# Patient Record
Sex: MALE | Race: WHITE | NOT HISPANIC OR LATINO | Employment: OTHER | URBAN - METROPOLITAN AREA
[De-identification: names, ages, dates, MRNs, and addresses within clinical notes are randomized per-mention and may not be internally consistent; named-entity substitution may affect disease eponyms.]

---

## 2018-12-10 ENCOUNTER — TRANSFERRED RECORDS (OUTPATIENT)
Dept: HEALTH INFORMATION MANAGEMENT | Facility: CLINIC | Age: 73
End: 2018-12-10

## 2018-12-13 ENCOUNTER — TRANSFERRED RECORDS (OUTPATIENT)
Dept: HEALTH INFORMATION MANAGEMENT | Facility: CLINIC | Age: 73
End: 2018-12-13

## 2019-02-05 ENCOUNTER — TRANSFERRED RECORDS (OUTPATIENT)
Dept: HEALTH INFORMATION MANAGEMENT | Facility: CLINIC | Age: 74
End: 2019-02-05

## 2019-02-11 ENCOUNTER — TRANSFERRED RECORDS (OUTPATIENT)
Dept: HEALTH INFORMATION MANAGEMENT | Facility: CLINIC | Age: 74
End: 2019-02-11

## 2019-02-19 ENCOUNTER — MEDICAL CORRESPONDENCE (OUTPATIENT)
Dept: HEALTH INFORMATION MANAGEMENT | Facility: CLINIC | Age: 74
End: 2019-02-19

## 2019-02-21 ENCOUNTER — TELEPHONE (OUTPATIENT)
Dept: NEPHROLOGY | Facility: CLINIC | Age: 74
End: 2019-02-21

## 2019-02-21 DIAGNOSIS — N28.1 KIDNEY CYSTS: Primary | ICD-10-CM

## 2019-02-21 NOTE — TELEPHONE ENCOUNTER
[facility] Call Center    Phone Message    May a detailed message be left on voicemail: yes    Reason for Call: Other: Pt is calling because he will be a new Pt if Dr. Maria , Pt has a CD of a MRI and a ultra sound and would like to know what would be the best way of getting this information to him. The pt. also would like to know do he need a Urinalysis before his appt as well. Please call and discuss with the pt.      Action Taken: Message routed to:  [facility] ([facility] michelle plascencia

## 2019-02-22 NOTE — TELEPHONE ENCOUNTER
[facility] Call Center    Phone Message    May a detailed message be left on voicemail: yes    Reason for Call: Other: Pt called in today stating that he still has not heard back regarding his concerns. Pt would really like to get all of his documents, labs, previous blood work, and everything to the doctor before his procedure. Pt stated that he is willing to drop them off. Pt also stated that if it is at all possible or if there are any cancellations he would love to take an earlier appointment. Please advise when available     Action Taken: Message routed to:  [facility] ([facility]): Neph

## 2019-02-22 NOTE — TELEPHONE ENCOUNTER
Spoke to patient regarding appointment, labs and records. Patient will send records and disks via mail, and may sign ERIBERTO for [facility] as well. Patient wanting to have urine labs done prior at [facility]. Will fax orders to [phone]. Cancelled lab appointment for March 28. Message sent to schedulers regarding sooner appointment if possible for Dr. Maria.  Chitra Hall, AGATA  Nephrology  [phone]

## 2019-02-25 ENCOUNTER — DOCUMENTATION ONLY (OUTPATIENT)
Dept: CARE COORDINATION | Facility: CLINIC | Age: 74
End: 2019-02-25

## 2019-02-25 ENCOUNTER — TRANSFERRED RECORDS (OUTPATIENT)
Dept: HEALTH INFORMATION MANAGEMENT | Facility: CLINIC | Age: 74
End: 2019-02-25

## 2019-02-26 ENCOUNTER — TELEPHONE (OUTPATIENT)
Dept: NEPHROLOGY | Facility: CLINIC | Age: 74
End: 2019-02-26

## 2019-02-26 NOTE — TELEPHONE ENCOUNTER
[facility] Call Center    Phone Message    May a detailed message be left on voicemail: yes    Reason for Call: Requesting Results   Name/type of test: Lab tests  Date of test: 2/25/2019, he states that [facility] faxed the results to the clinic today  Was test done at a location other than [facility] (Please fill in the location if not [facility])?: Yes: [facility]      Action Taken: Message routed to:  [facility] ([facility]):  Nephrology

## 2019-02-28 ENCOUNTER — TELEPHONE (OUTPATIENT)
Dept: NEPHROLOGY | Facility: CLINIC | Age: 74
End: 2019-02-28

## 2019-02-28 NOTE — TELEPHONE ENCOUNTER
Pt called to verify receipt of labs sent and the cd of the ultrasound that he mailed 2 days ago.  Please follow up with pt, via his cell phone number.  Thank you!

## 2019-03-01 NOTE — TELEPHONE ENCOUNTER
Call to inform patient that lab results and CD have been received. CD sent to be uploaded.  Chitra Hall LPN  Nephrology  [phone]

## 2019-03-20 ENCOUNTER — MYC MEDICAL ADVICE (OUTPATIENT)
Dept: NEPHROLOGY | Facility: CLINIC | Age: 74
End: 2019-03-20

## 2019-03-27 ENCOUNTER — DOCUMENTATION ONLY (OUTPATIENT)
Dept: NEPHROLOGY | Facility: CLINIC | Age: 74
End: 2019-03-27

## 2019-03-27 DIAGNOSIS — N28.1 KIDNEY CYSTS: Primary | ICD-10-CM

## 2019-03-27 NOTE — PROGRESS NOTES
Radiology will be able to Push US images from Feb 11 through to [facility] tomorrow once consent is signed. Patient has been informed and will also bring his CD of US images and personal computer tomorrow. Will update Dr. Maria.  Chitra Hall, AGATA  Nephrology  [phone]

## 2019-03-28 ENCOUNTER — OFFICE VISIT (OUTPATIENT)
Dept: NEPHROLOGY | Facility: CLINIC | Age: 74
End: 2019-03-28
Attending: INTERNAL MEDICINE
Payer: MEDICARE

## 2019-03-28 VITALS
WEIGHT: 160.4 LBS | HEART RATE: 60 BPM | HEIGHT: 68 IN | TEMPERATURE: 97 F | OXYGEN SATURATION: 97 % | BODY MASS INDEX: 24.31 KG/M2 | SYSTOLIC BLOOD PRESSURE: 160 MMHG | DIASTOLIC BLOOD PRESSURE: 81 MMHG

## 2019-03-28 DIAGNOSIS — I12.9 RENAL HYPERTENSION: ICD-10-CM

## 2019-03-28 DIAGNOSIS — I67.1 CEREBRAL ANEURYSM: ICD-10-CM

## 2019-03-28 DIAGNOSIS — Q61.2 ADPKD (AUTOSOMAL DOMINANT POLYCYSTIC KIDNEY DISEASE): Primary | ICD-10-CM

## 2019-03-28 DIAGNOSIS — I67.1 CEREBRAL ANEURYSM, NONRUPTURED: ICD-10-CM

## 2019-03-28 DIAGNOSIS — N28.1 KIDNEY CYSTS: ICD-10-CM

## 2019-03-28 DIAGNOSIS — Z86.79 HISTORY OF INTRACRANIAL HEMORRHAGE: ICD-10-CM

## 2019-03-28 LAB
ALBUMIN SERPL-MCNC: 3.9 G/DL (ref 3.4–5)
ALBUMIN UR-MCNC: NEGATIVE MG/DL
ALP SERPL-CCNC: 65 U/L (ref 40–150)
ALT SERPL W P-5'-P-CCNC: 24 U/L (ref 0–70)
ANION GAP SERPL CALCULATED.3IONS-SCNC: 5 MMOL/L (ref 3–14)
APPEARANCE UR: ABNORMAL
AST SERPL W P-5'-P-CCNC: 18 U/L (ref 0–45)
BILIRUB SERPL-MCNC: 0.3 MG/DL (ref 0.2–1.3)
BILIRUB UR QL STRIP: NEGATIVE
BUN SERPL-MCNC: 31 MG/DL (ref 7–30)
CALCIUM SERPL-MCNC: 8.7 MG/DL (ref 8.5–10.1)
CHLORIDE SERPL-SCNC: 109 MMOL/L (ref 94–109)
CO2 SERPL-SCNC: 27 MMOL/L (ref 20–32)
COLOR UR AUTO: YELLOW
CREAT SERPL-MCNC: 1.17 MG/DL (ref 0.66–1.25)
CREAT UR-MCNC: 196 MG/DL
ERYTHROCYTE [DISTWIDTH] IN BLOOD BY AUTOMATED COUNT: 12.5 % (ref 10–15)
GFR SERPL CREATININE-BSD FRML MDRD: 62 ML/MIN/{1.73_M2}
GLUCOSE SERPL-MCNC: 106 MG/DL (ref 70–99)
GLUCOSE UR STRIP-MCNC: NEGATIVE MG/DL
HCT VFR BLD AUTO: 41.5 % (ref 40–53)
HGB BLD-MCNC: 13.7 G/DL (ref 13.3–17.7)
HGB UR QL STRIP: NEGATIVE
KETONES UR STRIP-MCNC: 5 MG/DL
LEUKOCYTE ESTERASE UR QL STRIP: NEGATIVE
MCH RBC QN AUTO: 31.5 PG (ref 26.5–33)
MCHC RBC AUTO-ENTMCNC: 33 G/DL (ref 31.5–36.5)
MCV RBC AUTO: 95 FL (ref 78–100)
MUCOUS THREADS #/AREA URNS LPF: PRESENT /LPF
NITRATE UR QL: NEGATIVE
PH UR STRIP: 5 PH (ref 5–7)
PLATELET # BLD AUTO: 218 10E9/L (ref 150–450)
POTASSIUM SERPL-SCNC: 4 MMOL/L (ref 3.4–5.3)
PROT SERPL-MCNC: 6.7 G/DL (ref 6.8–8.8)
PROT UR-MCNC: 0.2 G/L
PROT/CREAT 24H UR: 0.1 G/G CR (ref 0–0.2)
RBC # BLD AUTO: 4.35 10E12/L (ref 4.4–5.9)
RBC #/AREA URNS AUTO: 3 /HPF (ref 0–2)
SODIUM SERPL-SCNC: 142 MMOL/L (ref 133–144)
SOURCE: ABNORMAL
SP GR UR STRIP: 1.02 (ref 1–1.03)
UROBILINOGEN UR STRIP-MCNC: 2 MG/DL (ref 0–2)
WBC # BLD AUTO: 6.4 10E9/L (ref 4–11)
WBC #/AREA URNS AUTO: 1 /HPF (ref 0–5)

## 2019-03-28 PROCEDURE — 84156 ASSAY OF PROTEIN URINE: CPT | Performed by: INTERNAL MEDICINE

## 2019-03-28 PROCEDURE — 81001 URINALYSIS AUTO W/SCOPE: CPT | Performed by: INTERNAL MEDICINE

## 2019-03-28 PROCEDURE — 36415 COLL VENOUS BLD VENIPUNCTURE: CPT | Performed by: INTERNAL MEDICINE

## 2019-03-28 PROCEDURE — 85027 COMPLETE CBC AUTOMATED: CPT | Performed by: INTERNAL MEDICINE

## 2019-03-28 PROCEDURE — 80053 COMPREHEN METABOLIC PANEL: CPT | Performed by: INTERNAL MEDICINE

## 2019-03-28 PROCEDURE — G0463 HOSPITAL OUTPT CLINIC VISIT: HCPCS | Mod: ZF

## 2019-03-28 RX ORDER — AMLODIPINE BESYLATE 10 MG/1
10 TABLET ORAL DAILY
Qty: 90 TABLET | Refills: 3 | Status: SHIPPED | OUTPATIENT
Start: 2019-03-28 | End: 2019-06-21

## 2019-03-28 RX ORDER — AMLODIPINE BESYLATE 10 MG/1
10 TABLET ORAL DAILY
COMMUNITY
End: 2019-06-13

## 2019-03-28 RX ORDER — LISINOPRIL 10 MG/1
10 TABLET ORAL DAILY
Qty: 90 TABLET | Refills: 3 | Status: SHIPPED | OUTPATIENT
Start: 2019-03-28 | End: 2019-06-21

## 2019-03-28 SDOH — HEALTH STABILITY: MENTAL HEALTH: HOW OFTEN DO YOU HAVE A DRINK CONTAINING ALCOHOL?: NEVER

## 2019-03-28 ASSESSMENT — MIFFLIN-ST. JEOR: SCORE: 1457.07

## 2019-03-28 ASSESSMENT — PAIN SCALES - GENERAL: PAINLEVEL: NO PAIN (0)

## 2019-03-28 NOTE — LETTER
3/28/2019       RE: Tariq Yeager  [address]  [place] [address]     Dear Colleague,    Thank you for referring your patient, Tariq Yeager, to the [facility] NEPHROLOGY at [facility]. Please see a copy of my visit note below.    Nephrology Clinic    Tariq Yeager MRN:[id number] YOB: 1945  Date of Service: 03/30/2019  Primary care provider: No Ref-Primary, Physician  Requesting physician: Kristina Alvarado      REASON FOR CONSULT: Polycystic kidney disease    HISTORY OF PRESENT ILLNESS:   Tariq Yeager is a 73 year old male who presents for evaluation of multiple kidney cysts.    Mr Yeager has recently been informed about the presence of bilateral renal cysts which were incidentally identified by MRI of the spine he received in early February 2018 for back pain.  The presence of bilateral renal cysts was confirmed by ultrasound also obtained recently. (2/11/19 [facility])(see below).  He denies any history of gross hematuria, flank pain, urolithiasis, or UTIs.  He does however have a history of acute sudden incerebral hemorrhage in 2002 for which he had a prolonged ICU hospitalization at [facility] .  Note is made that he also underwent placement of a permanent IVC filter, although Mr Yeager does not recall having had a DVT.  Prior to that event, it does not appear that he was in good health.  He had an inguinal herniorrhaphy on the R and reports a recurrence of the hernia + new Inguinal hernia on the L.  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     Hypertension 2002     Inguinal hernia 2016     Presence of IVC filter 2002     PAST SURGICAL HISTORY:  Past Surgical History:   Procedure Laterality Date     cerebral  "aneurysm coil  2002     CRANIOTOMY, REPAIR ANEURYSM, COMBINED  2002     HERNIORRHAPHY INGUINAL Right 2016     MEDICATIONS:  Prescription Medications as of 3/30/2019       Rx Number Disp Refills Start End Last Dispensed Date Next Fill Date Owning Pharmacy    amLODIPine (NORVASC) 10 MG tablet            Sig: Take 10 mg by mouth daily    Class: Historical    Route: Oral    amLODIPine (NORVASC) 10 MG tablet  90 tablet 3 3/28/2019    [facility] [address] [place] [address], [place] - [address] AT [facility]    Sig: Take 1 tablet (10 mg) by mouth daily    Class: E-Prescribe    Route: Oral    lisinopril (PRINIVIL/ZESTRIL) 10 MG tablet  90 tablet 3 3/28/2019    [facility] [address] - [place], [place] - [address] AT [facility]    Sig: Take 1 tablet (10 mg) by mouth daily    Class: E-Prescribe    Route: Oral         ALLERGIES:    Allergies   Allergen Reactions     Dilantin [Phenytoin] Rash     REVIEW OF SYSTEMS:  A comprehensive review of systems was performed and found to be negative except as described here or above. + history of recurrent epistaxis (last episode 2 months ago)      SOCIAL HISTORY:   Social History     Socioeconomic History     Marital status:      Spouse name: Not on file     Number of children: Not on file     Years of education: Not on file     Highest education level: Not on file   Occupational History     Not on file   Social Needs     Financial resource strain: Not on file     Food insecurity:     Worry: Not on file     Inability: Not on file     Transportation needs:     Medical: Not on file     Non-medical: Not on file   Tobacco Use     Smoking status: Never Smoker     Smokeless tobacco: Never Used   Substance and Sexual Activity     Alcohol use: No     Frequency: Never.  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  Remote history of use > 40 yrs ago     Sexual activity: Not on file   Lifestyle     Physical activity:     Days " "per week: Swims 3x/week     Minutes per session: 25 min     Stress: Not on file   Relationships   Other Topics Concern     Not on file   Social History Narrative     Not on file     FAMILY MEDICAL HISTORY:   No family history on file.  PHYSICAL EXAM:   /81   Pulse 60   Temp 97  F (36.1  C) (Oral)   Ht 1.727 m (5' 8\")   Wt 72.8 kg (160 lb 6.4 oz)   SpO2 97%   BMI 24.39 kg/m     GENERAL APPEARANCE: alert and no distress  EYES: nonicteric  HENT: mouth without ulcers or lesions  NECK: supple, no adenopathy  RESP: lungs clear to auscultation   CV: regular rhythm, normal rate, no rub  ABDOMEN: soft, nontender, normal bowel sounds, no HSM   Extremities: no edema  MS: no evidence of inflammation in joints, no muscle tenderness  SKIN: no rash  NEURO: mentation intact and speech normal  PSYCH: affect normal/bright   LABS:   Recent Results (from the past 1008 hour(s))   Comprehensive metabolic panel    Collection Time: 03/28/19  2:56 PM   Result Value Ref Range    Sodium 142 133 - 144 mmol/L    Potassium 4.0 3.4 - 5.3 mmol/L    Chloride 109 94 - 109 mmol/L    Carbon Dioxide 27 20 - 32 mmol/L    Anion Gap 5 3 - 14 mmol/L    Glucose 106 (H) 70 - 99 mg/dL    Urea Nitrogen 31 (H) 7 - 30 mg/dL    Creatinine 1.17 0.66 - 1.25 mg/dL    GFR Estimate 62 >60 mL/min/[1.73_m2]    GFR Estimate If Black 72 >60 mL/min/[1.73_m2]    Calcium 8.7 8.5 - 10.1 mg/dL    Bilirubin Total 0.3 0.2 - 1.3 mg/dL    Albumin 3.9 3.4 - 5.0 g/dL    Protein Total 6.7 (L) 6.8 - 8.8 g/dL    Alkaline Phosphatase 65 40 - 150 U/L    ALT 24 0 - 70 U/L    AST 18 0 - 45 U/L   CBC with platelets    Collection Time: 03/28/19  2:56 PM   Result Value Ref Range    WBC 6.4 4.0 - 11.0 10e9/L    RBC Count 4.35 (L) 4.4 - 5.9 10e12/L    Hemoglobin 13.7 13.3 - 17.7 g/dL    Hematocrit 41.5 40.0 - 53.0 %    MCV 95 78 - 100 fl    MCH 31.5 26.5 - 33.0 pg    MCHC 33.0 31.5 - 36.5 g/dL    RDW 12.5 10.0 - 15.0 %    Platelet Count 218 150 - 450 10e9/L   Routine UA with micro " reflex to culture    Collection Time: 03/28/19  3:01 PM   Result Value Ref Range    Color Urine Yellow     Appearance Urine Slightly Cloudy     Glucose Urine Negative NEG^Negative mg/dL    Bilirubin Urine Negative NEG^Negative    Ketones Urine 5 (A) NEG^Negative mg/dL    Specific Gravity Urine 1.019 1.003 - 1.035    Blood Urine Negative NEG^Negative    pH Urine 5.0 5.0 - 7.0 pH    Protein Albumin Urine Negative NEG^Negative mg/dL    Urobilinogen mg/dL 2.0 0.0 - 2.0 mg/dL    Nitrite Urine Negative NEG^Negative    Leukocyte Esterase Urine Negative NEG^Negative    Source Midstream Urine     WBC Urine 1 0 - 5 /HPF    RBC Urine 3 (H) 0 - 2 /HPF    Mucous Urine Present (A) NEG^Negative /LPF   Protein  random urine with Creat Ratio    Collection Time: 03/28/19  3:01 PM   Result Value Ref Range    Protein Random Urine 0.20 g/L    Protein Total Urine g/gr Creatinine 0.10 0 - 0.2 g/g Cr   Creatinine urine calculation only    Collection Time: 03/28/19  3:01 PM   Result Value Ref Range    Creatinine Urine 196 mg/dL     CMP  Recent Labs   Lab Test 03/28/19  1456      POTASSIUM 4.0   CHLORIDE 109   CO2 27   ANIONGAP 5   *   BUN 31*   CR 1.17   GFRESTIMATED 62   GFRESTBLACK 72   JOHN 8.7   PROTTOTAL 6.7*   ALBUMIN 3.9   BILITOTAL 0.3   ALKPHOS 65   AST 18   ALT 24     CBC  Recent Labs   Lab Test 03/28/19  1456   HGB 13.7   WBC 6.4   RBC 4.35*   HCT 41.5   MCV 95   MCH 31.5   MCHC 33.0   RDW 12.5        INRNo lab results found.  ABGNo lab results found.   URINE STUDIES  Recent Labs   Lab Test 03/28/19  1501   COLOR Yellow   APPEARANCE Slightly Cloudy   URINEGLC Negative   URINEBILI Negative   URINEKETONE 5*   SG 1.019   UBLD Negative   URINEPH 5.0   PROTEIN Negative   NITRITE Negative   LEUKEST Negative   RBCU 3*   WBCU 1     Recent Labs   Lab Test 03/28/19  1501   UTPG 0.10               ASSESSMENT AND PLAN:   Mr Yeager presents with concerns about recently finding that he has multiple cysts on both kidneys  "found incidentally when he had an MRI of the spine.  This was confirmed by US.    He has a suggestive family history with one of his sisters who has \"cysts\".  His other sister and brother have not been tested.    In addition, his personal history is certainly consistent with and suggestive with ADPKD with history of ruptured cerebral aneurysm and bilateral hernias.  At this point; he has well preserved renal function at the age of 73.  I do not have accurate measures of the kidney sizes (see report).  However, even if I estimated the R kidney to be 16 cm in length, we would still be classified as 1B by the [place] ADPKD calculator, and his 10 year eGFR course would still place him at ~ 30 ml/min/1.73 m2 at age 83.   I will obtain an MRI without contrast of the kidneys to better document his renal sizes and better evaluate for complex cysts.  More importantly, I am concerned about his history of ruptured cysts.  He has not had a follow up MRI in 16 years.  I will therefore obtain an MRA with contrast (given his preserved renal function).  I am also concerned about his uncontrolled HTN.  He used to be on lisinopril but then switched to amlodipine alone.  I have refilled the amlodipine and added lisinopril 10 mg daily.  I plan to obtain follow up labs in 10 days, and follow up visit in 3 months.    Feroz Maria MD  Division of Renal Disease and Hypertension  March 28, 2019  4:39 PM      Again, thank you for allowing me to participate in the care of your patient.      Sincerely,    Feroz Maria MD      "

## 2019-03-28 NOTE — PROGRESS NOTES
Nephrology Clinic    Tariq Yeager MRN:[id number] YOB: 1945  Date of Service: 03/30/2019  Primary care provider: Jo Ref-Primary, Physician  Requesting physician: Kristina Alvarado      REASON FOR CONSULT: Polycystic kidney disease    HISTORY OF PRESENT ILLNESS:   Tariq Yeager is a 73 year old male who presents for evaluation of multiple kidney cysts.    Mr Yeager has recently been informed about the presence of bilateral renal cysts which were incidentally identified by MRI of the spine he received in early February 2018 for back pain.  The presence of bilateral renal cysts was confirmed by ultrasound also obtained recently. (2/11/19 [facility]iology)(see below).  He denies any history of gross hematuria, flank pain, urolithiasis, or UTIs.  He does however have a history of acute sudden incerebral hemorrhage in 2002 for which he had a prolonged ICU hospitalization at [facility] .  Note is made that he also underwent placement of a permanent IVC filter, although Mr Yeager does not recall having had a DVT.  Prior to that event, it does not appear that he was in good health.  He had an inguinal herniorrhaphy on the R and reports a recurrence of the hernia + new Inguinal hernia on the L.  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     Hypertension 2002     Inguinal hernia 2016     Presence of IVC filter 2002     PAST SURGICAL HISTORY:  Past Surgical History:   Procedure Laterality Date     cerebral aneurysm coil  2002     CRANIOTOMY, REPAIR ANEURYSM, COMBINED  2002     HERNIORRHAPHY INGUINAL Right 2016     MEDICATIONS:  Prescription Medications as of 3/30/2019       Rx Number Disp Refills Start End Last Dispensed Date Next Fill Date Owning Pharmacy    amLODIPine (NORVASC)  "10 MG tablet            Sig: Take 10 mg by mouth daily    Class: Historical    Route: Oral    amLODIPine (NORVASC) 10 MG tablet  90 tablet 3 3/28/2019    [facility] [address], [place] - [address] AT [facility]    Sig: Take 1 tablet (10 mg) by mouth daily    Class: E-Prescribe    Route: Oral    lisinopril (PRINIVIL/ZESTRIL) 10 MG tablet  90 tablet 3 3/28/2019    [facility] [address], [place] - [address] AT [facility]    Sig: Take 1 tablet (10 mg) by mouth daily    Class: E-Prescribe    Route: Oral         ALLERGIES:    Allergies   Allergen Reactions     Dilantin [Phenytoin] Rash     REVIEW OF SYSTEMS:  A comprehensive review of systems was performed and found to be negative except as described here or above. + history of recurrent epistaxis (last episode 2 months ago)      SOCIAL HISTORY:   Social History     Socioeconomic History     Marital status:      Spouse name: Not on file     Number of children: Not on file     Years of education: Not on file     Highest education level: Not on file   Occupational History     Not on file   Social Needs     Financial resource strain: Not on file     Food insecurity:     Worry: Not on file     Inability: Not on file     Transportation needs:     Medical: Not on file     Non-medical: Not on file   Tobacco Use     Smoking status: Never Smoker     Smokeless tobacco: Never Used   Substance and Sexual Activity     Alcohol use: No     Frequency: Never.  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  Remote history of use > 40 yrs ago     Sexual activity: Not on file   Lifestyle     Physical activity:     Days per week: Swims 3x/week     Minutes per session: 25 min     Stress: Not on file   Relationships   Other Topics Concern     Not on file   Social History Narrative     Not on file     FAMILY MEDICAL HISTORY:   No family history on file.  PHYSICAL EXAM:   /81   Pulse 60   " "Temp 97  F (36.1  C) (Oral)   Ht 1.727 m (5' 8\")   Wt 72.8 kg (160 lb 6.4 oz)   SpO2 97%   BMI 24.39 kg/m    GENERAL APPEARANCE: alert and no distress  EYES: nonicteric  HENT: mouth without ulcers or lesions  NECK: supple, no adenopathy  RESP: lungs clear to auscultation   CV: regular rhythm, normal rate, no rub  ABDOMEN: soft, nontender, normal bowel sounds, no HSM   Extremities: no edema  MS: no evidence of inflammation in joints, no muscle tenderness  SKIN: no rash  NEURO: mentation intact and speech normal  PSYCH: affect normal/bright   LABS:   Recent Results (from the past 1008 hour(s))   Comprehensive metabolic panel    Collection Time: 03/28/19  2:56 PM   Result Value Ref Range    Sodium 142 133 - 144 mmol/L    Potassium 4.0 3.4 - 5.3 mmol/L    Chloride 109 94 - 109 mmol/L    Carbon Dioxide 27 20 - 32 mmol/L    Anion Gap 5 3 - 14 mmol/L    Glucose 106 (H) 70 - 99 mg/dL    Urea Nitrogen 31 (H) 7 - 30 mg/dL    Creatinine 1.17 0.66 - 1.25 mg/dL    GFR Estimate 62 >60 mL/min/[1.73_m2]    GFR Estimate If Black 72 >60 mL/min/[1.73_m2]    Calcium 8.7 8.5 - 10.1 mg/dL    Bilirubin Total 0.3 0.2 - 1.3 mg/dL    Albumin 3.9 3.4 - 5.0 g/dL    Protein Total 6.7 (L) 6.8 - 8.8 g/dL    Alkaline Phosphatase 65 40 - 150 U/L    ALT 24 0 - 70 U/L    AST 18 0 - 45 U/L   CBC with platelets    Collection Time: 03/28/19  2:56 PM   Result Value Ref Range    WBC 6.4 4.0 - 11.0 10e9/L    RBC Count 4.35 (L) 4.4 - 5.9 10e12/L    Hemoglobin 13.7 13.3 - 17.7 g/dL    Hematocrit 41.5 40.0 - 53.0 %    MCV 95 78 - 100 fl    MCH 31.5 26.5 - 33.0 pg    MCHC 33.0 31.5 - 36.5 g/dL    RDW 12.5 10.0 - 15.0 %    Platelet Count 218 150 - 450 10e9/L   Routine UA with micro reflex to culture    Collection Time: 03/28/19  3:01 PM   Result Value Ref Range    Color Urine Yellow     Appearance Urine Slightly Cloudy     Glucose Urine Negative NEG^Negative mg/dL    Bilirubin Urine Negative NEG^Negative    Ketones Urine 5 (A) NEG^Negative mg/dL    Specific " "[place] Urine 1.019 1.003 - 1.035    Blood Urine Negative NEG^Negative    pH Urine 5.0 5.0 - 7.0 pH    Protein Albumin Urine Negative NEG^Negative mg/dL    Urobilinogen mg/dL 2.0 0.0 - 2.0 mg/dL    Nitrite Urine Negative NEG^Negative    Leukocyte Esterase Urine Negative NEG^Negative    Source Midstream Urine     WBC Urine 1 0 - 5 /HPF    RBC Urine 3 (H) 0 - 2 /HPF    Mucous Urine Present (A) NEG^Negative /LPF   Protein  random urine with Creat Ratio    Collection Time: 03/28/19  3:01 PM   Result Value Ref Range    Protein Random Urine 0.20 g/L    Protein Total Urine g/gr Creatinine 0.10 0 - 0.2 g/g Cr   Creatinine urine calculation only    Collection Time: 03/28/19  3:01 PM   Result Value Ref Range    Creatinine Urine 196 mg/dL     CMP  Recent Labs   Lab Test 03/28/19  1456      POTASSIUM 4.0   CHLORIDE 109   CO2 27   ANIONGAP 5   *   BUN 31*   CR 1.17   GFRESTIMATED 62   GFRESTBLACK 72   JOHN 8.7   PROTTOTAL 6.7*   ALBUMIN 3.9   BILITOTAL 0.3   ALKPHOS 65   AST 18   ALT 24     CBC  Recent Labs   Lab Test 03/28/19  1456   HGB 13.7   WBC 6.4   RBC 4.35*   HCT 41.5   MCV 95   MCH 31.5   MCHC 33.0   RDW 12.5        INRNo lab results found.  ABGNo lab results found.   URINE STUDIES  Recent Labs   Lab Test 03/28/19  1501   COLOR Yellow   APPEARANCE Slightly Cloudy   URINEGLC Negative   URINEBILI Negative   URINEKETONE 5*   SG 1.019   UBLD Negative   URINEPH 5.0   PROTEIN Negative   NITRITE Negative   LEUKEST Negative   RBCU 3*   WBCU 1     Recent Labs   Lab Test 03/28/19  1501   UTPG 0.10               ASSESSMENT AND PLAN:   Mr Yeager presents with concerns about recently finding that he has multiple cysts on both kidneys found incidentally when he had an MRI of the spine.  This was confirmed by US.    He has a suggestive family history with one of his sisters who has \"cysts\".  His other sister and brother have not been tested.    In addition, his personal history is certainly consistent with and " suggestive with ADPKD with history of ruptured cerebral aneurysm and bilateral hernias.  At this point; he has well preserved renal function at the age of 73.  I do not have accurate measures of the kidney sizes (see report).  However, even if I estimated the R kidney to be 16 cm in length, we would still be classified as 1B by the [place] ADPKD calculator, and his 10 year eGFR course would still place him at ~ 30 ml/min/1.73 m2 at age 83.   I will obtain an MRI without contrast of the kidneys to better document his renal sizes and better evaluate for complex cysts.  More importantly, I am concerned about his history of ruptured cysts.  He has not had a follow up MRI in 16 years.  I will therefore obtain an MRA with contrast (given his preserved renal function).  I am also concerned about his uncontrolled HTN.  He used to be on lisinopril but then switched to amlodipine alone.  I have refilled the amlodipine and added lisinopril 10 mg daily.  I plan to obtain follow up labs in 10 days, and follow up visit in 3 months.    Feroz Maria MD  Division of Renal Disease and Hypertension  March 28, 2019  4:39 PM

## 2019-03-28 NOTE — LETTER
3/28/2019      RE: Tariq Yeager  [address]  [facility] [facility] [address]       Nephrology Clinic    Tariq Yeager MRN:[id number] YOB: 1945  Date of Service: 03/30/2019  Primary care provider: Jo Ref-Primary, Physician  Requesting physician: Kristina Alvarado      REASON FOR CONSULT: Polycystic kidney disease    HISTORY OF PRESENT ILLNESS:   Tariq Yeager is a 73 year old male who presents for evaluation of multiple kidney cysts.    Mr Yeager has recently been informed about the presence of bilateral renal cysts which were incidentally identified by MRI of the spine he received in early February 2018 for back pain.  The presence of bilateral renal cysts was confirmed by ultrasound also obtained recently. (2/11/19 [facility] Radiology)(see below).  He denies any history of gross hematuria, flank pain, urolithiasis, or UTIs.  He does however have a history of acute sudden incerebral hemorrhage in 2002 for which he had a prolonged ICU hospitalization at [facility] .  Note is made that he also underwent placement of a permanent IVC filter, although Mr Yeager does not recall having had a DVT.  Prior to that event, it does not appear that he was in good health.  He had an inguinal herniorrhaphy on the R and reports a recurrence of the hernia + new Inguinal hernia on the L.  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     Hypertension 2002     Inguinal hernia 2016     Presence of IVC filter 2002     PAST SURGICAL HISTORY:  Past Surgical History:   Procedure Laterality Date     cerebral aneurysm coil  2002     CRANIOTOMY, REPAIR ANEURYSM, COMBINED  2002     HERNIORRHAPHY INGUINAL Right 2016     MEDICATIONS:  Prescription Medications as of 3/30/2019       Rx Number Disp Refills Start  "End Last Dispensed Date Next Fill Date Owning Pharmacy    amLODIPine (NORVASC) 10 MG tablet            Sig: Take 10 mg by mouth daily    Class: Historical    Route: Oral    amLODIPine (NORVASC) 10 MG tablet  90 tablet 3 3/28/2019    [id number] - [place], [place] - [address] AT [facility] & [facility]    Sig: Take 1 tablet (10 mg) by mouth daily    Class: E-Prescribe    Route: Oral    lisinopril (PRINIVIL/ZESTRIL) 10 MG tablet  90 tablet 3 3/28/2019    [id number] - [place], [place] - [address] AT [facility] [address] & [facility]    Sig: Take 1 tablet (10 mg) by mouth daily    Class: E-Prescribe    Route: Oral         ALLERGIES:    Allergies   Allergen Reactions     Dilantin [Phenytoin] Rash     REVIEW OF SYSTEMS:  A comprehensive review of systems was performed and found to be negative except as described here or above. + history of recurrent epistaxis (last episode 2 months ago)      SOCIAL HISTORY:   Social History     Socioeconomic History     Marital status:      Spouse name: Not on file     Number of children: Not on file     Years of education: Not on file     Highest education level: Not on file   Occupational History     Not on file   Social Needs     Financial resource strain: Not on file     Food insecurity:     Worry: Not on file     Inability: Not on file     Transportation needs:     Medical: Not on file     Non-medical: Not on file   Tobacco Use     Smoking status: Never Smoker     Smokeless tobacco: Never Used   Substance and Sexual Activity     Alcohol use: No     Frequency: Never.  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  Remote history of use > 40 yrs ago     Sexual activity: Not on file   Lifestyle     Physical activity:     Days per week: Swims 3x/week     Minutes per session: 25 min     Stress: Not on file   Relationships   Other Topics Concern     Not on file   Social History Narrative     Not on file     FAMILY MEDICAL " "HISTORY:   No family history on file.  PHYSICAL EXAM:   /81   Pulse 60   Temp 97  F (36.1  C) (Oral)   Ht 1.727 m (5' 8\")   Wt 72.8 kg (160 lb 6.4 oz)   SpO2 97%   BMI 24.39 kg/m     GENERAL APPEARANCE: alert and no distress  EYES: nonicteric  HENT: mouth without ulcers or lesions  NECK: supple, no adenopathy  RESP: lungs clear to auscultation   CV: regular rhythm, normal rate, no rub  ABDOMEN: soft, nontender, normal bowel sounds, no HSM   Extremities: no edema  MS: no evidence of inflammation in joints, no muscle tenderness  SKIN: no rash  NEURO: mentation intact and speech normal  PSYCH: affect normal/bright   LABS:   Recent Results (from the past 1008 hour(s))   Comprehensive metabolic panel    Collection Time: 03/28/19  2:56 PM   Result Value Ref Range    Sodium 142 133 - 144 mmol/L    Potassium 4.0 3.4 - 5.3 mmol/L    Chloride 109 94 - 109 mmol/L    Carbon Dioxide 27 20 - 32 mmol/L    Anion Gap 5 3 - 14 mmol/L    Glucose 106 (H) 70 - 99 mg/dL    Urea Nitrogen 31 (H) 7 - 30 mg/dL    Creatinine 1.17 0.66 - 1.25 mg/dL    GFR Estimate 62 >60 mL/min/[1.73_m2]    GFR Estimate If Black 72 >60 mL/min/[1.73_m2]    Calcium 8.7 8.5 - 10.1 mg/dL    Bilirubin Total 0.3 0.2 - 1.3 mg/dL    Albumin 3.9 3.4 - 5.0 g/dL    Protein Total 6.7 (L) 6.8 - 8.8 g/dL    Alkaline Phosphatase 65 40 - 150 U/L    ALT 24 0 - 70 U/L    AST 18 0 - 45 U/L   CBC with platelets    Collection Time: 03/28/19  2:56 PM   Result Value Ref Range    WBC 6.4 4.0 - 11.0 10e9/L    RBC Count 4.35 (L) 4.4 - 5.9 10e12/L    Hemoglobin 13.7 13.3 - 17.7 g/dL    Hematocrit 41.5 40.0 - 53.0 %    MCV 95 78 - 100 fl    MCH 31.5 26.5 - 33.0 pg    MCHC 33.0 31.5 - 36.5 g/dL    RDW 12.5 10.0 - 15.0 %    Platelet Count 218 150 - 450 10e9/L   Routine UA with micro reflex to culture    Collection Time: 03/28/19  3:01 PM   Result Value Ref Range    Color Urine Yellow     Appearance Urine Slightly Cloudy     Glucose Urine Negative NEG^Negative mg/dL    Bilirubin " "Urine Negative NEG^Negative    Ketones Urine 5 (A) NEG^Negative mg/dL    Specific Gravity Urine 1.019 1.003 - 1.035    Blood Urine Negative NEG^Negative    pH Urine 5.0 5.0 - 7.0 pH    Protein Albumin Urine Negative NEG^Negative mg/dL    Urobilinogen mg/dL 2.0 0.0 - 2.0 mg/dL    Nitrite Urine Negative NEG^Negative    Leukocyte Esterase Urine Negative NEG^Negative    Source Midstream Urine     WBC Urine 1 0 - 5 /HPF    RBC Urine 3 (H) 0 - 2 /HPF    Mucous Urine Present (A) NEG^Negative /LPF   Protein  random urine with Creat Ratio    Collection Time: 03/28/19  3:01 PM   Result Value Ref Range    Protein Random Urine 0.20 g/L    Protein Total Urine g/gr Creatinine 0.10 0 - 0.2 g/g Cr   Creatinine urine calculation only    Collection Time: 03/28/19  3:01 PM   Result Value Ref Range    Creatinine Urine 196 mg/dL     CMP  Recent Labs   Lab Test 03/28/19  1456      POTASSIUM 4.0   CHLORIDE 109   CO2 27   ANIONGAP 5   *   BUN 31*   CR 1.17   GFRESTIMATED 62   GFRESTBLACK 72   JOHN 8.7   PROTTOTAL 6.7*   ALBUMIN 3.9   BILITOTAL 0.3   ALKPHOS 65   AST 18   ALT 24     CBC  Recent Labs   Lab Test 03/28/19  1456   HGB 13.7   WBC 6.4   RBC 4.35*   HCT 41.5   MCV 95   MCH 31.5   MCHC 33.0   RDW 12.5        INRNo lab results found.  ABGNo lab results found.   URINE STUDIES  Recent Labs   Lab Test 03/28/19  1501   COLOR Yellow   APPEARANCE Slightly Cloudy   URINEGLC Negative   URINEBILI Negative   URINEKETONE 5*   SG 1.019   UBLD Negative   URINEPH 5.0   PROTEIN Negative   NITRITE Negative   LEUKEST Negative   RBCU 3*   WBCU 1     Recent Labs   Lab Test 03/28/19  1501   UTPG 0.10               ASSESSMENT AND PLAN:   Mr Yeager presents with concerns about recently finding that he has multiple cysts on both kidneys found incidentally when he had an MRI of the spine.  This was confirmed by US.    He has a suggestive family history with one of his sisters who has \"cysts\".  His other sister and brother have not been " tested.    In addition, his personal history is certainly consistent with and suggestive with ADPKD with history of ruptured cerebral aneurysm and bilateral hernias.  At this point; he has well preserved renal function at the age of 73.  I do not have accurate measures of the kidney sizes (see report).  However, even if I estimated the R kidney to be 16 cm in length, we would still be classified as 1B by the Violet ADPKD calculator, and his 10 year eGFR course would still place him at ~ 30 ml/min/1.73 m2 at age 83.   I will obtain an MRI without contrast of the kidneys to better document his renal sizes and better evaluate for complex cysts.  More importantly, I am concerned about his history of ruptured cysts.  He has not had a follow up MRI in 16 years.  I will therefore obtain an MRA with contrast (given his preserved renal function).  I am also concerned about his uncontrolled HTN.  He used to be on lisinopril but then switched to amlodipine alone.  I have refilled the amlodipine and added lisinopril 10 mg daily.  I plan to obtain follow up labs in 10 days, and follow up visit in 3 months.    Feroz Maria MD  Division of Renal Disease and Hypertension  March 28, 2019  4:39 PM

## 2019-03-30 PROBLEM — I12.9 RENAL HYPERTENSION: Status: ACTIVE | Noted: 2019-03-30

## 2019-03-30 PROBLEM — Q61.2 ADPKD (AUTOSOMAL DOMINANT POLYCYSTIC KIDNEY DISEASE): Status: ACTIVE | Noted: 2019-02-11

## 2019-04-02 NOTE — TELEPHONE ENCOUNTER
[facility] Call Center    Phone Message    May a detailed message be left on voicemail: yes    Reason for Call: Other: PT:  Calling regarding his scans from [facility], pt is wondering if it's a formatting issue with our imaging system. Per pt requesting call back from Ellen PARMAR        Action Taken: Message routed to:  [facility] ([facility] NEPH

## 2019-04-04 ENCOUNTER — TELEPHONE (OUTPATIENT)
Dept: NEPHROLOGY | Facility: CLINIC | Age: 74
End: 2019-04-04

## 2019-04-04 NOTE — TELEPHONE ENCOUNTER
Call back to patient. Left VM. Provided number for call back. Per Dr. Maria, recommendation to repeat labs in 10 days after starting lisinopril. Also mentioned to patient that writer called [facility]iology to push images to Pacs,  from Feb 2019.  Chitra Hall LPN  Nephrology  [phone]

## 2019-04-04 NOTE — TELEPHONE ENCOUNTER
Patient called back and prefers renal panel to be faxed to [facility] Fax: [phone].  Patient reports that sister was just diagnosed with PKD and brother with CKD 3.  Chitra Hall LPN  Nephrology  [phone]

## 2019-04-05 ENCOUNTER — TELEPHONE (OUTPATIENT)
Dept: NEPHROLOGY | Facility: CLINIC | Age: 74
End: 2019-04-05

## 2019-04-05 LAB
ALBUMIN SERPL-MCNC: 4.4 G/DL
ANION GAP SERPL CALCULATED.3IONS-SCNC: ABNORMAL MMOL/L
BUN SERPL-MCNC: 27 MG/DL
CALCIUM SERPL-MCNC: 8.8 MG/DL
CHLORIDE SERPLBLD-SCNC: 106 MMOL/L
CO2 SERPL-SCNC: 28 MMOL/L
CREAT SERPL-MCNC: 1.29 MG/DL
GFR SERPL CREATININE-BSD FRML MDRD: 55 ML/MIN/1.73M2
GLUCOSE SERPL-MCNC: 105 MG/DL (ref 70–99)
POTASSIUM SERPL-SCNC: 4 MMOL/L
SODIUM SERPL-SCNC: 143 MMOL/L

## 2019-04-05 NOTE — TELEPHONE ENCOUNTER
[facility] Call Center    Phone Message    May a detailed message be left on voicemail: yes    Reason for Call: Other: Requesting labs orders be faxed asap, as patient is coming at noon today.      Action Taken: Message routed to:  [facility] Neph

## 2019-04-05 NOTE — TELEPHONE ENCOUNTER
Created a ticket with IT as unable to access images through PACs ticket number#[id number].  Chitra Hall LPN  Nephrology  [phone]

## 2019-04-10 ENCOUNTER — ANCILLARY PROCEDURE (OUTPATIENT)
Dept: MRI IMAGING | Facility: CLINIC | Age: 74
End: 2019-04-10
Attending: INTERNAL MEDICINE
Payer: COMMERCIAL

## 2019-04-10 DIAGNOSIS — Q61.2 ADPKD (AUTOSOMAL DOMINANT POLYCYSTIC KIDNEY DISEASE): ICD-10-CM

## 2019-04-10 DIAGNOSIS — Z86.79 HISTORY OF INTRACRANIAL HEMORRHAGE: ICD-10-CM

## 2019-04-12 ENCOUNTER — TELEPHONE (OUTPATIENT)
Dept: NEUROSURGERY | Facility: CLINIC | Age: 74
End: 2019-04-12

## 2019-04-12 ENCOUNTER — TELEPHONE (OUTPATIENT)
Dept: NEPHROLOGY | Facility: CLINIC | Age: 74
End: 2019-04-12

## 2019-04-12 DIAGNOSIS — I67.1 CEREBRAL ANEURYSM, NONRUPTURED: Primary | ICD-10-CM

## 2019-04-12 NOTE — TELEPHONE ENCOUNTER
I have called Mr Yeager and informed him of the results of his Brain MRI and Abd MRI.  I informed him of the finding of small aneurysm seen on brain MRA; and that I referred him to Dr Lokesh Robles for formal evaluation and management.  A referral order was placed.    From the renal point of view, the estimated total kidney volumes are as follow        His estimated risk category is IB        His estimated eGFR in 10 years is 24        Feroz Maria MD  Division of Renal Disease and Hypertension  Pager: [phone]  April 12, 2019  3:16 PM

## 2019-04-16 ASSESSMENT — ENCOUNTER SYMPTOMS
ORTHOPNEA: 0
MUSCLE WEAKNESS: 0
FLANK PAIN: 0
FEVER: 0
HEMATURIA: 0
WEIGHT LOSS: 0
SKIN CHANGES: 0
PALPITATIONS: 0
SYNCOPE: 0
ALTERED TEMPERATURE REGULATION: 0
BACK PAIN: 0
POOR WOUND HEALING: 0
DIFFICULTY URINATING: 0
HYPOTENSION: 0
HYPERTENSION: 1
MUSCLE CRAMPS: 1
NECK PAIN: 0
LIGHT-HEADEDNESS: 0
INCREASED ENERGY: 0
JOINT SWELLING: 1
NIGHT SWEATS: 0
CHILLS: 0
DECREASED APPETITE: 0
WEIGHT GAIN: 0
MYALGIAS: 0
DYSURIA: 0
EXERCISE INTOLERANCE: 0
POLYPHAGIA: 0
POLYDIPSIA: 1
ARTHRALGIAS: 1
STIFFNESS: 1
NAIL CHANGES: 0
LEG PAIN: 1
FATIGUE: 0
SLEEP DISTURBANCES DUE TO BREATHING: 0
HALLUCINATIONS: 0

## 2019-04-18 DIAGNOSIS — K40.90 INGUINAL HERNIA: Primary | ICD-10-CM

## 2019-04-18 NOTE — PROGRESS NOTES
Date: 4/18/2019    Time of Call: 12:46 PM     Diagnosis:  Newly Diagnosed Inguinal hernia     [ VORB ] Ordering provider: Dr. Feroz Maria  Order:  Referral to Gen Surgery )Dr. Ware     Order received by: Chitra Hall LPN     Follow-up/additional notes:

## 2019-04-23 ENCOUNTER — OFFICE VISIT (OUTPATIENT)
Dept: NEUROSURGERY | Facility: CLINIC | Age: 74
End: 2019-04-23
Payer: COMMERCIAL

## 2019-04-23 ENCOUNTER — TELEPHONE (OUTPATIENT)
Dept: SURGERY | Facility: CLINIC | Age: 74
End: 2019-04-23

## 2019-04-23 VITALS
WEIGHT: 159 LBS | BODY MASS INDEX: 24.1 KG/M2 | HEART RATE: 56 BPM | OXYGEN SATURATION: 97 % | TEMPERATURE: 98 F | RESPIRATION RATE: 15 BRPM | HEIGHT: 68 IN | DIASTOLIC BLOOD PRESSURE: 78 MMHG | SYSTOLIC BLOOD PRESSURE: 132 MMHG

## 2019-04-23 DIAGNOSIS — I67.1 CEREBRAL ANEURYSM: Primary | ICD-10-CM

## 2019-04-23 ASSESSMENT — PAIN SCALES - GENERAL: PAINLEVEL: NO PAIN (0)

## 2019-04-23 ASSESSMENT — MIFFLIN-ST. JEOR: SCORE: 1450.72

## 2019-04-23 NOTE — LETTER
4/23/2019      RE: Tariq Yeager  [address] [address]       See dictation    Lokesh Robles MD

## 2019-04-23 NOTE — LETTER
4/23/2019      RE: Tariq Yeager  [address] [address]       See dictation    Lokesh Robles MD

## 2019-04-23 NOTE — LETTER
4/23/2019       RE: Tariq Yeager  [address]  [place] [address]     Dear Colleague,    Thank you for referring your patient, Tariq Yeager, to the [facility] NEUROSURGERY at [facility]. Please see a copy of my visit note below.    See dictation    Again, thank you for allowing me to participate in the care of your patient.      Sincerely,    Lokesh Robles MD

## 2019-04-23 NOTE — LETTER
4/23/2019       RE: Tariq Yeager  [address]  [place] [facility] [place] [address]     Dear Colleague,    Thank you for referring your patient, Tariq Yeager, to the [facility] NEUROSURGERY at [facility]. Please see a copy of my visit note below.    See dictation    Again, thank you for allowing me to participate in the care of your patient.      Sincerely,    Lokesh Robles MD

## 2019-04-23 NOTE — LETTER
4/23/2019       RE: Tariq Yeager  [address]  [place] [facility] [place] [address]     Dear Colleague,    Thank you for referring your patient, Tariq Yeager, to the [facility] NEUROSURGERY at [facility]. Please see a copy of my visit note below.    See dictation    Again, thank you for allowing me to participate in the care of your patient.      Sincerely,    Lokesh Robles MD

## 2019-04-23 NOTE — TELEPHONE ENCOUNTER
Pre Visit Call and Assessment    Date of call:  04/23/2019    Phone numbers:  Home number on file [phone] (home)    Reached patient/confirmed appointment:  Yes  Patient care team/Primary provider:  No Ref-Primary, Physician    Referred to:  Dr. Ra Ware    Reason for visit:  Inguinal hernia consult

## 2019-04-23 NOTE — LETTER
4/23/2019      RE: Tariq Yeager  [address] [address]       See dictation    Lokesh Robles MD

## 2019-04-23 NOTE — PATIENT INSTRUCTIONS
1.  Follow up with Dr. Robles in 2 years with repeat MRA Head without contrast.    2.  Call Sarika RN Care Coordinator  for any questions/concerns.    Thank you for using [employer] for your healthcare needs.

## 2019-04-23 NOTE — LETTER
4/23/2019      RE: Tariq Yeager  [address] [address]       See dictation    Lokesh Robles MD

## 2019-04-23 NOTE — LETTER
4/23/2019       RE: Tariq Yeager  [address]  [place] [address]     Dear Colleague,    Thank you for referring your patient, Tariq Yeager, to the [facility] NEUROSURGERY at [facility]. Please see a copy of my visit note below.    See dictation    Again, thank you for allowing me to participate in the care of your patient.      Sincerely,    Lokesh Robles MD

## 2019-04-23 NOTE — LETTER
4/23/2019       RE: Tariq Yeager  [address]  [facility] [address]     Dear Colleague,    Thank you for referring your patient, Tariq Yeager, to the [facility] NEUROSURGERY at [facility]. Please see a copy of my visit note below.    See dictation    Again, thank you for allowing me to participate in the care of your patient.      Sincerely,    Lokesh Robles MD

## 2019-04-23 NOTE — LETTER
4/23/2019       RE: Tariq Yeager  [address]  [place] [address]     Dear Colleague,    Thank you for referring your patient, Tariq Yeager, to the [facility] NEUROSURGERY at [facility]. Please see a copy of my visit note below.    See dictation    Again, thank you for allowing me to participate in the care of your patient.      Sincerely,    Lokesh Robles MD

## 2019-04-23 NOTE — LETTER
4/23/2019       RE: Tariq Yeager  [address]  [place] [address]     Dear Colleague,    Thank you for referring your patient, Tariq Yeager, to the [facility] NEUROSURGERY at [facility]. Please see a copy of my visit note below.    See dictation    Again, thank you for allowing me to participate in the care of your patient.      Sincerely,    Lokesh Robles MD

## 2019-04-23 NOTE — LETTER
4/23/2019      RE: Tariq Yeager  [address] [address]       See dictation    Lokesh Robles MD

## 2019-04-23 NOTE — NURSING NOTE
Chief Complaint   Patient presents with     Intracranial Aneurysm     [facility] NEW, CEREBRAL ANEURYSM, NONRUPTURED        Yosi Crandall, EMT

## 2019-04-23 NOTE — LETTER
4/23/2019       RE: Tariq Yeager  [address]  [place] [address]     Dear Colleague,    Thank you for referring your patient, Tariq Yeager, to the [facility] NEUROSURGERY at [facility]. Please see a copy of my visit note below.    See dictation    Again, thank you for allowing me to participate in the care of your patient.      Sincerely,    Lokesh Robles MD

## 2019-04-25 ENCOUNTER — ANCILLARY PROCEDURE (OUTPATIENT)
Dept: ULTRASOUND IMAGING | Facility: CLINIC | Age: 74
End: 2019-04-25
Attending: SURGERY
Payer: COMMERCIAL

## 2019-04-25 ENCOUNTER — OFFICE VISIT (OUTPATIENT)
Dept: SURGERY | Facility: CLINIC | Age: 74
End: 2019-04-25
Attending: INTERNAL MEDICINE
Payer: COMMERCIAL

## 2019-04-25 VITALS
HEIGHT: 68 IN | SYSTOLIC BLOOD PRESSURE: 139 MMHG | HEART RATE: 64 BPM | BODY MASS INDEX: 24.71 KG/M2 | DIASTOLIC BLOOD PRESSURE: 80 MMHG | OXYGEN SATURATION: 99 % | WEIGHT: 163 LBS

## 2019-04-25 DIAGNOSIS — K40.90 LEFT INGUINAL HERNIA: ICD-10-CM

## 2019-04-25 DIAGNOSIS — K40.90 LEFT INGUINAL HERNIA: Primary | ICD-10-CM

## 2019-04-25 ASSESSMENT — MIFFLIN-ST. JEOR: SCORE: 1468.86

## 2019-04-25 ASSESSMENT — PAIN SCALES - GENERAL: PAINLEVEL: NO PAIN (0)

## 2019-04-25 NOTE — PATIENT INSTRUCTIONS
You met with Dr. Ra Ware.      Today's visit instructions:    Dr. Ware would like you to undergo a groin ultrasound.  He will call you with the results and recommendations.      If you have questions please contact Danyel RN or Jesusita RN during regular clinic hours, Monday through Friday 7:30 AM - 4:00 PM, or you can contact us via [employer]) at anytime.       If you have urgent needs after-hours, weekends, or holidays please call the hospital at [phone] and ask to speak with our on-call General Surgery Team.    Appointment [phone], option #1   Nurse Advice (Danyel or Jesusita): [phone]   Surgery Scheduler (Catherine): [phone]  Fax: [phone]

## 2019-04-25 NOTE — PROGRESS NOTES
Tariq Yeager is a 73 year old male with a 1 month history of a right inguinal  pain.    Finding was work related.  Onset did occur with lifting.  Obstructive symptoms:  no  Urinary difficulties:  no  Chronic cough: no  Constipation:  no  Current level of activity:  Medium, works as a potter, active.    Past medical and surgical history, medications, allergies, family history, and social history were reviewed with the patient.  Recently diagnosed wit PKD.  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      ROS: 10 point review of systems negative except noted in HPI  PHYSICAL EXAM  General appearance- healthy, alert, and in no distress.  Skin- Skin color and turgor normal.  No obvious rashes.  Neck- Neck is supple without obvious adenopathy.  Lungs- Respiratory effort unlabored.  Gait- Normal.  Abdomen - soft non distended, non tender without umbilical hernia  Left inguinal hernia, right inguinal canal without obvious weakness or hernia.  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  Given patient's newly diagnosed PKD would prefer confirmation of lesion before recommending surgery. The left inguinal hernia is asymptomatic and watchful waiting is ok for that condition.  Plan: limited ultrasound  Right inguinal to check if recurrence.  Will call patient with results and further recommendations.  The total time spent with this patient was 30 minutes.  Of this time, greater than 50% was spent counseling and coordinating care.      Addendum:  Results of ultrasound show no recurrence of hernia. Suspect musculoskeletal injury at mesh site and as such am recommending PT and local heat.  Findings and recommendations discussed with patient over phone.

## 2019-04-25 NOTE — LETTER
4/25/2019       RE: Tariq Yeager  [address]  [place] [address]     Dear Colleague,    Thank you for referring your patient, Tariq Yeager, to the [facility] GENERAL SURGERY at [facility]. Please see a copy of my visit note below.    Tariq Yeager is a 73 year old male with a 1 month history of a right inguinal  pain.    Finding was work related.  Onset did occur with lifting.  Obstructive symptoms:  no  Urinary difficulties:  no  Chronic cough: no  Constipation:  no  Current level of activity:  Medium, works as a potter, active.    Past medical and surgical history, medications, allergies, family history, and social history were reviewed with the patient.  Recently diagnosed wit PKD.  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      ROS: 10 point review of systems negative except noted in HPI  PHYSICAL EXAM  General appearance- healthy, alert, and in no distress.  Skin- Skin color and turgor normal.  No obvious rashes.  Neck- Neck is supple without obvious adenopathy.  Lungs- Respiratory effort unlabored.  Gait- Normal.  Abdomen - soft non distended, non tender without umbilical hernia  Left inguinal hernia, right inguinal canal without obvious weakness or hernia.  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  Given patient's newly diagnosed PKD would prefer confirmation of lesion before recommending surgery. The left inguinal hernia is asymptomatic and watchful waiting is ok for that condition.  Plan: limited ultrasound Right inguinal to check if recurrence.  Will call patient with results and further recommendations.  The total time spent with this patient was 30 minutes.  Of this time, greater than 50% was spent counseling and coordinating care.      Tariq Yeager is a 73 year old male with a 1 month history of a right inguinal  pain.    Finding was work related.  Onset did occur with lifting.  Obstructive symptoms:  no  Urinary difficulties:  no  Chronic cough: no  Constipation:  no  Current level of activity:  Medium, works as a potter, active.    Past medical and surgical history, medications, allergies, family history, and social history were reviewed with the patient.  Recently diagnosed wit PKD.  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      ROS: 10 point review of systems negative except noted in HPI  PHYSICAL EXAM  General appearance- healthy, alert, and in no distress.  Skin- Skin color and turgor normal.  No obvious rashes.  Neck- Neck is supple without obvious adenopathy.  Lungs- Respiratory effort  unlabored.  Gait- Normal.  Abdomen - soft non distended, non tender without umbilical hernia  Left inguinal hernia, right inguinal canal without obvious weakness or hernia.  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  Given patient's newly diagnosed PKD would prefer confirmation of lesion before recommending surgery. The left inguinal hernia is asymptomatic and watchful waiting is ok for that condition.  Plan: limited ultrasound Right inguinal to check if recurrence.  Will call patient with results and further recommendations.  The total time spent with this patient was 30 minutes.  Of this time, greater than 50% was spent counseling and coordinating care.    Addendum:  Results of ultrasound show no recurrence of hernia. Suspect musculoskeletal injury at mesh site and as such am recommending PT and local heat.  Findings and recommendations discussed with patient over phone.    Again, thank you for allowing me to participate in the care of your patient.      Sincerely,    Ra Ware MD

## 2019-04-25 NOTE — NURSING NOTE
"Chief Complaint   Patient presents with     New Patient     New hernia consult       Vitals:    04/25/19 0711 04/25/19 0716   BP: 147/81 139/80   Pulse: 64    SpO2: 99%    Weight: 73.9 kg (163 lb)    Height: 1.727 m (5' 8\")        Body mass index is 24.78 kg/m .    Amina Mancini CMA    "

## 2019-06-05 DIAGNOSIS — Q61.2 ADPKD (AUTOSOMAL DOMINANT POLYCYSTIC KIDNEY DISEASE): Primary | ICD-10-CM

## 2019-06-13 ENCOUNTER — OFFICE VISIT (OUTPATIENT)
Dept: NEPHROLOGY | Facility: CLINIC | Age: 74
End: 2019-06-13
Attending: INTERNAL MEDICINE
Payer: MEDICARE

## 2019-06-13 VITALS
TEMPERATURE: 98.3 F | HEART RATE: 55 BPM | BODY MASS INDEX: 24.31 KG/M2 | HEIGHT: 68 IN | OXYGEN SATURATION: 97 % | WEIGHT: 160.4 LBS | DIASTOLIC BLOOD PRESSURE: 76 MMHG | SYSTOLIC BLOOD PRESSURE: 130 MMHG | RESPIRATION RATE: 14 BRPM

## 2019-06-13 DIAGNOSIS — N18.30 CKD (CHRONIC KIDNEY DISEASE) STAGE 3, GFR 30-59 ML/MIN (H): Primary | ICD-10-CM

## 2019-06-13 DIAGNOSIS — Q61.2 ADPKD (AUTOSOMAL DOMINANT POLYCYSTIC KIDNEY DISEASE): ICD-10-CM

## 2019-06-13 LAB
ALBUMIN SERPL-MCNC: 3.9 G/DL (ref 3.4–5)
ALBUMIN UR-MCNC: NEGATIVE MG/DL
ALP SERPL-CCNC: 63 U/L (ref 40–150)
ALT SERPL W P-5'-P-CCNC: 19 U/L (ref 0–70)
ANION GAP SERPL CALCULATED.3IONS-SCNC: 5 MMOL/L (ref 3–14)
APPEARANCE UR: CLEAR
AST SERPL W P-5'-P-CCNC: 16 U/L (ref 0–45)
BILIRUB SERPL-MCNC: 0.3 MG/DL (ref 0.2–1.3)
BILIRUB UR QL STRIP: NEGATIVE
BUN SERPL-MCNC: 29 MG/DL (ref 7–30)
CALCIUM SERPL-MCNC: 8.5 MG/DL (ref 8.5–10.1)
CHLORIDE SERPL-SCNC: 109 MMOL/L (ref 94–109)
CO2 SERPL-SCNC: 26 MMOL/L (ref 20–32)
COLOR UR AUTO: YELLOW
CREAT SERPL-MCNC: 1.28 MG/DL (ref 0.66–1.25)
CREAT UR-MCNC: 148 MG/DL
ERYTHROCYTE [DISTWIDTH] IN BLOOD BY AUTOMATED COUNT: 12.7 % (ref 10–15)
GFR SERPL CREATININE-BSD FRML MDRD: 56 ML/MIN/{1.73_M2}
GLUCOSE SERPL-MCNC: 91 MG/DL (ref 70–99)
GLUCOSE UR STRIP-MCNC: NEGATIVE MG/DL
HCT VFR BLD AUTO: 38.9 % (ref 40–53)
HGB BLD-MCNC: 13.3 G/DL (ref 13.3–17.7)
HGB UR QL STRIP: NEGATIVE
KETONES UR STRIP-MCNC: NEGATIVE MG/DL
LEUKOCYTE ESTERASE UR QL STRIP: NEGATIVE
MCH RBC QN AUTO: 32.8 PG (ref 26.5–33)
MCHC RBC AUTO-ENTMCNC: 34.2 G/DL (ref 31.5–36.5)
MCV RBC AUTO: 96 FL (ref 78–100)
NITRATE UR QL: NEGATIVE
PH UR STRIP: 5 PH (ref 5–7)
PLATELET # BLD AUTO: 207 10E9/L (ref 150–450)
POTASSIUM SERPL-SCNC: 4.1 MMOL/L (ref 3.4–5.3)
PROT SERPL-MCNC: 6.4 G/DL (ref 6.8–8.8)
PROT UR-MCNC: 0.16 G/L
PROT/CREAT 24H UR: 0.11 G/G CR (ref 0–0.2)
RBC # BLD AUTO: 4.05 10E12/L (ref 4.4–5.9)
RBC #/AREA URNS AUTO: 2 /HPF (ref 0–2)
SODIUM SERPL-SCNC: 140 MMOL/L (ref 133–144)
SOURCE: NORMAL
SP GR UR STRIP: 1.02 (ref 1–1.03)
SQUAMOUS #/AREA URNS AUTO: <1 /HPF (ref 0–1)
UROBILINOGEN UR STRIP-MCNC: 0 MG/DL (ref 0–2)
WBC # BLD AUTO: 6.3 10E9/L (ref 4–11)
WBC #/AREA URNS AUTO: <1 /HPF (ref 0–5)

## 2019-06-13 PROCEDURE — 85027 COMPLETE CBC AUTOMATED: CPT | Performed by: INTERNAL MEDICINE

## 2019-06-13 PROCEDURE — 36415 COLL VENOUS BLD VENIPUNCTURE: CPT | Performed by: INTERNAL MEDICINE

## 2019-06-13 PROCEDURE — 81001 URINALYSIS AUTO W/SCOPE: CPT | Performed by: INTERNAL MEDICINE

## 2019-06-13 PROCEDURE — 84156 ASSAY OF PROTEIN URINE: CPT | Performed by: INTERNAL MEDICINE

## 2019-06-13 PROCEDURE — G0463 HOSPITAL OUTPT CLINIC VISIT: HCPCS | Mod: ZF

## 2019-06-13 PROCEDURE — 80053 COMPREHEN METABOLIC PANEL: CPT | Performed by: INTERNAL MEDICINE

## 2019-06-13 ASSESSMENT — PAIN SCALES - GENERAL: PAINLEVEL: NO PAIN (0)

## 2019-06-13 ASSESSMENT — MIFFLIN-ST. JEOR: SCORE: 1457.07

## 2019-06-13 NOTE — LETTER
6/13/2019      RE: Tariq Yeager  [address]  [place] [address]       Nephrology Clinic    Tariq Yeager MRN:[id number] YOB: 1945  Date of Service: 06/13/2019  Primary care provider: Jo Ref-Primary, Physician  Requesting physician: Kristina Alvarado      REASON FOR CONSULT: Polycystic kidney disease    HISTORY OF PRESENT ILLNESS:   Tariq Yeager is a 73 year old male who presents for evaluation of multiple kidney cysts.    Mr Yeager has recently been informed about the presence of bilateral renal cysts which were incidentally identified by MRI of the spine he received in early February 2018 for back pain.  The presence of bilateral renal cysts was confirmed by ultrasound also obtained recently. (2/11/19 [facility])(see below).  He denies any history of gross hematuria, flank pain, urolithiasis, or UTIs.  He does however have a history of acute sudden incerebral hemorrhage in 2002 for which he had a prolonged ICU hospitalization at [facility] .  Note is made that he also underwent placement of a permanent IVC filter, although Mr Yeager does not recall having had a DVT.  Prior to that event, it does not appear that he was in good health.  He had an inguinal herniorrhaphy on the R and reports a recurrence of the hernia + new Inguinal hernia on the L.  No history of diverticulosis     June 13, 2019  Since the last visit, he had an MRI of Brain and ABd.  MRI of the brain revealed a small aneurysm for which I put in a referral to Dr STAN Robles.  Aneurysm was deemed very small and recommended repeat MRI in 2 year.  MRI of Abd revealed an estimated total kidney volume of 1627 ml, with class IB risk  category of progression of ADPKD, with an estimated projected eGFR ~ 24 ml/min in 10 years.    He is moderating his caffeine intake, salt intake.    Still swims 3 x a week.  Feels generally well.  No HA.     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     Hypertension 2002     Inguinal hernia 2016     Presence of IVC filter 2002     PAST SURGICAL HISTORY:  Past Surgical History:   Procedure Laterality Date     cerebral aneurysm coil  2002     CRANIOTOMY, REPAIR ANEURYSM, COMBINED  2002     HERNIORRHAPHY INGUINAL Right 2016     MEDICATIONS:  Prescription Medications as of 6/13/2019       Rx Number Disp Refills Start End Last Dispensed Date Next Fill Date Owning Pharmacy    amLODIPine (NORVASC) 10 MG tablet  90 tablet 3 3/28/2019    [facility] [address], [place] - [address] [facility] [address] [facility]    Sig: Take 1 tablet (10 mg) by mouth daily    Class: E-Prescribe    Route: Oral    lisinopril (PRINIVIL/ZESTRIL) 10 MG tablet  90 tablet 3 3/28/2019    [facility] [place] [id number] - [place], [place] - [address] [facility] [address] [facility]    Sig: Take 1 tablet (10 mg) by mouth daily    Class: E-Prescribe    Route: Oral         ALLERGIES:    Allergies   Allergen Reactions     Dilantin [Phenytoin] Rash     REVIEW OF SYSTEMS:  A comprehensive review of systems was performed and found to be negative except as described here or above. + history of recurrent epistaxis (last episode 2 months ago)      SOCIAL HISTORY:   Social History     Socioeconomic History     Marital status:      Spouse name: Not on file     Number of children: Not on file     Years of education: Not on file     Highest education level: Not on file   Occupational History     Not on file   Social Needs     Financial resource strain: Not on file     Food insecurity:     Worry: Not on file     Inability: Not on file     Transportation needs:     Medical: Not on file     Non-medical: Not on file   Tobacco Use     Smoking status: Never Smoker     Smokeless tobacco: Never Used   Substance and Sexual Activity     Alcohol use:  "No     Frequency: Never.  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  Remote history of use > 40 yrs ago     Sexual activity: Not on file   Lifestyle     Physical activity:     Days per week: Swims 3x/week     Minutes per session: 25 min     Stress: Not on file   Relationships   Other Topics Concern     Not on file   Social History Narrative     Not on file     FAMILY MEDICAL HISTORY:   Family History   Problem Relation Age of Onset     Kidney failure Mother      Cystic Kidney Disease Sister    Sister (70) was recently diagnosed with PKD by MRI, with multiple bilat cyst  Brother (77) with possible PKD (with few cysts on one side only)  Sister (75) does not want to be evaluated, but used to run marathons, and still runs half-marathons.    PHYSICAL EXAM:   /76   Pulse 55   Temp 98.3  F (36.8  C) (Oral)   Resp 14   Ht 1.727 m (5' 8\")   Wt 72.8 kg (160 lb 6.4 oz)   SpO2 97%   BMI 24.39 kg/m     GENERAL APPEARANCE: alert and no distress  EYES: nonicteric  HENT: mouth without ulcers or lesions  NECK: supple, no adenopathy  RESP: lungs clear to auscultation   CV: regular rhythm, normal rate, no rub  ABDOMEN: soft, nontender, normal bowel sounds, no HSM . Kidneys are palpable R>L  Extremities: no edema  MS: no evidence of inflammation in joints, no muscle tenderness  SKIN: no rash  NEURO: mentation intact and speech normal  PSYCH: affect normal/bright   LABS:   Recent Results (from the past 1008 hour(s))   CBC with platelets    Collection Time: 06/13/19  1:54 PM   Result Value Ref Range    WBC 6.3 4.0 - 11.0 10e9/L    RBC Count 4.05 (L) 4.4 - 5.9 10e12/L    Hemoglobin 13.3 13.3 - 17.7 g/dL    Hematocrit 38.9 (L) 40.0 - 53.0 %    MCV 96 78 - 100 fl    MCH 32.8 26.5 - 33.0 pg    MCHC 34.2 31.5 - 36.5 g/dL    RDW 12.7 10.0 - 15.0 %    Platelet Count 207 150 - 450 10e9/L   Comprehensive metabolic panel    Collection Time: 06/13/19  1:54 PM   Result Value Ref Range    Sodium 140 133 - " 144 mmol/L    Potassium 4.1 3.4 - 5.3 mmol/L    Chloride 109 94 - 109 mmol/L    Carbon Dioxide 26 20 - 32 mmol/L    Anion Gap 5 3 - 14 mmol/L    Glucose 91 70 - 99 mg/dL    Urea Nitrogen 29 7 - 30 mg/dL    Creatinine 1.28 (H) 0.66 - 1.25 mg/dL    GFR Estimate 56 (L) >60 mL/min/[1.73_m2]    GFR Estimate If Black 65 >60 mL/min/[1.73_m2]    Calcium 8.5 8.5 - 10.1 mg/dL    Bilirubin Total 0.3 0.2 - 1.3 mg/dL    Albumin 3.9 3.4 - 5.0 g/dL    Protein Total 6.4 (L) 6.8 - 8.8 g/dL    Alkaline Phosphatase 63 40 - 150 U/L    ALT 19 0 - 70 U/L    AST 16 0 - 45 U/L   Routine UA with micro reflex to culture    Collection Time: 06/13/19  2:00 PM   Result Value Ref Range    Color Urine Yellow     Appearance Urine Clear     Glucose Urine Negative NEG^Negative mg/dL    Bilirubin Urine Negative NEG^Negative    Ketones Urine Negative NEG^Negative mg/dL    Specific Gravity Urine 1.018 1.003 - 1.035    Blood Urine Negative NEG^Negative    pH Urine 5.0 5.0 - 7.0 pH    Protein Albumin Urine Negative NEG^Negative mg/dL    Urobilinogen mg/dL 0.0 0.0 - 2.0 mg/dL    Nitrite Urine Negative NEG^Negative    Leukocyte Esterase Urine Negative NEG^Negative    Source Midstream Urine     WBC Urine <1 0 - 5 /HPF    RBC Urine 2 0 - 2 /HPF    Squamous Epithelial /HPF Urine <1 0 - 1 /HPF   Protein  random urine with Creat Ratio    Collection Time: 06/13/19  2:00 PM   Result Value Ref Range    Protein Random Urine 0.16 g/L    Protein Total Urine g/gr Creatinine 0.11 0 - 0.2 g/g Cr   Creatinine urine calculation only    Collection Time: 06/13/19  2:00 PM   Result Value Ref Range    Creatinine Urine 148 mg/dL     CMP  Recent Labs   Lab Test 06/13/19  1354 04/05/19  1249 03/28/19  1456    143 142   POTASSIUM 4.1 4.0 4.0   CHLORIDE 109 106 109   CO2 26 28 27   ANIONGAP 5  --  5   GLC 91 105* 106*   BUN 29 27 31*   CR 1.28* 1.29 1.17   GFRESTIMATED 56* 55 62   GFRESTBLACK 65  --  72   JOHN 8.5 8.8 8.7   PROTTOTAL 6.4*  --  6.7*   ALBUMIN 3.9 4.4 3.9    BILITOTAL 0.3  --  0.3   ALKPHOS 63  --  65   AST 16  --  18   ALT 19  --  24     CBC  Recent Labs   Lab Test 06/13/19  1354 03/28/19  1456   HGB 13.3 13.7   WBC 6.3 6.4   RBC 4.05* 4.35*   HCT 38.9* 41.5   MCV 96 95   MCH 32.8 31.5   MCHC 34.2 33.0   RDW 12.7 12.5    218       Recent Labs   Lab Test 06/13/19  1400 03/28/19  1501   UTPG 0.11 0.10               ASSESSMENT AND PLAN:   Mr Yeager presents with concerns about recently finding that he has multiple cysts on both kidneys found incidentally when he had an MRI of the spine.  This was confirmed by US.    We reviewed together the results of the MRI, the estimated TKV, and the estimated anticipated rate of progression of CKD given the current data.  I explained that he would most likely not benefit from Tolvaptan.  He is also reassured by the neurosurgery evaluation, as well as gen surgery evaluation.  I commended him on his physical activity.  His BP is good.  I encouraged him to limit Na intake  I made no changes to his medications.   F/U in 6 months    Feroz Maria MD  Division of Renal Disease and Hypertension  June 13, 2019

## 2019-06-13 NOTE — NURSING NOTE
"Chief Complaint   Patient presents with     RECHECK     polycystic kidney disease       Vital signs:  Temp: 98.3  F (36.8  C) Temp src: Oral BP: 130/76 Pulse: 55   Resp: 14 SpO2: 97 %     Height: 172.7 cm (5' 8\") Weight: 72.8 kg (160 lb 6.4 oz)  Estimated body mass index is 24.39 kg/m  as calculated from the following:    Height as of this encounter: 1.727 m (5' 8\").    Weight as of this encounter: 72.8 kg (160 lb 6.4 oz).          Jyoti Blake [facility]  6/13/2019 3:03 PM      "

## 2019-06-13 NOTE — LETTER
6/13/2019       RE: Tariq Yeager  [address]  [place] [address]     Dear Colleague,    Thank you for referring your patient, Tariq Yeager, to the [facility] NEPHROLOGY at [facility]. Please see a copy of my visit note below.    Nephrology Clinic    Tariq Yeager MRN:[id number] YOB: 1945  Date of Service: 06/13/2019  Primary care provider: No Ref-Primary, Physician  Requesting physician: Kristina Alvarado      REASON FOR CONSULT: Polycystic kidney disease    HISTORY OF PRESENT ILLNESS:   Tariq Yeager is a 73 year old male who presents for evaluation of multiple kidney cysts.    Mr Yeager has recently been informed about the presence of bilateral renal cysts which were incidentally identified by MRI of the spine he received in early February 2018 for back pain.  The presence of bilateral renal cysts was confirmed by ultrasound also obtained recently. (2/11/19 [facility])(see below).  He denies any history of gross hematuria, flank pain, urolithiasis, or UTIs.  He does however have a history of acute sudden incerebral hemorrhage in 2002 for which he had a prolonged ICU hospitalization at [facility] .  Note is made that he also underwent placement of a permanent IVC filter, although Mr Yeager does not recall having had a DVT.  Prior to that event, it does not appear that he was in good health.  He had an inguinal herniorrhaphy on the R and reports a recurrence of the hernia + new Inguinal hernia on the L.  No history of diverticulosis     June 13, 2019  Since the last visit, he had an MRI of Brain and ABd.  MRI of the brain revealed a small aneurysm for which I put in a referral to Dr STAN Robles.  Aneurysm was deemed very small and recommended repeat MRI in 2 year.  MRI of Abd revealed an estimated total kidney volume of 1627 ml, with class IB risk  category of progression of ADPKD, with an estimated projected eGFR ~ 24 ml/min in 10  years.    He is moderating his caffeine intake, salt intake.    Still swims 3 x a week.  Feels generally well.  No HA.     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     Hypertension 2002     Inguinal hernia 2016     Presence of IVC filter 2002     PAST SURGICAL HISTORY:  Past Surgical History:   Procedure Laterality Date     cerebral aneurysm coil  2002     CRANIOTOMY, REPAIR ANEURYSM, COMBINED  2002     HERNIORRHAPHY INGUINAL Right 2016     MEDICATIONS:  Prescription Medications as of 6/13/2019       Rx Number Disp Refills Start End Last Dispensed Date Next Fill Date Owning Pharmacy    amLODIPine (NORVASC) 10 MG tablet  90 tablet 3 3/28/2019    [facility] [address] [place] [address] [address] AT [facility]    Sig: Take 1 tablet (10 mg) by mouth daily    Class: E-Prescribe    Route: Oral    lisinopril (PRINIVIL/ZESTRIL) 10 MG tablet  90 tablet 3 3/28/2019    [facility] [address] [place] [address] [address] AT [facility]    Sig: Take 1 tablet (10 mg) by mouth daily    Class: E-Prescribe    Route: Oral         ALLERGIES:    Allergies   Allergen Reactions     Dilantin [Phenytoin] Rash     REVIEW OF SYSTEMS:  A comprehensive review of systems was performed and found to be negative except as described here or above. + history of recurrent epistaxis (last episode 2 months ago)      SOCIAL HISTORY:   Social History     Socioeconomic History     Marital status:      Spouse name: Not on file     Number of children: Not on file     Years of education: Not on file     Highest education level: Not on file   Occupational History     Not on file   Social Needs     Financial resource strain: Not on file     Food insecurity:     Worry: Not on file     Inability: Not on file      "Transportation needs:     Medical: Not on file     Non-medical: Not on file   Tobacco Use     Smoking status: Never Smoker     Smokeless tobacco: Never Used   Substance and Sexual Activity     Alcohol use: No     Frequency: Never.  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  Remote history of use > 40 yrs ago     Sexual activity: Not on file   Lifestyle     Physical activity:     Days per week: Swims 3x/week     Minutes per session: 25 min     Stress: Not on file   Relationships   Other Topics Concern     Not on file   Social History Narrative     Not on file     FAMILY MEDICAL HISTORY:   Family History   Problem Relation Age of Onset     Kidney failure Mother      Cystic Kidney Disease Sister    Sister (70) was recently diagnosed with PKD by MRI, with multiple bilat cyst  Brother (77) with possible PKD (with few cysts on one side only)  Sister (75) does not want to be evaluated, but used to run marathons, and still runs half-marathons.    PHYSICAL EXAM:   /76   Pulse 55   Temp 98.3  F (36.8  C) (Oral)   Resp 14   Ht 1.727 m (5' 8\")   Wt 72.8 kg (160 lb 6.4 oz)   SpO2 97%   BMI 24.39 kg/m     GENERAL APPEARANCE: alert and no distress  EYES: nonicteric  HENT: mouth without ulcers or lesions  NECK: supple, no adenopathy  RESP: lungs clear to auscultation   CV: regular rhythm, normal rate, no rub  ABDOMEN: soft, nontender, normal bowel sounds, no HSM . Kidneys are palpable R>L  Extremities: no edema  MS: no evidence of inflammation in joints, no muscle tenderness  SKIN: no rash  NEURO: mentation intact and speech normal  PSYCH: affect normal/bright   LABS:   Recent Results (from the past 1008 hour(s))   CBC with platelets    Collection Time: 06/13/19  1:54 PM   Result Value Ref Range    WBC 6.3 4.0 - 11.0 10e9/L    RBC Count 4.05 (L) 4.4 - 5.9 10e12/L    Hemoglobin 13.3 13.3 - 17.7 g/dL    Hematocrit 38.9 (L) 40.0 - 53.0 %    MCV 96 78 - 100 fl    MCH 32.8 26.5 - 33.0 pg    " MCHC 34.2 31.5 - 36.5 g/dL    RDW 12.7 10.0 - 15.0 %    Platelet Count 207 150 - 450 10e9/L   Comprehensive metabolic panel    Collection Time: 06/13/19  1:54 PM   Result Value Ref Range    Sodium 140 133 - 144 mmol/L    Potassium 4.1 3.4 - 5.3 mmol/L    Chloride 109 94 - 109 mmol/L    Carbon Dioxide 26 20 - 32 mmol/L    Anion Gap 5 3 - 14 mmol/L    Glucose 91 70 - 99 mg/dL    Urea Nitrogen 29 7 - 30 mg/dL    Creatinine 1.28 (H) 0.66 - 1.25 mg/dL    GFR Estimate 56 (L) >60 mL/min/[1.73_m2]    GFR Estimate If Black 65 >60 mL/min/[1.73_m2]    Calcium 8.5 8.5 - 10.1 mg/dL    Bilirubin Total 0.3 0.2 - 1.3 mg/dL    Albumin 3.9 3.4 - 5.0 g/dL    Protein Total 6.4 (L) 6.8 - 8.8 g/dL    Alkaline Phosphatase 63 40 - 150 U/L    ALT 19 0 - 70 U/L    AST 16 0 - 45 U/L   Routine UA with micro reflex to culture    Collection Time: 06/13/19  2:00 PM   Result Value Ref Range    Color Urine Yellow     Appearance Urine Clear     Glucose Urine Negative NEG^Negative mg/dL    Bilirubin Urine Negative NEG^Negative    Ketones Urine Negative NEG^Negative mg/dL    Specific Gravity Urine 1.018 1.003 - 1.035    Blood Urine Negative NEG^Negative    pH Urine 5.0 5.0 - 7.0 pH    Protein Albumin Urine Negative NEG^Negative mg/dL    Urobilinogen mg/dL 0.0 0.0 - 2.0 mg/dL    Nitrite Urine Negative NEG^Negative    Leukocyte Esterase Urine Negative NEG^Negative    Source Midstream Urine     WBC Urine <1 0 - 5 /HPF    RBC Urine 2 0 - 2 /HPF    Squamous Epithelial /HPF Urine <1 0 - 1 /HPF   Protein  random urine with Creat Ratio    Collection Time: 06/13/19  2:00 PM   Result Value Ref Range    Protein Random Urine 0.16 g/L    Protein Total Urine g/gr Creatinine 0.11 0 - 0.2 g/g Cr   Creatinine urine calculation only    Collection Time: 06/13/19  2:00 PM   Result Value Ref Range    Creatinine Urine 148 mg/dL     CMP  Recent Labs   Lab Test 06/13/19  1354 04/05/19  1249 03/28/19  1456    143 142   POTASSIUM 4.1 4.0 4.0   CHLORIDE 109 106 109   CO2  26 28 27   ANIONGAP 5  --  5   GLC 91 105* 106*   BUN 29 27 31*   CR 1.28* 1.29 1.17   GFRESTIMATED 56* 55 62   GFRESTBLACK 65  --  72   JOHN 8.5 8.8 8.7   PROTTOTAL 6.4*  --  6.7*   ALBUMIN 3.9 4.4 3.9   BILITOTAL 0.3  --  0.3   ALKPHOS 63  --  65   AST 16  --  18   ALT 19  --  24     CBC  Recent Labs   Lab Test 06/13/19  1354 03/28/19  1456   HGB 13.3 13.7   WBC 6.3 6.4   RBC 4.05* 4.35*   HCT 38.9* 41.5   MCV 96 95   MCH 32.8 31.5   MCHC 34.2 33.0   RDW 12.7 12.5    218       Recent Labs   Lab Test 06/13/19  1400 03/28/19  1501   UTPG 0.11 0.10               ASSESSMENT AND PLAN:   Mr Yeager presents with concerns about recently finding that he has multiple cysts on both kidneys found incidentally when he had an MRI of the spine.  This was confirmed by US.    We reviewed together the results of the MRI, the estimated TKV, and the estimated anticipated rate of progression of CKD given the current data.  I explained that he would most likely not benefit from Tolvaptan.  He is also reassured by the neurosurgery evaluation, as well as gen surgery evaluation.  I commended him on his physical activity.  His BP is good.  I encouraged him to limit Na intake  I made no changes to his medications.   F/U in 6 months    Feroz Maria MD  Division of Renal Disease and Hypertension  June 13, 2019

## 2019-06-13 NOTE — PATIENT INSTRUCTIONS
Preventive Care:    Colorectal Cancer Screening: During our visit today, we discussed that it is recommended you receive colorectal cancer screening. Please call or make an appointment with your primary care provider to discuss this. You may also call the [facility] scheduling line ([phone]) to set up a colonoscopy appointment.

## 2019-06-13 NOTE — PROGRESS NOTES
Nephrology Clinic    Tariq Yeager MRN:[id number] YOB: 1945  Date of Service: 06/13/2019  Primary care provider: No Ref-Primary, Physician  Requesting physician: Kristina Alvarado      REASON FOR CONSULT: Polycystic kidney disease    HISTORY OF PRESENT ILLNESS:   Tariq Yeager is a 73 year old male who presents for evaluation of multiple kidney cysts.    Mr Yeager has recently been informed about the presence of bilateral renal cysts which were incidentally identified by MRI of the spine he received in early February 2018 for back pain.  The presence of bilateral renal cysts was confirmed by ultrasound also obtained recently. (2/11/19 [facility])(see below).  He denies any history of gross hematuria, flank pain, urolithiasis, or UTIs.  He does however have a history of acute sudden incerebral hemorrhage in 2002 for which he had a prolonged ICU hospitalization at [facility] .  Note is made that he also underwent placement of a permanent IVC filter, although Mr Yeager does not recall having had a DVT.  Prior to that event, it does not appear that he was in good health.  He had an inguinal herniorrhaphy on the R and reports a recurrence of the hernia + new Inguinal hernia on the L.  No history of diverticulosis     June 13, 2019  Since the last visit, he had an MRI of Brain and ABd.  MRI of the brain revealed a small aneurysm for which I put in a referral to Dr STAN Robles.  Aneurysm was deemed very small and recommended repeat MRI in 2 year.  MRI of Abd revealed an estimated total kidney volume of 1627 ml, with class IB risk  category of progression of ADPKD, with an estimated projected eGFR ~ 24 ml/min in 10 years.    He is moderating his caffeine intake, salt intake.    Still swims 3 x a week.  Feels generally well.  No HA.     PAST MEDICAL HISTORY:  Past Medical History:   Diagnosis Date     ADPKD (autosomal dominant polycystic kidney disease) 02/11/2019     Cerebral hemorrhage  "(H) 2002     Degenerative joint disease (DJD) of lumbar spine 02/06/2019     Hypertension 2002     Inguinal hernia 2016     Presence of IVC filter 2002     PAST SURGICAL HISTORY:  Past Surgical History:   Procedure Laterality Date     cerebral aneurysm coil  2002     CRANIOTOMY, REPAIR ANEURYSM, COMBINED  2002     HERNIORRHAPHY INGUINAL Right 2016     MEDICATIONS:  Prescription Medications as of 6/13/2019       Rx Number Disp Refills Start End Last Dispensed Date Next Fill Date Owning Pharmacy    amLODIPine (NORVASC) 10 MG tablet  90 tablet 3 3/28/2019    [facility] [address] [place] [address], [place] - [address] AT [facility] [employer] [facility]    Sig: Take 1 tablet (10 mg) by mouth daily    Class: E-Prescribe    Route: Oral    lisinopril (PRINIVIL/ZESTRIL) 10 MG tablet  90 tablet 3 3/28/2019    [facility] [address] - [place], [place] - [address] AT [facility] [employer] [facility]    Sig: Take 1 tablet (10 mg) by mouth daily    Class: E-Prescribe    Route: Oral         ALLERGIES:    Allergies   Allergen Reactions     Dilantin [Phenytoin] Rash     REVIEW OF SYSTEMS:  A comprehensive review of systems was performed and found to be negative except as described here or above. + history of recurrent epistaxis (last episode 2 months ago)      SOCIAL HISTORY:   Social History     Socioeconomic History     Marital status:      Spouse name: Not on file     Number of children: Not on file     Years of education: Not on file     Highest education level: Not on file   Occupational History     Not on file   Social Needs     Financial resource strain: Not on file     Food insecurity:     Worry: Not on file     Inability: Not on file     Transportation needs:     Medical: Not on file     Non-medical: Not on file   Tobacco Use     Smoking status: Never Smoker     Smokeless tobacco: Never Used   Substance and Sexual Activity     Alcohol use: No     Frequency: Never.  Sober x 40 years.  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  Remote history of use > 40 yrs ago     Sexual activity: Not on file   Lifestyle     Physical activity:     Days per week: Swims 3x/week     Minutes per session: 25 min     Stress: Not on file   Relationships   Other Topics Concern     Not on file   Social History Narrative     Not on file     FAMILY MEDICAL HISTORY:   Family History   Problem Relation Age of Onset     Kidney failure Mother      Cystic Kidney Disease Sister    Sister (70) was recently diagnosed with PKD by MRI, with multiple bilat cyst  Brother (77) with possible PKD (with few cysts on one side only)  Sister (75) does not want to be evaluated, but used to run marathons, and still runs half-marathons.    PHYSICAL EXAM:   /76   Pulse 55   Temp 98.3  F (36.8  C) (Oral)   Resp 14   Ht 1.727 m (5' 8\")   Wt 72.8 kg (160 lb 6.4 oz)   SpO2 97%   BMI 24.39 kg/m    GENERAL APPEARANCE: alert and no distress  EYES: nonicteric  HENT: mouth without ulcers or lesions  NECK: supple, no adenopathy  RESP: lungs clear to auscultation   CV: regular rhythm, normal rate, no rub  ABDOMEN: soft, nontender, normal bowel sounds, no HSM . Kidneys are palpable R>L  Extremities: no edema  MS: no evidence of inflammation in joints, no muscle tenderness  SKIN: no rash  NEURO: mentation intact and speech normal  PSYCH: affect normal/bright   LABS:   Recent Results (from the past 1008 hour(s))   CBC with platelets    Collection Time: 06/13/19  1:54 PM   Result Value Ref Range    WBC 6.3 4.0 - 11.0 10e9/L    RBC Count 4.05 (L) 4.4 - 5.9 10e12/L    Hemoglobin 13.3 13.3 - 17.7 g/dL    Hematocrit 38.9 (L) 40.0 - 53.0 %    MCV 96 78 - 100 fl    MCH 32.8 26.5 - 33.0 pg    MCHC 34.2 31.5 - 36.5 g/dL    RDW 12.7 10.0 - 15.0 %    Platelet Count 207 150 - 450 10e9/L   Comprehensive metabolic panel    Collection Time: 06/13/19  1:54 PM   Result Value Ref Range    Sodium 140 133 - 144 mmol/L    Potassium 4.1 3.4 - 5.3 mmol/L    Chloride 109 94 - 109 mmol/L    " Carbon Dioxide 26 20 - 32 mmol/L    Anion Gap 5 3 - 14 mmol/L    Glucose 91 70 - 99 mg/dL    Urea Nitrogen 29 7 - 30 mg/dL    Creatinine 1.28 (H) 0.66 - 1.25 mg/dL    GFR Estimate 56 (L) >60 mL/min/[1.73_m2]    GFR Estimate If Black 65 >60 mL/min/[1.73_m2]    Calcium 8.5 8.5 - 10.1 mg/dL    Bilirubin Total 0.3 0.2 - 1.3 mg/dL    Albumin 3.9 3.4 - 5.0 g/dL    Protein Total 6.4 (L) 6.8 - 8.8 g/dL    Alkaline Phosphatase 63 40 - 150 U/L    ALT 19 0 - 70 U/L    AST 16 0 - 45 U/L   Routine UA with micro reflex to culture    Collection Time: 06/13/19  2:00 PM   Result Value Ref Range    Color Urine Yellow     Appearance Urine Clear     Glucose Urine Negative NEG^Negative mg/dL    Bilirubin Urine Negative NEG^Negative    Ketones Urine Negative NEG^Negative mg/dL    Specific Gravity Urine 1.018 1.003 - 1.035    Blood Urine Negative NEG^Negative    pH Urine 5.0 5.0 - 7.0 pH    Protein Albumin Urine Negative NEG^Negative mg/dL    Urobilinogen mg/dL 0.0 0.0 - 2.0 mg/dL    Nitrite Urine Negative NEG^Negative    Leukocyte Esterase Urine Negative NEG^Negative    Source Midstream Urine     WBC Urine <1 0 - 5 /HPF    RBC Urine 2 0 - 2 /HPF    Squamous Epithelial /HPF Urine <1 0 - 1 /HPF   Protein  random urine with Creat Ratio    Collection Time: 06/13/19  2:00 PM   Result Value Ref Range    Protein Random Urine 0.16 g/L    Protein Total Urine g/gr Creatinine 0.11 0 - 0.2 g/g Cr   Creatinine urine calculation only    Collection Time: 06/13/19  2:00 PM   Result Value Ref Range    Creatinine Urine 148 mg/dL     CMP  Recent Labs   Lab Test 06/13/19  1354 04/05/19  1249 03/28/19  1456    143 142   POTASSIUM 4.1 4.0 4.0   CHLORIDE 109 106 109   CO2 26 28 27   ANIONGAP 5  --  5   GLC 91 105* 106*   BUN 29 27 31*   CR 1.28* 1.29 1.17   GFRESTIMATED 56* 55 62   GFRESTBLACK 65  --  72   JOHN 8.5 8.8 8.7   PROTTOTAL 6.4*  --  6.7*   ALBUMIN 3.9 4.4 3.9   BILITOTAL 0.3  --  0.3   ALKPHOS 63  --  65   AST 16  --  18   ALT 19  --  24      CBC  Recent Labs   Lab Test 06/13/19  1354 03/28/19  1456   HGB 13.3 13.7   WBC 6.3 6.4   RBC 4.05* 4.35*   HCT 38.9* 41.5   MCV 96 95   MCH 32.8 31.5   MCHC 34.2 33.0   RDW 12.7 12.5    218       Recent Labs   Lab Test 06/13/19  1400 03/28/19  1501   UTPG 0.11 0.10               ASSESSMENT AND PLAN:   Mr Yeager presents with concerns about recently finding that he has multiple cysts on both kidneys found incidentally when he had an MRI of the spine.  This was confirmed by US.    We reviewed together the results of the MRI, the estimated TKV, and the estimated anticipated rate of progression of CKD given the current data.  I explained that he would most likely not benefit from Tolvaptan.  He is also reassured by the neurosurgery evaluation, as well as gen surgery evaluation.  I commended him on his physical activity.  His BP is good.  I encouraged him to limit Na intake  I made no changes to his medications.   F/U in 6 months    Feroz Maria MD  Division of Renal Disease and Hypertension  June 13, 2019

## 2019-06-21 DIAGNOSIS — Q61.2 ADPKD (AUTOSOMAL DOMINANT POLYCYSTIC KIDNEY DISEASE): ICD-10-CM

## 2019-06-21 RX ORDER — AMLODIPINE BESYLATE 10 MG/1
10 TABLET ORAL DAILY
Qty: 90 TABLET | Refills: 3 | Status: SHIPPED | OUTPATIENT
Start: 2019-06-21 | End: 2020-04-06

## 2019-06-21 RX ORDER — LISINOPRIL 10 MG/1
10 TABLET ORAL DAILY
Qty: 90 TABLET | Refills: 3 | Status: SHIPPED | OUTPATIENT
Start: 2019-06-21 | End: 2019-12-19

## 2019-09-30 ENCOUNTER — HEALTH MAINTENANCE LETTER (OUTPATIENT)
Age: 74
End: 2019-09-30

## 2019-12-15 ENCOUNTER — HEALTH MAINTENANCE LETTER (OUTPATIENT)
Age: 74
End: 2019-12-15

## 2019-12-18 ENCOUNTER — TELEPHONE (OUTPATIENT)
Dept: NEPHROLOGY | Facility: CLINIC | Age: 74
End: 2019-12-18

## 2019-12-18 NOTE — TELEPHONE ENCOUNTER
Left message for pt reminding them of upcoming appointment.  Instructed pt to bring updated medications list.  faiza farrar

## 2019-12-19 ENCOUNTER — OFFICE VISIT (OUTPATIENT)
Dept: NEPHROLOGY | Facility: CLINIC | Age: 74
End: 2019-12-19
Attending: INTERNAL MEDICINE
Payer: MEDICARE

## 2019-12-19 VITALS
SYSTOLIC BLOOD PRESSURE: 175 MMHG | BODY MASS INDEX: 24.18 KG/M2 | HEART RATE: 52 BPM | DIASTOLIC BLOOD PRESSURE: 87 MMHG | OXYGEN SATURATION: 96 % | WEIGHT: 159 LBS

## 2019-12-19 DIAGNOSIS — Q61.2 ADPKD (AUTOSOMAL DOMINANT POLYCYSTIC KIDNEY DISEASE): Primary | ICD-10-CM

## 2019-12-19 DIAGNOSIS — Q61.2 ADPKD (AUTOSOMAL DOMINANT POLYCYSTIC KIDNEY DISEASE): ICD-10-CM

## 2019-12-19 LAB
ALBUMIN SERPL-MCNC: 4.2 G/DL (ref 3.4–5)
ALBUMIN UR-MCNC: NEGATIVE MG/DL
ANION GAP SERPL CALCULATED.3IONS-SCNC: 6 MMOL/L (ref 3–14)
APPEARANCE UR: CLEAR
BASOPHILS # BLD AUTO: 0 10E9/L (ref 0–0.2)
BASOPHILS NFR BLD AUTO: 0.7 %
BILIRUB UR QL STRIP: NEGATIVE
BUN SERPL-MCNC: 27 MG/DL (ref 7–30)
CALCIUM SERPL-MCNC: 8.8 MG/DL (ref 8.5–10.1)
CHLORIDE SERPL-SCNC: 109 MMOL/L (ref 94–109)
CO2 SERPL-SCNC: 27 MMOL/L (ref 20–32)
COLOR UR AUTO: YELLOW
CREAT SERPL-MCNC: 1.21 MG/DL (ref 0.66–1.25)
CREAT UR-MCNC: 166 MG/DL
DIFFERENTIAL METHOD BLD: NORMAL
EOSINOPHIL # BLD AUTO: 0.1 10E9/L (ref 0–0.7)
EOSINOPHIL NFR BLD AUTO: 1.6 %
ERYTHROCYTE [DISTWIDTH] IN BLOOD BY AUTOMATED COUNT: 12.5 % (ref 10–15)
GFR SERPL CREATININE-BSD FRML MDRD: 59 ML/MIN/{1.73_M2}
GLUCOSE SERPL-MCNC: 96 MG/DL (ref 70–99)
GLUCOSE UR STRIP-MCNC: NEGATIVE MG/DL
HCT VFR BLD AUTO: 43.2 % (ref 40–53)
HGB BLD-MCNC: 14.5 G/DL (ref 13.3–17.7)
HGB UR QL STRIP: NEGATIVE
IMM GRANULOCYTES # BLD: 0 10E9/L (ref 0–0.4)
IMM GRANULOCYTES NFR BLD: 0.3 %
KETONES UR STRIP-MCNC: NEGATIVE MG/DL
LEUKOCYTE ESTERASE UR QL STRIP: NEGATIVE
LYMPHOCYTES # BLD AUTO: 1.6 10E9/L (ref 0.8–5.3)
LYMPHOCYTES NFR BLD AUTO: 27.6 %
MCH RBC QN AUTO: 32.2 PG (ref 26.5–33)
MCHC RBC AUTO-ENTMCNC: 33.6 G/DL (ref 31.5–36.5)
MCV RBC AUTO: 96 FL (ref 78–100)
MONOCYTES # BLD AUTO: 0.6 10E9/L (ref 0–1.3)
MONOCYTES NFR BLD AUTO: 10.2 %
NEUTROPHILS # BLD AUTO: 3.5 10E9/L (ref 1.6–8.3)
NEUTROPHILS NFR BLD AUTO: 59.6 %
NITRATE UR QL: NEGATIVE
NRBC # BLD AUTO: 0 10*3/UL
NRBC BLD AUTO-RTO: 0 /100
PH UR STRIP: 5 PH (ref 5–7)
PHOSPHATE SERPL-MCNC: 2.8 MG/DL (ref 2.5–4.5)
PLATELET # BLD AUTO: 209 10E9/L (ref 150–450)
POTASSIUM SERPL-SCNC: 4 MMOL/L (ref 3.4–5.3)
PROT UR-MCNC: 0.19 G/L
PROT/CREAT 24H UR: 0.11 G/G CR (ref 0–0.2)
RBC # BLD AUTO: 4.51 10E12/L (ref 4.4–5.9)
SODIUM SERPL-SCNC: 141 MMOL/L (ref 133–144)
SOURCE: NORMAL
SP GR UR STRIP: 1.02 (ref 1–1.03)
UROBILINOGEN UR STRIP-MCNC: 0 MG/DL (ref 0–2)
WBC # BLD AUTO: 5.8 10E9/L (ref 4–11)

## 2019-12-19 PROCEDURE — 84156 ASSAY OF PROTEIN URINE: CPT | Performed by: INTERNAL MEDICINE

## 2019-12-19 PROCEDURE — 80069 RENAL FUNCTION PANEL: CPT | Performed by: INTERNAL MEDICINE

## 2019-12-19 PROCEDURE — 81003 URINALYSIS AUTO W/O SCOPE: CPT | Performed by: INTERNAL MEDICINE

## 2019-12-19 PROCEDURE — G0463 HOSPITAL OUTPT CLINIC VISIT: HCPCS | Mod: ZF

## 2019-12-19 PROCEDURE — 85025 COMPLETE CBC W/AUTO DIFF WBC: CPT | Performed by: INTERNAL MEDICINE

## 2019-12-19 PROCEDURE — 36415 COLL VENOUS BLD VENIPUNCTURE: CPT | Performed by: INTERNAL MEDICINE

## 2019-12-19 RX ORDER — LISINOPRIL 10 MG/1
20 TABLET ORAL DAILY
Qty: 180 TABLET | Refills: 3 | Status: SHIPPED | OUTPATIENT
Start: 2019-12-19 | End: 2020-10-29

## 2019-12-19 ASSESSMENT — PAIN SCALES - GENERAL: PAINLEVEL: NO PAIN (0)

## 2019-12-19 NOTE — PROGRESS NOTES
Nephrology Clinic    Tariq Yeager MRN:[id number] YOB: 1945  Date of Service: December 19, 2019  Primary care provider: No Ref-Primary, Physician  Requesting physician: Kristina Alvarado      REASON FOR CONSULT: Polycystic kidney disease    HISTORY OF PRESENT ILLNESS:   Tariq Yeager is a 73 year old male who presents for evaluation of multiple kidney cysts.    Mr Yeager has recently been informed about the presence of bilateral renal cysts which were incidentally identified by MRI of the spine he received in early February 2018 for back pain.  The presence of bilateral renal cysts was confirmed by ultrasound also obtained recently. (2/11/19 [facility])(see below).  He denies any history of gross hematuria, flank pain, urolithiasis, or UTIs.  He does however have a history of acute sudden incerebral hemorrhage in 2002 for which he had a prolonged ICU hospitalization at [facility] .  Note is made that he also underwent placement of a permanent IVC filter, although Mr Yeager does not recall having had a DVT.  Prior to that event, it does not appear that he was in good health.  He had an inguinal herniorrhaphy on the R and reports a recurrence of the hernia + new Inguinal hernia on the L.  No history of diverticulosis     June 13, 2019  Since the last visit, he had an MRI of Brain and ABd.  MRI of the brain revealed a small aneurysm for which I put in a referral to Dr STAN Robles.  Aneurysm was deemed very small and recommended repeat MRI in 2 year.  MRI of Abd revealed an estimated total kidney volume of 1627 ml, with class IB risk  category of progression of ADPKD, with an estimated projected eGFR ~ 24 ml/min in 10 years.    He is moderating his caffeine intake, salt intake.    Still swims 3 x a week.  Feels generally well.  No HA.     December 19, 2019  Since last visit, had a cologard test that came back positive. He is scheduled to have a colonoscopy in Jan 2020.  Otherwise  "feels \"very well physically\" \"totally boring\"  Swims 25 min twice a week, without CP or ESCOBEDO.    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  No L Ext swelling.  No gross hematuria or  symptoms.  Rare diarrhea.  No abd pain.  No HA.  Admits to forgetting his meds on occasion (estimates he takes his meds 5 days a week)  Drinks 6 cups of coffee a day.       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     Hypertension 2002     Inguinal hernia 2016     Presence of IVC filter 2002     PAST SURGICAL HISTORY:  Past Surgical History:   Procedure Laterality Date     cerebral aneurysm coil  2002     CRANIOTOMY, REPAIR ANEURYSM, COMBINED  2002     HERNIORRHAPHY INGUINAL Right 2016     MEDICATIONS:  Prescription Medications as of 12/20/2019       Rx Number Disp Refills Start End Last Dispensed Date Next Fill Date Owning Pharmacy    amLODIPine (NORVASC) 10 MG tablet  90 tablet 3 6/21/2019    [facility] [address] [place] [address]    Sig: Take 1 tablet (10 mg) by mouth daily    Class: E-Prescribe    Route: Oral    lisinopril (PRINIVIL/ZESTRIL) 10 MG tablet  180 tablet 3 12/19/2019    [facility] [address] [place] [address]    Sig: Take 2 tablets (20 mg) by mouth daily    Class: E-Prescribe    Route: Oral         ALLERGIES:    Allergies   Allergen Reactions     Dilantin [Phenytoin] Rash     REVIEW OF SYSTEMS:  A comprehensive review of systems was performed and found to be negative except as described here or above.     SOCIAL HISTORY:   Social History     Socioeconomic History     Marital status:      Spouse name: Not on file     Number of children: Not on file     Years of education: Not on file     Highest education level: Not on file   Occupational History     Not on file   Social Needs     Financial resource " "strain: Not on file     Food insecurity:     Worry: Not on file     Inability: Not on file     Transportation needs:     Medical: Not on file     Non-medical: Not on file   Tobacco Use     Smoking status: Never Smoker     Smokeless tobacco: Never Used   Substance and Sexual Activity     Alcohol use: No     Frequency: Never.  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  Remote history of use > 40 yrs ago     Sexual activity: Not on file   Lifestyle     Physical activity:     Days per week: Swims 3x/week     Minutes per session: 25 min     Stress: Not on file   Relationships   Other Topics Concern     Not on file   Social History Narrative     Not on file     FAMILY MEDICAL HISTORY:   Family History   Problem Relation Age of Onset     Kidney failure Mother      Cystic Kidney Disease Sister    Sister (70) was recently diagnosed with PKD by MRI, with multiple bilat cyst  Brother (77) with possible PKD (with few cysts on one side only)  Sister (75) does not want to be evaluated, but used to run marathons, and still runs half-marathons.    PHYSICAL EXAM:   BP (!) 175/87   Pulse 52   Wt 72.1 kg (159 lb)   SpO2 96%   BMI 24.18 kg/m       GENERAL APPEARANCE: alert and no distress  EYES: nonicteric  HENT: mouth without ulcers or lesions  NECK: supple, no adenopathy.  Carotids 2+ b, no bruits  RESP: lungs clear to auscultation   CV: regular rhythm, normal rate, no rub  ABDOMEN: soft, nontender, normal bowel sounds, no HSM . Kidneys not palpable today.   Extremities: no edema  MS: no evidence of inflammation in joints, no muscle tenderness  SKIN: no rash  NEURO: mentation intact and speech normal  PSYCH: affect normal/bright   LABS:   CMP  Recent Labs   Lab Test 12/19/19  1416 06/13/19  1354 04/05/19  1249 03/28/19  1456    140 143 142   POTASSIUM 4.0 4.1 4.0 4.0   CHLORIDE 109 109 106 109   CO2 27 26 28 27   ANIONGAP 6 5  --  5   GLC 96 91 105* 106*   BUN 27 29 27 31*   CR 1.21 1.28* 1.29 " 1.17   GFRESTIMATED 59* 56* 55 62   GFRESTBLACK 69 65  --  72   JOHN 8.8 8.5 8.8 8.7   PHOS 2.8  --   --   --    PROTTOTAL  --  6.4*  --  6.7*   ALBUMIN 4.2 3.9 4.4 3.9   BILITOTAL  --  0.3  --  0.3   ALKPHOS  --  63  --  65   AST  --  16  --  18   ALT  --  19  --  24     CBC  Recent Labs   Lab Test 12/19/19  1416 06/13/19  1354 03/28/19  1456   HGB 14.5 13.3 13.7   WBC 5.8 6.3 6.4   RBC 4.51 4.05* 4.35*   HCT 43.2 38.9* 41.5   MCV 96 96 95   MCH 32.2 32.8 31.5   MCHC 33.6 34.2 33.0   RDW 12.5 12.7 12.5    207 218       Recent Labs   Lab Test 12/19/19  1405 06/13/19  1400 03/28/19  1501   UTPG 0.11 0.11 0.10             ASSESSMENT AND PLAN:   Mr Yeager presents with concerns about recently finding that he has multiple cysts on both kidneys found incidentally when he had an MRI of the spine.  This was confirmed by US.    We reviewed together the results of the MRI, the estimated TKV, and the estimated anticipated rate of progression of CKD given the current data.  I explained that he would most likely not benefit from Tolvaptan.  He is also reassured by the neurosurgery evaluation, as well as gen surgery evaluation.    His BP is high.  I encouraged him to limit Na intake.  We discussed dietary changes.  I encouraged him to decrease his caffeine intake.  I increased the lisinopril to 20 mg daily.  Repeat chem panel after 10 days.    His renal function is stable.  He has no sig proteinuria.  F/U in 6 months    Feroz Maria MD  Division of Renal Disease and Hypertension  December 19, 2019

## 2019-12-19 NOTE — LETTER
12/19/2019      RE: Tariq Yeager  [address]  [facility] [address]       Nephrology Clinic    Tariq Yeager MRN:[id number] YOB: 1945  Date of Service: December 19, 2019  Primary care provider: Jo Ref-Primary, Physician  Requesting physician: Kristina Alvarado      REASON FOR CONSULT: Polycystic kidney disease    HISTORY OF PRESENT ILLNESS:   Tariq Yeager is a 73 year old male who presents for evaluation of multiple kidney cysts.    Mr Yeager has recently been informed about the presence of bilateral renal cysts which were incidentally identified by MRI of the spine he received in early February 2018 for back pain.  The presence of bilateral renal cysts was confirmed by ultrasound also obtained recently. (2/11/19 [facility])(see below).  He denies any history of gross hematuria, flank pain, urolithiasis, or UTIs.  He does however have a history of acute sudden incerebral hemorrhage in 2002 for which he had a prolonged ICU hospitalization at [facility] .  Note is made that he also underwent placement of a permanent IVC filter, although Mr Yeager does not recall having had a DVT.  Prior to that event, it does not appear that he was in good health.  He had an inguinal herniorrhaphy on the R and reports a recurrence of the hernia + new Inguinal hernia on the L.  No history of diverticulosis     June 13, 2019  Since the last visit, he had an MRI of Brain and ABd.  MRI of the brain revealed a small aneurysm for which I put in a referral to Dr STAN Robles.  Aneurysm was deemed very small and recommended repeat MRI in 2 year.  MRI of Abd revealed an estimated total kidney volume of 1627 ml, with class IB risk  category of progression of ADPKD, with an estimated projected eGFR ~ 24 ml/min in 10 years.    He is moderating his caffeine intake, salt intake.    Still swims 3 x a week.  Feels generally well.  No HA.     December 19, 2019  Since last visit, had a cologard test that came  "back positive. He is scheduled to have a colonoscopy in Jan 2020.  Otherwise feels \"very well physically\" \"totally boring\"  Swims 25 min twice a week, without CP or ESCOBEDO.    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  No L Ext swelling.  No gross hematuria or  symptoms.  Rare diarrhea.  No abd pain.  No HA.  Admits to forgetting his meds on occasion (estimates he takes his meds 5 days a week)  Drinks 6 cups of coffee a day.       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     Hypertension 2002     Inguinal hernia 2016     Presence of IVC filter 2002     PAST SURGICAL HISTORY:  Past Surgical History:   Procedure Laterality Date     cerebral aneurysm coil  2002     CRANIOTOMY, REPAIR ANEURYSM, COMBINED  2002     HERNIORRHAPHY INGUINAL Right 2016     MEDICATIONS:  Prescription Medications as of 12/20/2019       Rx Number Disp Refills Start End Last Dispensed Date Next Fill Date Owning Pharmacy    amLODIPine (NORVASC) 10 MG tablet  90 tablet 3 6/21/2019    [facility] [address] [place] [address]    Sig: Take 1 tablet (10 mg) by mouth daily    Class: E-Prescribe    Route: Oral    lisinopril (PRINIVIL/ZESTRIL) 10 MG tablet  180 tablet 3 12/19/2019    [facility] [address] [place] [address]    Sig: Take 2 tablets (20 mg) by mouth daily    Class: E-Prescribe    Route: Oral         ALLERGIES:    Allergies   Allergen Reactions     Dilantin [Phenytoin] Rash     REVIEW OF SYSTEMS:  A comprehensive review of systems was performed and found to be negative except as described here or above.     SOCIAL HISTORY:   Social History     Socioeconomic History     Marital status:      Spouse name: Not on file     Number of children: Not on file     Years of education: Not on file     Highest education level: Not on file " "  Occupational History     Not on file   Social Needs     Financial resource strain: Not on file     Food insecurity:     Worry: Not on file     Inability: Not on file     Transportation needs:     Medical: Not on file     Non-medical: Not on file   Tobacco Use     Smoking status: Never Smoker     Smokeless tobacco: Never Used   Substance and Sexual Activity     Alcohol use: No     Frequency: Never.  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  Remote history of use > 40 yrs ago     Sexual activity: Not on file   Lifestyle     Physical activity:     Days per week: Swims 3x/week     Minutes per session: 25 min     Stress: Not on file   Relationships   Other Topics Concern     Not on file   Social History Narrative     Not on file     FAMILY MEDICAL HISTORY:   Family History   Problem Relation Age of Onset     Kidney failure Mother      Cystic Kidney Disease Sister    Sister (70) was recently diagnosed with PKD by MRI, with multiple bilat cyst  Brother (77) with possible PKD (with few cysts on one side only)  Sister (75) does not want to be evaluated, but used to run marathons, and still runs half-marathons.    PHYSICAL EXAM:   BP (!) 175/87   Pulse 52   Wt 72.1 kg (159 lb)   SpO2 96%   BMI 24.18 kg/m        GENERAL APPEARANCE: alert and no distress  EYES: nonicteric  HENT: mouth without ulcers or lesions  NECK: supple, no adenopathy.  Carotids 2+ b, no bruits  RESP: lungs clear to auscultation   CV: regular rhythm, normal rate, no rub  ABDOMEN: soft, nontender, normal bowel sounds, no HSM . Kidneys not palpable today.   Extremities: no edema  MS: no evidence of inflammation in joints, no muscle tenderness  SKIN: no rash  NEURO: mentation intact and speech normal  PSYCH: affect normal/bright   LABS:   CMP  Recent Labs   Lab Test 12/19/19  1416 06/13/19  1354 04/05/19  1249 03/28/19  1456    140 143 142   POTASSIUM 4.0 4.1 4.0 4.0   CHLORIDE 109 109 106 109   CO2 27 26 28 27 "   ANIONGAP 6 5  --  5   GLC 96 91 105* 106*   BUN 27 29 27 31*   CR 1.21 1.28* 1.29 1.17   GFRESTIMATED 59* 56* 55 62   GFRESTBLACK 69 65  --  72   JOHN 8.8 8.5 8.8 8.7   PHOS 2.8  --   --   --    PROTTOTAL  --  6.4*  --  6.7*   ALBUMIN 4.2 3.9 4.4 3.9   BILITOTAL  --  0.3  --  0.3   ALKPHOS  --  63  --  65   AST  --  16  --  18   ALT  --  19  --  24     CBC  Recent Labs   Lab Test 12/19/19  1416 06/13/19  1354 03/28/19  1456   HGB 14.5 13.3 13.7   WBC 5.8 6.3 6.4   RBC 4.51 4.05* 4.35*   HCT 43.2 38.9* 41.5   MCV 96 96 95   MCH 32.2 32.8 31.5   MCHC 33.6 34.2 33.0   RDW 12.5 12.7 12.5    207 218       Recent Labs   Lab Test 12/19/19  1405 06/13/19  1400 03/28/19  1501   UTPG 0.11 0.11 0.10             ASSESSMENT AND PLAN:   Mr Yeager presents with concerns about recently finding that he has multiple cysts on both kidneys found incidentally when he had an MRI of the spine.  This was confirmed by US.    We reviewed together the results of the MRI, the estimated TKV, and the estimated anticipated rate of progression of CKD given the current data.  I explained that he would most likely not benefit from Tolvaptan.  He is also reassured by the neurosurgery evaluation, as well as gen surgery evaluation.    His BP is high.  I encouraged him to limit Na intake.  We discussed dietary changes.  I encouraged him to decrease his caffeine intake.  I increased the lisinopril to 20 mg daily.  Repeat chem panel after 10 days.    His renal function is stable.  He has no sig proteinuria.  F/U in 6 months    Feroz Maria MD  Division of Renal Disease and Hypertension  December 19, 2019        Feroz Maria MD

## 2019-12-19 NOTE — LETTER
12/19/2019       RE: Tariq Yeager  [address]  [place] [address]     Dear Colleague,    Thank you for referring your patient, Tariq Yeager, to the [facility] at [facility]. Please see a copy of my visit note below.    Nephrology Clinic    Tariq Yeager MRN:[id number] YOB: 1945  Date of Service: December 19, 2019  Primary care provider: No Ref-Primary, Physician  Requesting physician: Kristina Alvarado      REASON FOR CONSULT: Polycystic kidney disease    HISTORY OF PRESENT ILLNESS:   Tariq Yeager is a 73 year old male who presents for evaluation of multiple kidney cysts.    Mr Yeager has recently been informed about the presence of bilateral renal cysts which were incidentally identified by MRI of the spine he received in early February 2018 for back pain.  The presence of bilateral renal cysts was confirmed by ultrasound also obtained recently. (2/11/19 [facility])(see below).  He denies any history of gross hematuria, flank pain, urolithiasis, or UTIs.  He does however have a history of acute sudden incerebral hemorrhage in 2002 for which he had a prolonged ICU hospitalization at [facility] .  Note is made that he also underwent placement of a permanent IVC filter, although Mr Yeager does not recall having had a DVT.  Prior to that event, it does not appear that he was in good health.  He had an inguinal herniorrhaphy on the R and reports a recurrence of the hernia + new Inguinal hernia on the L.  No history of diverticulosis     June 13, 2019  Since the last visit, he had an MRI of Brain and ABd.  MRI of the brain revealed a small aneurysm for which I put in a referral to Dr STAN Robles.  Aneurysm was deemed very small and recommended repeat MRI in 2 year.  MRI of Abd revealed an estimated total kidney volume of 1627 ml, with class IB risk  category of progression of ADPKD, with an estimated projected eGFR ~ 24  "ml/min in 10 years.    He is moderating his caffeine intake, salt intake.    Still swims 3 x a week.  Feels generally well.  No HA.     December 19, 2019  Since last visit, had a cologard test that came back positive. He is scheduled to have a colonoscopy in Jan 2020.  Otherwise feels \"very well physically\" \"totally boring\"  Swims 25 min twice a week, without CP or ESCOBEDO.    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  No L Ext swelling.  No gross hematuria or  symptoms.  Rare diarrhea.  No abd pain.  No HA.  Admits to forgetting his meds on occasion (estimates he takes his meds 5 days a week)  Drinks 6 cups of coffee a day.       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     Hypertension 2002     Inguinal hernia 2016     Presence of IVC filter 2002     PAST SURGICAL HISTORY:  Past Surgical History:   Procedure Laterality Date     cerebral aneurysm coil  2002     CRANIOTOMY, REPAIR ANEURYSM, COMBINED  2002     HERNIORRHAPHY INGUINAL Right 2016     MEDICATIONS:  Prescription Medications as of 12/20/2019       Rx Number Disp Refills Start End Last Dispensed Date Next Fill Date Owning Pharmacy    amLODIPine (NORVASC) 10 MG tablet  90 tablet 3 6/21/2019    [facility] [address] [place] [address]    Sig: Take 1 tablet (10 mg) by mouth daily    Class: E-Prescribe    Route: Oral    lisinopril (PRINIVIL/ZESTRIL) 10 MG tablet  180 tablet 3 12/19/2019    [facility] [address] [place] [address]    Sig: Take 2 tablets (20 mg) by mouth daily    Class: E-Prescribe    Route: Oral         ALLERGIES:    Allergies   Allergen Reactions     Dilantin [Phenytoin] Rash     REVIEW OF SYSTEMS:  A comprehensive review of systems was performed and found to be negative except as described here or above.     SOCIAL HISTORY:   Social History " "    Socioeconomic History     Marital status:      Spouse name: Not on file     Number of children: Not on file     Years of education: Not on file     Highest education level: Not on file   Occupational History     Not on file   Social Needs     Financial resource strain: Not on file     Food insecurity:     Worry: Not on file     Inability: Not on file     Transportation needs:     Medical: Not on file     Non-medical: Not on file   Tobacco Use     Smoking status: Never Smoker     Smokeless tobacco: Never Used   Substance and Sexual Activity     Alcohol use: No     Frequency: Never.  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  Remote history of use > 40 yrs ago     Sexual activity: Not on file   Lifestyle     Physical activity:     Days per week: Swims 3x/week     Minutes per session: 25 min     Stress: Not on file   Relationships   Other Topics Concern     Not on file   Social History Narrative     Not on file     FAMILY MEDICAL HISTORY:   Family History   Problem Relation Age of Onset     Kidney failure Mother      Cystic Kidney Disease Sister    Sister (70) was recently diagnosed with PKD by MRI, with multiple bilat cyst  Brother (77) with possible PKD (with few cysts on one side only)  Sister (75) does not want to be evaluated, but used to run marathons, and still runs half-marathons.    PHYSICAL EXAM:   BP (!) 175/87   Pulse 52   Wt 72.1 kg (159 lb)   SpO2 96%   BMI 24.18 kg/m        GENERAL APPEARANCE: alert and no distress  EYES: nonicteric  HENT: mouth without ulcers or lesions  NECK: supple, no adenopathy.  Carotids 2+ b, no bruits  RESP: lungs clear to auscultation   CV: regular rhythm, normal rate, no rub  ABDOMEN: soft, nontender, normal bowel sounds, no HSM . Kidneys not palpable today.   Extremities: no edema  MS: no evidence of inflammation in joints, no muscle tenderness  SKIN: no rash  NEURO: mentation intact and speech normal  PSYCH: affect normal/bright "   LABS:   CMP  Recent Labs   Lab Test 12/19/19  1416 06/13/19  1354 04/05/19  1249 03/28/19  1456    140 143 142   POTASSIUM 4.0 4.1 4.0 4.0   CHLORIDE 109 109 106 109   CO2 27 26 28 27   ANIONGAP 6 5  --  5   GLC 96 91 105* 106*   BUN 27 29 27 31*   CR 1.21 1.28* 1.29 1.17   GFRESTIMATED 59* 56* 55 62   GFRESTBLACK 69 65  --  72   JOHN 8.8 8.5 8.8 8.7   PHOS 2.8  --   --   --    PROTTOTAL  --  6.4*  --  6.7*   ALBUMIN 4.2 3.9 4.4 3.9   BILITOTAL  --  0.3  --  0.3   ALKPHOS  --  63  --  65   AST  --  16  --  18   ALT  --  19  --  24     CBC  Recent Labs   Lab Test 12/19/19  1416 06/13/19  1354 03/28/19  1456   HGB 14.5 13.3 13.7   WBC 5.8 6.3 6.4   RBC 4.51 4.05* 4.35*   HCT 43.2 38.9* 41.5   MCV 96 96 95   MCH 32.2 32.8 31.5   MCHC 33.6 34.2 33.0   RDW 12.5 12.7 12.5    207 218       Recent Labs   Lab Test 12/19/19  1405 06/13/19  1400 03/28/19  1501   UTPG 0.11 0.11 0.10             ASSESSMENT AND PLAN:   Mr Yeager presents with concerns about recently finding that he has multiple cysts on both kidneys found incidentally when he had an MRI of the spine.  This was confirmed by US.    We reviewed together the results of the MRI, the estimated TKV, and the estimated anticipated rate of progression of CKD given the current data.  I explained that he would most likely not benefit from Tolvaptan.  He is also reassured by the neurosurgery evaluation, as well as gen surgery evaluation.    His BP is high.  I encouraged him to limit Na intake.  We discussed dietary changes.  I encouraged him to decrease his caffeine intake.  I increased the lisinopril to 20 mg daily.  Repeat chem panel after 10 days.    His renal function is stable.  He has no sig proteinuria.  F/U in 6 months    Feroz Maria MD  Division of Renal Disease and Hypertension  December 19, 2019        Again, thank you for allowing me to participate in the care of your patient.      Sincerely,    Feroz Maria MD

## 2019-12-19 NOTE — PATIENT INSTRUCTIONS
Please increase lisinopril to 20 mg (2 tablets) daily.  You will need repeat blood work after 10-15 days after increasing the dose.  Please limit your Sodium intake to 2000 mg a day  Please decrease the caffeine intake to no more than 3 cups a day (sorry)

## 2020-04-06 DIAGNOSIS — Q61.2 ADPKD (AUTOSOMAL DOMINANT POLYCYSTIC KIDNEY DISEASE): ICD-10-CM

## 2020-04-06 RX ORDER — AMLODIPINE BESYLATE 10 MG/1
10 TABLET ORAL DAILY
Qty: 90 TABLET | Refills: 3 | Status: SHIPPED | OUTPATIENT
Start: 2020-04-06 | End: 2020-10-29

## 2020-07-03 DIAGNOSIS — Q61.2 ADPKD (AUTOSOMAL DOMINANT POLYCYSTIC KIDNEY DISEASE): Primary | ICD-10-CM

## 2020-07-30 ENCOUNTER — HOSPITAL ENCOUNTER (OUTPATIENT)
Dept: CARDIOLOGY | Facility: CLINIC | Age: 75
Discharge: HOME | End: 2020-07-30
Attending: INTERNAL MEDICINE | Admitting: INTERNAL MEDICINE
Payer: MEDICARE

## 2020-07-30 ENCOUNTER — OFFICE VISIT (OUTPATIENT)
Dept: NEPHROLOGY | Facility: CLINIC | Age: 75
End: 2020-07-30
Attending: INTERNAL MEDICINE
Payer: MEDICARE

## 2020-07-30 VITALS
DIASTOLIC BLOOD PRESSURE: 88 MMHG | SYSTOLIC BLOOD PRESSURE: 170 MMHG | WEIGHT: 158.7 LBS | TEMPERATURE: 98 F | HEART RATE: 49 BPM | BODY MASS INDEX: 24.05 KG/M2 | HEIGHT: 68 IN | OXYGEN SATURATION: 98 %

## 2020-07-30 DIAGNOSIS — N18.30 CKD (CHRONIC KIDNEY DISEASE) STAGE 3, GFR 30-59 ML/MIN (H): ICD-10-CM

## 2020-07-30 DIAGNOSIS — Q61.3 PKD (POLYCYSTIC KIDNEY DISEASE): ICD-10-CM

## 2020-07-30 DIAGNOSIS — I10 ELEVATED BLOOD PRESSURE READING IN OFFICE WITH WHITE COAT SYNDROME, WITH DIAGNOSIS OF HYPERTENSION: ICD-10-CM

## 2020-07-30 DIAGNOSIS — Q61.2 ADPKD (AUTOSOMAL DOMINANT POLYCYSTIC KIDNEY DISEASE): ICD-10-CM

## 2020-07-30 DIAGNOSIS — R03.0 ELEVATED BLOOD-PRESSURE READING, WITHOUT DIAGNOSIS OF HYPERTENSION: ICD-10-CM

## 2020-07-30 DIAGNOSIS — I67.1 CEREBRAL ANEURYSM, NONRUPTURED: ICD-10-CM

## 2020-07-30 DIAGNOSIS — I10 ELEVATED BLOOD PRESSURE READING IN OFFICE WITH WHITE COAT SYNDROME, WITH DIAGNOSIS OF HYPERTENSION: Primary | ICD-10-CM

## 2020-07-30 DIAGNOSIS — D64.9 ANEMIA, UNSPECIFIED TYPE: ICD-10-CM

## 2020-07-30 LAB
ALBUMIN SERPL-MCNC: 3.9 G/DL (ref 3.4–5)
ALBUMIN UR-MCNC: NEGATIVE MG/DL
ANION GAP SERPL CALCULATED.3IONS-SCNC: 5 MMOL/L (ref 3–14)
APPEARANCE UR: CLEAR
BILIRUB UR QL STRIP: NEGATIVE
BUN SERPL-MCNC: 27 MG/DL (ref 7–30)
CALCIUM SERPL-MCNC: 8.2 MG/DL (ref 8.5–10.1)
CHLORIDE SERPL-SCNC: 106 MMOL/L (ref 94–109)
CO2 SERPL-SCNC: 27 MMOL/L (ref 20–32)
COLOR UR AUTO: YELLOW
CREAT SERPL-MCNC: 1.26 MG/DL (ref 0.66–1.25)
CREAT UR-MCNC: 111 MG/DL
ERYTHROCYTE [DISTWIDTH] IN BLOOD BY AUTOMATED COUNT: 12.5 % (ref 10–15)
FERRITIN SERPL-MCNC: 48 NG/ML (ref 26–388)
GFR SERPL CREATININE-BSD FRML MDRD: 56 ML/MIN/{1.73_M2}
GLUCOSE SERPL-MCNC: 102 MG/DL (ref 70–99)
GLUCOSE UR STRIP-MCNC: NEGATIVE MG/DL
HCT VFR BLD AUTO: 38.1 % (ref 40–53)
HGB BLD-MCNC: 13 G/DL (ref 13.3–17.7)
HGB UR QL STRIP: NEGATIVE
IRON SATN MFR SERPL: 34 % (ref 15–46)
IRON SERPL-MCNC: 102 UG/DL (ref 35–180)
KETONES UR STRIP-MCNC: NEGATIVE MG/DL
LEUKOCYTE ESTERASE UR QL STRIP: NEGATIVE
MCH RBC QN AUTO: 32.7 PG (ref 26.5–33)
MCHC RBC AUTO-ENTMCNC: 34.1 G/DL (ref 31.5–36.5)
MCV RBC AUTO: 96 FL (ref 78–100)
MUCOUS THREADS #/AREA URNS LPF: PRESENT /LPF
NITRATE UR QL: NEGATIVE
PH UR STRIP: 5 PH (ref 5–7)
PHOSPHATE SERPL-MCNC: 3 MG/DL (ref 2.5–4.5)
PLATELET # BLD AUTO: 184 10E9/L (ref 150–450)
POTASSIUM SERPL-SCNC: 3.8 MMOL/L (ref 3.4–5.3)
PROT UR-MCNC: 0.13 G/L
PROT/CREAT 24H UR: 0.12 G/G CR (ref 0–0.2)
RBC # BLD AUTO: 3.98 10E12/L (ref 4.4–5.9)
RBC #/AREA URNS AUTO: <1 /HPF (ref 0–2)
SODIUM SERPL-SCNC: 138 MMOL/L (ref 133–144)
SOURCE: ABNORMAL
SP GR UR STRIP: 1.01 (ref 1–1.03)
SQUAMOUS #/AREA URNS AUTO: <1 /HPF (ref 0–1)
TIBC SERPL-MCNC: 300 UG/DL (ref 240–430)
UROBILINOGEN UR STRIP-MCNC: 0 MG/DL (ref 0–2)
WBC # BLD AUTO: 5.3 10E9/L (ref 4–11)
WBC #/AREA URNS AUTO: 1 /HPF (ref 0–5)

## 2020-07-30 PROCEDURE — 83550 IRON BINDING TEST: CPT | Performed by: INTERNAL MEDICINE

## 2020-07-30 PROCEDURE — 81001 URINALYSIS AUTO W/SCOPE: CPT | Performed by: INTERNAL MEDICINE

## 2020-07-30 PROCEDURE — G0463 HOSPITAL OUTPT CLINIC VISIT: HCPCS | Mod: ZF

## 2020-07-30 PROCEDURE — 36415 COLL VENOUS BLD VENIPUNCTURE: CPT | Performed by: INTERNAL MEDICINE

## 2020-07-30 PROCEDURE — 83540 ASSAY OF IRON: CPT | Performed by: INTERNAL MEDICINE

## 2020-07-30 PROCEDURE — 93788 AMBL BP MNTR W/SW A/R: CPT

## 2020-07-30 PROCEDURE — 82728 ASSAY OF FERRITIN: CPT | Performed by: INTERNAL MEDICINE

## 2020-07-30 PROCEDURE — 85027 COMPLETE CBC AUTOMATED: CPT | Performed by: INTERNAL MEDICINE

## 2020-07-30 PROCEDURE — 93790 AMBL BP MNTR W/SW I&R: CPT | Performed by: INTERNAL MEDICINE

## 2020-07-30 PROCEDURE — 80069 RENAL FUNCTION PANEL: CPT | Performed by: INTERNAL MEDICINE

## 2020-07-30 PROCEDURE — 82306 VITAMIN D 25 HYDROXY: CPT | Performed by: INTERNAL MEDICINE

## 2020-07-30 PROCEDURE — 84156 ASSAY OF PROTEIN URINE: CPT | Performed by: INTERNAL MEDICINE

## 2020-07-30 ASSESSMENT — PAIN SCALES - GENERAL: PAINLEVEL: NO PAIN (0)

## 2020-07-30 ASSESSMENT — MIFFLIN-ST. JEOR: SCORE: 1444.36

## 2020-07-30 NOTE — LETTER
7/30/2020       RE: Tariq Yeager  [address]  [place] [address]     Dear Colleague,    Thank you for referring your patient, Tariq Yeager, to the [facility] NEPHROLOGY at [facility]. Please see a copy of my visit note below.    Nephrology Clinic    Tariq Yeager MRN:[id number] YOB: 1945  Date of Service: July 30, 2020      REASON FOR FOLLOW UP: Polycystic kidney disease    HISTORY OF PRESENT ILLNESS:  Tariq Yeager is a 73 year old male who presented initially for multiple kidney cysts, consistent with PKD.   Mr Yeager has recently been informed about the presence of bilateral renal cysts which were incidentally identified by MRI of the spine he received in early February 2018 for back pain.  The presence of bilateral renal cysts was confirmed by ultrasound also obtained recently. (2/11/19 [facility])(see below).  He denies any history of gross hematuria, flank pain, urolithiasis, or UTIs.  He does however have a history of acute sudden incerebral hemorrhage in 2002 for which he had a prolonged ICU hospitalization at [facility] .  Note is made that he also underwent placement of a permanent IVC filter, although Mr Yeager does not recall having had a DVT.  Prior to that event, it does not appear that he was in good health.  He had an inguinal herniorrhaphy on the R and reports a recurrence of the hernia + new Inguinal hernia on the L. No history of diverticulosis     June 13, 2019  Since the last visit, he had an MRI of Brain and Abd.  MRI of the brain revealed a small aneurysm for which I put in a referral to Dr STAN Robles.  Aneurysm was deemed very small and recommended repeat MRI in 2 year.  MRI of Abd revealed an estimated total kidney volume of 1627 ml, with class IB risk  category of progression of ADPKD, with an estimated projected eGFR ~ 24 ml/min in 10 years.  He is moderating his caffeine intake, salt intake.   "  Still swims 3 x a week.  Feels generally well.  No HA.     December 19, 2019  Since last visit, had a cologard test that came back positive. He is scheduled to have a colonoscopy in Jan 2020.  Otherwise feels \"very well physically\" \"totally boring\"  Swims 25 min twice a week, without CP or ESCOBEDO.    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  No L Ext swelling.  No gross hematuria or  symptoms.  Rare diarrhea.  No abd pain.  No HA.  Admits to forgetting his meds on occasion (estimates he takes his meds 5 days a week)  Drinks 6 cups of coffee a day.      July 30, 2020  Since last visit, he notes doing well. No hospitalizations or significant illnesses. No problems with hematuria or dysuria.  No abd pain. Continues to swim regularly, 3-4x per week.   He continues to have chronic headaches, almost nightly, for many years. No new symptoms or change in character. He does snore quite loudly per the wife, but no witnessed apnea. Does not take Tylenol or Advil.  In terms of BP, home BP is running 130-140/80-90. Remains on amlodipine and lisinopril, good adherence.  Of note, he did have a fall while fixing some gutters, and did briefly lose consciousness. Feeling fine now without issue. Did not seek medical attention.      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     Hypertension 2002     Inguinal hernia 2016     Presence of IVC filter 2002     PAST SURGICAL HISTORY:  Past Surgical History:   Procedure Laterality Date     cerebral aneurysm coil  2002     CRANIOTOMY, REPAIR ANEURYSM, COMBINED  2002     HERNIORRHAPHY INGUINAL Right 2016     MEDICATIONS:  Prescription Medications as of 7/30/2020       Rx Number Disp Refills Start End Last Dispensed Date Next Fill Date Owning Pharmacy    amLODIPine (NORVASC) 10 MG tablet  90 tablet 3 4/6/2020    [facility][id number] [place] [address] AT [facility] [address]    Sig: Take 1 tablet (10 mg) by mouth daily    Class: E-Prescribe    Route: Oral    lisinopril (PRINIVIL/ZESTRIL) 10 MG tablet  180 tablet 3 12/19/2019    [id number] [address] [facility]    Sig: Take 2 tablets (20 mg) by mouth daily    Class: E-Prescribe    Route: Oral         ALLERGIES:    Allergies   Allergen Reactions     Dilantin [Phenytoin] Rash     REVIEW OF SYSTEMS:  A comprehensive review of systems was performed and found to be negative except as described here or above.     SOCIAL HISTORY:   Social History     Socioeconomic History     Marital status:      Spouse name: Not on file     Number of children: Not on file     Years of education: Not on file     Highest education level: Not on file   Occupational History     Not on file   Social Needs     Financial resource strain: Not on file     Food insecurity:     Worry: Not on file     Inability: Not on file     Transportation needs:     Medical: Not on file     Non-medical: Not on file   Tobacco Use     Smoking status: Never Smoker     Smokeless tobacco: Never Used   Substance and Sexual Activity     Alcohol use: No     Frequency: Never.  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  Remote history of use > 40 yrs ago     Sexual activity: Not on file   Lifestyle     Physical activity:     Days per week: Swims 3x/week     Minutes per session: 25 min     Stress: Not on file   Relationships   Other Topics Concern     Not on file   Social History Narrative     Not on file     FAMILY MEDICAL HISTORY:   Family History   Problem Relation Age of Onset     Kidney failure Mother      Cystic Kidney Disease Sister    Sister (70) was recently diagnosed with PKD by MRI, with multiple bilat cyst  Brother (77) with possible PKD (with few cysts on one side only)  Sister (75) does not want to be evaluated, but used to run marathons, " "and still runs half-marathons.    PHYSICAL EXAM:   BP (!) 170/88   Pulse (!) 49   Temp 98  F (36.7  C) (Oral)   Ht 1.727 m (5' 8\")   Wt 72 kg (158 lb 11.2 oz)   SpO2 98%   BMI 24.13 kg/m       GENERAL APPEARANCE: alert and no distress  EYES: nonicteric  NECK: supple, no adenopathy.   RESP: lungs clear to auscultation   CV: regular rhythm, normal rate, no rub  ABDOMEN: soft, nontender, normal bowel sounds, no HSM . Kidneys not palpable today.   Extremities: no edema  MS: no evidence of inflammation in joints, no muscle tenderness  SKIN: no rash  NEURO: mentation intact and speech normal  PSYCH: affect normal/bright     LABS:   CMP  Recent Labs   Lab Test 07/30/20  1328 12/19/19  1416 06/13/19  1354 04/05/19  1249 03/28/19  1456    141 140 143 142   POTASSIUM 3.8 4.0 4.1 4.0 4.0   CHLORIDE 106 109 109 106 109   CO2 27 27 26 28 27   ANIONGAP 5 6 5  --  5   * 96 91 105* 106*   BUN 27 27 29 27 31*   CR 1.26* 1.21 1.28* 1.29 1.17   GFRESTIMATED 56* 59* 56* 55 62   GFRESTBLACK 65 69 65  --  72   JOHN 8.2* 8.8 8.5 8.8 8.7   PHOS 3.0 2.8  --   --   --    PROTTOTAL  --   --  6.4*  --  6.7*   ALBUMIN 3.9 4.2 3.9 4.4 3.9   BILITOTAL  --   --  0.3  --  0.3   ALKPHOS  --   --  63  --  65   AST  --   --  16  --  18   ALT  --   --  19  --  24     CBC  Recent Labs   Lab Test 07/30/20  1328 12/19/19  1416 06/13/19  1354 03/28/19  1456   HGB 13.0* 14.5 13.3 13.7   WBC 5.3 5.8 6.3 6.4   RBC 3.98* 4.51 4.05* 4.35*   HCT 38.1* 43.2 38.9* 41.5   MCV 96 96 96 95   MCH 32.7 32.2 32.8 31.5   MCHC 34.1 33.6 34.2 33.0   RDW 12.5 12.5 12.7 12.5    209 207 218       Recent Labs   Lab Test 07/30/20  1331 12/19/19  1405 06/13/19  1400 03/28/19  1501   UTPG 0.12 0.11 0.11 0.10             ASSESSMENT AND PLAN:   Mr Yeager presents with PKD. Previously, we reviewed together the results of the MRI, the estimated TKV, and the estimated anticipated rate of progression of CKD given the current data. It was explained that he " "would most likely not benefit from Tolvaptan.    Since last visit 6 months ago, his renal function is stable and he has no sig proteinuria. Will continue to optimize CKD Stage 3 cares with BP control and BMD/Anemia management.    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 He does have BP discrepancy that is quite large today, with sBP 150-170 here in clinic (home 130/80), Will plan 24-Hr ABPM. Will not make changes to amlodipine or lisinopril at this time.     Mild anemia compared to previous, Iron Sat is normal. Calcium mildly low today, will check Vit D level. Phos and Albumin normal.    In terms of his cerebral aneurysm, plan for repeat MRI next year per Neurosurgery. In the interim, we counseled him on the importance of seeking medical attention when suffering head trauma/falls (much like the incident with his gutter). He expressed understanding.    Pt staffed with Dr. Maria.     F/U in 6 months    YUDELKA Robin MD  Neph Fellow  [phone]    ATTENDING ATTESTATION  This patient has been seen and evaluated by me, Feroz Maria MD on July 30, 2020 .  Discussed with Dr Robin and agree with the findings and plan in this note.  I have reviewed  Medications, Vital Signs and Labs.  BP (!) 170/88   Pulse (!) 49   Temp 98  F (36.7  C) (Oral)   Ht 1.727 m (5' 8\")   Wt 72 kg (158 lb 11.2 oz)   SpO2 98%   BMI 24.13 kg/m    No direct inguinal hernia appreciated on physical examination.  No Abd tenderness.  Per the patient's request, we will await results of Ambulatory BP monitoring, and adjust dose of lisinopril accordingly.  Patient commended and encouraged to continue swimming.  Will follow up in 3 months to review BP and adjust antihypertensive meds as needed.     Feroz Maria MD  [facility]  Department of Medicine  Division of Renal Disease and Hypertension  Pager               "

## 2020-07-30 NOTE — NURSING NOTE
"Chief Complaint   Patient presents with     RECHECK     ADPKD     BP (!) 171/81   Pulse (!) 49   Temp 98  F (36.7  C) (Oral)   Ht 1.727 m (5' 8\")   Wt 72 kg (158 lb 11.2 oz)   SpO2 98%   BMI 24.13 kg/m    Kayleigh Reardon [facility]  7/30/2020 2:22 PM    "

## 2020-07-30 NOTE — PROGRESS NOTES
"Nephrology Clinic    Tariq Yeager MRN:[id number] YOB: 1945  Date of Service: July 30, 2020      REASON FOR FOLLOW UP: Polycystic kidney disease    HISTORY OF PRESENT ILLNESS:  Tariq Yeager is a 73 year old male who presented initially for multiple kidney cysts, consistent with PKD.   Mr Yeager has recently been informed about the presence of bilateral renal cysts which were incidentally identified by MRI of the spine he received in early February 2018 for back pain.  The presence of bilateral renal cysts was confirmed by ultrasound also obtained recently. (2/11/19 [facility])(see below).  He denies any history of gross hematuria, flank pain, urolithiasis, or UTIs.  He does however have a history of acute sudden incerebral hemorrhage in 2002 for which he had a prolonged ICU hospitalization at [facility] .  Note is made that he also underwent placement of a permanent IVC filter, although Mr Yeager does not recall having had a DVT.  Prior to that event, it does not appear that he was in good health.  He had an inguinal herniorrhaphy on the R and reports a recurrence of the hernia + new Inguinal hernia on the L. No history of diverticulosis     June 13, 2019  Since the last visit, he had an MRI of Brain and Abd.  MRI of the brain revealed a small aneurysm for which I put in a referral to Dr STAN Robles.  Aneurysm was deemed very small and recommended repeat MRI in 2 year.  MRI of Abd revealed an estimated total kidney volume of 1627 ml, with class IB risk  category of progression of ADPKD, with an estimated projected eGFR ~ 24 ml/min in 10 years.  He is moderating his caffeine intake, salt intake.    Still swims 3 x a week.  Feels generally well.  No HA.     December 19, 2019  Since last visit, had a cologard test that came back positive. He is scheduled to have a colonoscopy in Jan 2020.  Otherwise feels \"very well physically\" \"totally boring\"  Swims 25 min twice a week, " without CP or ESCOBEDO.    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  No L Ext swelling.  No gross hematuria or  symptoms.  Rare diarrhea.  No abd pain.  No HA.  Admits to forgetting his meds on occasion (estimates he takes his meds 5 days a week)  Drinks 6 cups of coffee a day.      July 30, 2020  Since last visit, he notes doing well. No hospitalizations or significant illnesses. No problems with hematuria or dysuria.  No abd pain. Continues to swim regularly, 3-4x per week.   He continues to have chronic headaches, almost nightly, for many years. No new symptoms or change in character. He does snore quite loudly per the wife, but no witnessed apnea. Does not take Tylenol or Advil.  In terms of BP, home BP is running 130-140/80-90. Remains on amlodipine and lisinopril, good adherence.  Of note, he did have a fall while fixing some gutters, and did briefly lose consciousness. Feeling fine now without issue. Did not seek medical attention.      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     Hypertension 2002     Inguinal hernia 2016     Presence of IVC filter 2002     PAST SURGICAL HISTORY:  Past Surgical History:   Procedure Laterality Date     cerebral aneurysm coil  2002     CRANIOTOMY, REPAIR ANEURYSM, COMBINED  2002     HERNIORRHAPHY INGUINAL Right 2016     MEDICATIONS:  Prescription Medications as of 7/30/2020       Rx Number Disp Refills Start End Last Dispensed Date Next Fill Date Owning Pharmacy    amLODIPine (NORVASC) 10 MG tablet  90 tablet 3 4/6/2020    [facility] #[id number] - [place] - [address] AT [facility] [address]    Sig: Take 1 tablet (10 mg) by mouth daily    Class: E-Prescribe    Route: Oral    lisinopril (PRINIVIL/ZESTRIL) 10 MG tablet  180 tablet 3 12/19/2019    [facility] #[id number] - [place], [place] -  "[address]    Sig: Take 2 tablets (20 mg) by mouth daily    Class: E-Prescribe    Route: Oral         ALLERGIES:    Allergies   Allergen Reactions     Dilantin [Phenytoin] Rash     REVIEW OF SYSTEMS:  A comprehensive review of systems was performed and found to be negative except as described here or above.     SOCIAL HISTORY:   Social History     Socioeconomic History     Marital status:      Spouse name: Not on file     Number of children: Not on file     Years of education: Not on file     Highest education level: Not on file   Occupational History     Not on file   Social Needs     Financial resource strain: Not on file     Food insecurity:     Worry: Not on file     Inability: Not on file     Transportation needs:     Medical: Not on file     Non-medical: Not on file   Tobacco Use     Smoking status: Never Smoker     Smokeless tobacco: Never Used   Substance and Sexual Activity     Alcohol use: No     Frequency: Never.  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  Remote history of use > 40 yrs ago     Sexual activity: Not on file   Lifestyle     Physical activity:     Days per week: Swims 3x/week     Minutes per session: 25 min     Stress: Not on file   Relationships   Other Topics Concern     Not on file   Social History Narrative     Not on file     FAMILY MEDICAL HISTORY:   Family History   Problem Relation Age of Onset     Kidney failure Mother      Cystic Kidney Disease Sister    Sister (70) was recently diagnosed with PKD by MRI, with multiple bilat cyst  Brother (77) with possible PKD (with few cysts on one side only)  Sister (75) does not want to be evaluated, but used to run marathons, and still runs half-marathons.    PHYSICAL EXAM:   BP (!) 170/88   Pulse (!) 49   Temp 98  F (36.7  C) (Oral)   Ht 1.727 m (5' 8\")   Wt 72 kg (158 lb 11.2 oz)   SpO2 98%   BMI 24.13 kg/m       GENERAL APPEARANCE: alert and no distress  EYES: nonicteric  NECK: supple, no " adenopathy.   RESP: lungs clear to auscultation   CV: regular rhythm, normal rate, no rub  ABDOMEN: soft, nontender, normal bowel sounds, no HSM . Kidneys not palpable today.   Extremities: no edema  MS: no evidence of inflammation in joints, no muscle tenderness  SKIN: no rash  NEURO: mentation intact and speech normal  PSYCH: affect normal/bright     LABS:   CMP  Recent Labs   Lab Test 07/30/20  1328 12/19/19  1416 06/13/19  1354 04/05/19  1249 03/28/19  1456    141 140 143 142   POTASSIUM 3.8 4.0 4.1 4.0 4.0   CHLORIDE 106 109 109 106 109   CO2 27 27 26 28 27   ANIONGAP 5 6 5  --  5   * 96 91 105* 106*   BUN 27 27 29 27 31*   CR 1.26* 1.21 1.28* 1.29 1.17   GFRESTIMATED 56* 59* 56* 55 62   GFRESTBLACK 65 69 65  --  72   JOHN 8.2* 8.8 8.5 8.8 8.7   PHOS 3.0 2.8  --   --   --    PROTTOTAL  --   --  6.4*  --  6.7*   ALBUMIN 3.9 4.2 3.9 4.4 3.9   BILITOTAL  --   --  0.3  --  0.3   ALKPHOS  --   --  63  --  65   AST  --   --  16  --  18   ALT  --   --  19  --  24     CBC  Recent Labs   Lab Test 07/30/20  1328 12/19/19  1416 06/13/19  1354 03/28/19  1456   HGB 13.0* 14.5 13.3 13.7   WBC 5.3 5.8 6.3 6.4   RBC 3.98* 4.51 4.05* 4.35*   HCT 38.1* 43.2 38.9* 41.5   MCV 96 96 96 95   MCH 32.7 32.2 32.8 31.5   MCHC 34.1 33.6 34.2 33.0   RDW 12.5 12.5 12.7 12.5    209 207 218       Recent Labs   Lab Test 07/30/20  1331 12/19/19  1405 06/13/19  1400 03/28/19  1501   UTPG 0.12 0.11 0.11 0.10             ASSESSMENT AND PLAN:   Mr Yeager presents with PKD. Previously, we reviewed together the results of the MRI, the estimated TKV, and the estimated anticipated rate of progression of CKD given the current data. It was explained that he would most likely not benefit from Tolvaptan.    Since last visit 6 months ago, his renal function is stable and he has no sig proteinuria. Will continue to optimize CKD Stage 3 cares with BP control and BMD/Anemia management.    In terms of BP, he may have White Coat Syndrome  "with diagnosis of hypertension. He does have BP discrepancy that is quite large today, with sBP 150-170 here in clinic (home 130/80), Will plan 24-Hr ABPM. Will not make changes to amlodipine or lisinopril at this time.     Mild anemia compared to previous, Iron Sat is normal. Calcium mildly low today, will check Vit D level. Phos and Albumin normal.    In terms of his cerebral aneurysm, plan for repeat MRI next year per Neurosurgery. In the interim, we counseled him on the importance of seeking medical attention when suffering head trauma/falls (much like the incident with his gutter). He expressed understanding.    Pt staffed with Dr. Maria.     F/U in 6 months    P MD Lobito  Neph Fellow  [phone]    ATTENDING ATTESTATION  This patient has been seen and evaluated by me, Feroz Maria MD on July 30, 2020 .  Discussed with Dr Robin and agree with the findings and plan in this note.  I have reviewed  Medications, Vital Signs and Labs.  BP (!) 170/88   Pulse (!) 49   Temp 98  F (36.7  C) (Oral)   Ht 1.727 m (5' 8\")   Wt 72 kg (158 lb 11.2 oz)   SpO2 98%   BMI 24.13 kg/m    No direct inguinal hernia appreciated on physical examination.  No Abd tenderness.  Per the patient's request, we will await results of Ambulatory BP monitoring, and adjust dose of lisinopril accordingly.  Patient commended and encouraged to continue swimming.  Will follow up in 3 months to review BP and adjust antihypertensive meds as needed.     Feroz Maria MD  [facility]  Department of Medicine  Division of Renal Disease and Hypertension  Pager         "

## 2020-07-30 NOTE — LETTER
7/30/2020      RE: Tariq Yeager  [address]  [facility] [address]       Nephrology Clinic    Tariq Yeager MRN:[id number] YOB: 1945  Date of Service: July 30, 2020      REASON FOR FOLLOW UP: Polycystic kidney disease    HISTORY OF PRESENT ILLNESS:  Tariq Yeager is a 73 year old male who presented initially for multiple kidney cysts, consistent with PKD.   Mr Yeager has recently been informed about the presence of bilateral renal cysts which were incidentally identified by MRI of the spine he received in early February 2018 for back pain.  The presence of bilateral renal cysts was confirmed by ultrasound also obtained recently. (2/11/19 [facility] Radiology)(see below).  He denies any history of gross hematuria, flank pain, urolithiasis, or UTIs.  He does however have a history of acute sudden incerebral hemorrhage in 2002 for which he had a prolonged ICU hospitalization at [facility] .  Note is made that he also underwent placement of a permanent IVC filter, although Mr Yeager does not recall having had a DVT.  Prior to that event, it does not appear that he was in good health.  He had an inguinal herniorrhaphy on the R and reports a recurrence of the hernia + new Inguinal hernia on the L. No history of diverticulosis     June 13, 2019  Since the last visit, he had an MRI of Brain and Abd.  MRI of the brain revealed a small aneurysm for which I put in a referral to Dr STAN Robles.  Aneurysm was deemed very small and recommended repeat MRI in 2 year.  MRI of Abd revealed an estimated total kidney volume of 1627 ml, with class IB risk  category of progression of ADPKD, with an estimated projected eGFR ~ 24 ml/min in 10 years.  He is moderating his caffeine intake, salt intake.    Still swims 3 x a week.  Feels generally well.  No HA.     December 19, 2019  Since last visit, had a cologard test that came back positive. He is scheduled to have a colonoscopy in Jan 2020.  Otherwise  "feels \"very well physically\" \"totally boring\"  Swims 25 min twice a week, without CP or ESCOBEDO.    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  No L Ext swelling.  No gross hematuria or  symptoms.  Rare diarrhea.  No abd pain.  No HA.  Admits to forgetting his meds on occasion (estimates he takes his meds 5 days a week)  Drinks 6 cups of coffee a day.      July 30, 2020  Since last visit, he notes doing well. No hospitalizations or significant illnesses. No problems with hematuria or dysuria.  No abd pain. Continues to swim regularly, 3-4x per week.   He continues to have chronic headaches, almost nightly, for many years. No new symptoms or change in character. He does snore quite loudly per the wife, but no witnessed apnea. Does not take Tylenol or Advil.  In terms of BP, home BP is running 130-140/80-90. Remains on amlodipine and lisinopril, good adherence.  Of note, he did have a fall while fixing some gutters, and did briefly lose consciousness. Feeling fine now without issue. Did not seek medical attention.      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     Hypertension 2002     Inguinal hernia 2016     Presence of IVC filter 2002     PAST SURGICAL HISTORY:  Past Surgical History:   Procedure Laterality Date     cerebral aneurysm coil  2002     CRANIOTOMY, REPAIR ANEURYSM, COMBINED  2002     HERNIORRHAPHY INGUINAL Right 2016     MEDICATIONS:  Prescription Medications as of 7/30/2020       Rx Number Disp Refills Start End Last Dispensed Date Next Fill Date Owning Pharmacy    amLODIPine (NORVASC) 10 MG tablet  90 tablet 3 4/6/2020    [facility] #[id number] - [place], [place] - [address] AT [facility] [address]    Sig: Take 1 tablet (10 mg) by mouth daily    Class: E-Prescribe    Route: Oral    lisinopril (PRINIVIL/ZESTRIL) 10 MG " "tablet  180 tablet 3 12/19/2019    [id number] [address] [facility]    Sig: Take 2 tablets (20 mg) by mouth daily    Class: E-Prescribe    Route: Oral         ALLERGIES:    Allergies   Allergen Reactions     Dilantin [Phenytoin] Rash     REVIEW OF SYSTEMS:  A comprehensive review of systems was performed and found to be negative except as described here or above.     SOCIAL HISTORY:   Social History     Socioeconomic History     Marital status:      Spouse name: Not on file     Number of children: Not on file     Years of education: Not on file     Highest education level: Not on file   Occupational History     Not on file   Social Needs     Financial resource strain: Not on file     Food insecurity:     Worry: Not on file     Inability: Not on file     Transportation needs:     Medical: Not on file     Non-medical: Not on file   Tobacco Use     Smoking status: Never Smoker     Smokeless tobacco: Never Used   Substance and Sexual Activity     Alcohol use: No     Frequency: Never.  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  Remote history of use > 40 yrs ago     Sexual activity: Not on file   Lifestyle     Physical activity:     Days per week: Swims 3x/week     Minutes per session: 25 min     Stress: Not on file   Relationships   Other Topics Concern     Not on file   Social History Narrative     Not on file     FAMILY MEDICAL HISTORY:   Family History   Problem Relation Age of Onset     Kidney failure Mother      Cystic Kidney Disease Sister    Sister (70) was recently diagnosed with PKD by MRI, with multiple bilat cyst  Brother (77) with possible PKD (with few cysts on one side only)  Sister (75) does not want to be evaluated, but used to run marathons, and still runs half-marathons.    PHYSICAL EXAM:   BP (!) 170/88   Pulse (!) 49   Temp 98  F (36.7  C) (Oral)   Ht 1.727 m (5' 8\")   Wt 72 kg (158 lb 11.2 oz)   SpO2 98%   BMI 24.13 kg/m   "     GENERAL APPEARANCE: alert and no distress  EYES: nonicteric  NECK: supple, no adenopathy.   RESP: lungs clear to auscultation   CV: regular rhythm, normal rate, no rub  ABDOMEN: soft, nontender, normal bowel sounds, no HSM . Kidneys not palpable today.   Extremities: no edema  MS: no evidence of inflammation in joints, no muscle tenderness  SKIN: no rash  NEURO: mentation intact and speech normal  PSYCH: affect normal/bright     LABS:   CMP  Recent Labs   Lab Test 07/30/20  1328 12/19/19  1416 06/13/19  1354 04/05/19  1249 03/28/19  1456    141 140 143 142   POTASSIUM 3.8 4.0 4.1 4.0 4.0   CHLORIDE 106 109 109 106 109   CO2 27 27 26 28 27   ANIONGAP 5 6 5  --  5   * 96 91 105* 106*   BUN 27 27 29 27 31*   CR 1.26* 1.21 1.28* 1.29 1.17   GFRESTIMATED 56* 59* 56* 55 62   GFRESTBLACK 65 69 65  --  72   JOHN 8.2* 8.8 8.5 8.8 8.7   PHOS 3.0 2.8  --   --   --    PROTTOTAL  --   --  6.4*  --  6.7*   ALBUMIN 3.9 4.2 3.9 4.4 3.9   BILITOTAL  --   --  0.3  --  0.3   ALKPHOS  --   --  63  --  65   AST  --   --  16  --  18   ALT  --   --  19  --  24     CBC  Recent Labs   Lab Test 07/30/20  1328 12/19/19  1416 06/13/19  1354 03/28/19  1456   HGB 13.0* 14.5 13.3 13.7   WBC 5.3 5.8 6.3 6.4   RBC 3.98* 4.51 4.05* 4.35*   HCT 38.1* 43.2 38.9* 41.5   MCV 96 96 96 95   MCH 32.7 32.2 32.8 31.5   MCHC 34.1 33.6 34.2 33.0   RDW 12.5 12.5 12.7 12.5    209 207 218       Recent Labs   Lab Test 07/30/20  1331 12/19/19  1405 06/13/19  1400 03/28/19  1501   UTPG 0.12 0.11 0.11 0.10             ASSESSMENT AND PLAN:   Mr Yeager presents with PKD. Previously, we reviewed together the results of the MRI, the estimated TKV, and the estimated anticipated rate of progression of CKD given the current data. It was explained that he would most likely not benefit from Tolvaptan.    Since last visit 6 months ago, his renal function is stable and he has no sig proteinuria. Will continue to optimize CKD Stage 3 cares with BP  "control and BMD/Anemia management.    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 He does have BP discrepancy that is quite large today, with sBP 150-170 here in clinic (home 130/80), Will plan 24-Hr ABPM. Will not make changes to amlodipine or lisinopril at this time.     Mild anemia compared to previous, Iron Sat is normal. Calcium mildly low today, will check Vit D level. Phos and Albumin normal.    In terms of his cerebral aneurysm, plan for repeat MRI next year per Neurosurgery. In the interim, we counseled him on the importance of seeking medical attention when suffering head trauma/falls (much like the incident with his gutter). He expressed understanding.    Pt staffed with Dr. Maria.     F/U in 6 months    YUDELKA Robin MD  Neph Fellow  [phone]    ATTENDING ATTESTATION  This patient has been seen and evaluated by me, Feroz Maria MD on July 30, 2020 .  Discussed with Dr Robin and agree with the findings and plan in this note.  I have reviewed  Medications, Vital Signs and Labs.  BP (!) 170/88   Pulse (!) 49   Temp 98  F (36.7  C) (Oral)   Ht 1.727 m (5' 8\")   Wt 72 kg (158 lb 11.2 oz)   SpO2 98%   BMI 24.13 kg/m    No direct inguinal hernia appreciated on physical examination.  No Abd tenderness.  Per the patient's request, we will await results of Ambulatory BP monitoring, and adjust dose of lisinopril accordingly.  Patient commended and encouraged to continue swimming.  Will follow up in 3 months to review BP and adjust antihypertensive meds as needed.     Feroz Maira MD  [facility]  Department of Medicine  Division of Renal Disease and Hypertension  Pager           Feroz Maria MD      "

## 2020-07-31 LAB — DEPRECATED CALCIDIOL+CALCIFEROL SERPL-MC: 30 UG/L (ref 20–75)

## 2020-10-20 DIAGNOSIS — Q61.3 PKD (POLYCYSTIC KIDNEY DISEASE): Primary | ICD-10-CM

## 2020-10-29 ENCOUNTER — OFFICE VISIT (OUTPATIENT)
Dept: NEPHROLOGY | Facility: CLINIC | Age: 75
End: 2020-10-29
Attending: INTERNAL MEDICINE
Payer: MEDICARE

## 2020-10-29 VITALS
BODY MASS INDEX: 24.02 KG/M2 | HEART RATE: 57 BPM | OXYGEN SATURATION: 98 % | WEIGHT: 158 LBS | SYSTOLIC BLOOD PRESSURE: 152 MMHG | DIASTOLIC BLOOD PRESSURE: 83 MMHG

## 2020-10-29 DIAGNOSIS — Q61.2 ADPKD (AUTOSOMAL DOMINANT POLYCYSTIC KIDNEY DISEASE): ICD-10-CM

## 2020-10-29 DIAGNOSIS — N18.30 STAGE 3 CHRONIC KIDNEY DISEASE, UNSPECIFIED WHETHER STAGE 3A OR 3B CKD (H): ICD-10-CM

## 2020-10-29 DIAGNOSIS — Q61.2 ADPKD (AUTOSOMAL DOMINANT POLYCYSTIC KIDNEY DISEASE): Primary | ICD-10-CM

## 2020-10-29 DIAGNOSIS — Q61.3 PKD (POLYCYSTIC KIDNEY DISEASE): ICD-10-CM

## 2020-10-29 LAB
ALBUMIN SERPL-MCNC: 4 G/DL (ref 3.4–5)
ALBUMIN UR-MCNC: NEGATIVE MG/DL
ANION GAP SERPL CALCULATED.3IONS-SCNC: 5 MMOL/L (ref 3–14)
APPEARANCE UR: ABNORMAL
BASOPHILS # BLD AUTO: 0 10E9/L (ref 0–0.2)
BASOPHILS NFR BLD AUTO: 0.5 %
BILIRUB UR QL STRIP: NEGATIVE
BUN SERPL-MCNC: 30 MG/DL (ref 7–30)
CALCIUM SERPL-MCNC: 8.6 MG/DL (ref 8.5–10.1)
CHLORIDE SERPL-SCNC: 110 MMOL/L (ref 94–109)
CO2 SERPL-SCNC: 27 MMOL/L (ref 20–32)
COLOR UR AUTO: YELLOW
CREAT SERPL-MCNC: 1.22 MG/DL (ref 0.66–1.25)
CREAT UR-MCNC: 213 MG/DL
DIFFERENTIAL METHOD BLD: ABNORMAL
EOSINOPHIL # BLD AUTO: 0.1 10E9/L (ref 0–0.7)
EOSINOPHIL NFR BLD AUTO: 1.5 %
ERYTHROCYTE [DISTWIDTH] IN BLOOD BY AUTOMATED COUNT: 12.5 % (ref 10–15)
GFR SERPL CREATININE-BSD FRML MDRD: 58 ML/MIN/{1.73_M2}
GLUCOSE SERPL-MCNC: 88 MG/DL (ref 70–99)
GLUCOSE UR STRIP-MCNC: NEGATIVE MG/DL
HCT VFR BLD AUTO: 41.7 % (ref 40–53)
HGB BLD-MCNC: 13.9 G/DL (ref 13.3–17.7)
HGB UR QL STRIP: NEGATIVE
IMM GRANULOCYTES # BLD: 0 10E9/L (ref 0–0.4)
IMM GRANULOCYTES NFR BLD: 0.2 %
KETONES UR STRIP-MCNC: NEGATIVE MG/DL
LEUKOCYTE ESTERASE UR QL STRIP: NEGATIVE
LYMPHOCYTES # BLD AUTO: 1.5 10E9/L (ref 0.8–5.3)
LYMPHOCYTES NFR BLD AUTO: 24.5 %
MCH RBC QN AUTO: 32.3 PG (ref 26.5–33)
MCHC RBC AUTO-ENTMCNC: 33.3 G/DL (ref 31.5–36.5)
MCV RBC AUTO: 97 FL (ref 78–100)
MONOCYTES # BLD AUTO: 0.7 10E9/L (ref 0–1.3)
MONOCYTES NFR BLD AUTO: 11.4 %
MUCOUS THREADS #/AREA URNS LPF: PRESENT /LPF
NEUTROPHILS # BLD AUTO: 3.8 10E9/L (ref 1.6–8.3)
NEUTROPHILS NFR BLD AUTO: 61.9 %
NITRATE UR QL: NEGATIVE
NRBC # BLD AUTO: 0 10*3/UL
NRBC BLD AUTO-RTO: 0 /100
PH UR STRIP: 5 PH (ref 5–7)
PHOSPHATE SERPL-MCNC: 3.1 MG/DL (ref 2.5–4.5)
PLATELET # BLD AUTO: 218 10E9/L (ref 150–450)
POTASSIUM SERPL-SCNC: 3.8 MMOL/L (ref 3.4–5.3)
PROT UR-MCNC: 0.26 G/L
PROT/CREAT 24H UR: 0.12 G/G CR (ref 0–0.2)
RBC # BLD AUTO: 4.31 10E12/L (ref 4.4–5.9)
RBC #/AREA URNS AUTO: 1 /HPF (ref 0–2)
SODIUM SERPL-SCNC: 142 MMOL/L (ref 133–144)
SOURCE: ABNORMAL
SP GR UR STRIP: 1.02 (ref 1–1.03)
UROBILINOGEN UR STRIP-MCNC: 0 MG/DL (ref 0–2)
WBC # BLD AUTO: 6.1 10E9/L (ref 4–11)
WBC #/AREA URNS AUTO: 1 /HPF (ref 0–5)

## 2020-10-29 PROCEDURE — 36415 COLL VENOUS BLD VENIPUNCTURE: CPT | Performed by: PATHOLOGY

## 2020-10-29 PROCEDURE — G0463 HOSPITAL OUTPT CLINIC VISIT: HCPCS

## 2020-10-29 PROCEDURE — 85025 COMPLETE CBC W/AUTO DIFF WBC: CPT | Performed by: PATHOLOGY

## 2020-10-29 PROCEDURE — 84156 ASSAY OF PROTEIN URINE: CPT | Performed by: PATHOLOGY

## 2020-10-29 PROCEDURE — 80069 RENAL FUNCTION PANEL: CPT | Performed by: PATHOLOGY

## 2020-10-29 PROCEDURE — 81001 URINALYSIS AUTO W/SCOPE: CPT | Performed by: PATHOLOGY

## 2020-10-29 PROCEDURE — 82570 ASSAY OF URINE CREATININE: CPT | Performed by: PATHOLOGY

## 2020-10-29 PROCEDURE — 99214 OFFICE O/P EST MOD 30 MIN: CPT | Performed by: INTERNAL MEDICINE

## 2020-10-29 RX ORDER — AMLODIPINE BESYLATE 10 MG/1
10 TABLET ORAL DAILY
Qty: 90 TABLET | Refills: 3 | Status: SHIPPED | OUTPATIENT
Start: 2020-10-29 | End: 2022-01-17

## 2020-10-29 RX ORDER — LISINOPRIL 10 MG/1
30 TABLET ORAL DAILY
Qty: 270 TABLET | Refills: 3 | Status: SHIPPED | OUTPATIENT
Start: 2020-10-29 | End: 2021-09-30

## 2020-10-29 ASSESSMENT — PAIN SCALES - GENERAL: PAINLEVEL: NO PAIN (0)

## 2020-10-29 NOTE — PROGRESS NOTES
"Nephrology Clinic    Tariq Yeager MRN:[id number] YOB: 1945  Date of Service: October 29, 2020        REASON FOR FOLLOW UP: Polycystic kidney disease    HISTORY OF PRESENT ILLNESS:  Tariq Yeager is a 73 year old male who presented initially for multiple kidney cysts, consistent with PKD.   Mr Yeager has recently been informed about the presence of bilateral renal cysts which were incidentally identified by MRI of the spine he received in early February 2018 for back pain.  The presence of bilateral renal cysts was confirmed by ultrasound also obtained recently. (2/11/19 [facility] Radiology)(see below).  He denies any history of gross hematuria, flank pain, urolithiasis, or UTIs.  He does however have a history of acute sudden incerebral hemorrhage in 2002 for which he had a prolonged ICU hospitalization at [facility] .  Note is made that he also underwent placement of a permanent IVC filter, although Mr Yeager does not recall having had a DVT.  Prior to that event, it does not appear that he was in good health.  He had an inguinal herniorrhaphy on the R and reports a recurrence of the hernia + new Inguinal hernia on the L. No history of diverticulosis     June 13, 2019  Since the last visit, he had an MRI of Brain and Abd.  MRI of the brain revealed a small aneurysm for which I put in a referral to Dr STAN Robles.  Aneurysm was deemed very small and recommended repeat MRI in 2 year.  MRI of Abd revealed an estimated total kidney volume of 1627 ml, with class IB risk  category of progression of ADPKD, with an estimated projected eGFR ~ 24 ml/min in 10 years.  He is moderating his caffeine intake, salt intake.    Still swims 3 x a week.  Feels generally well.  No HA.     December 19, 2019  Since last visit, had a cologard test that came back positive. He is scheduled to have a colonoscopy in Jan 2020.  Otherwise feels \"very well physically\" \"totally boring\"  Swims 25 min twice a week, " "without CP or ESCOBEDO.    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  No L Ext swelling.  No gross hematuria or  symptoms.  Rare diarrhea.  No abd pain.  No HA.  Admits to forgetting his meds on occasion (estimates he takes his meds 5 days a week)  Drinks 6 cups of coffee a day.      July 30, 2020  Since last visit, he notes doing well. No hospitalizations or significant illnesses. No problems with hematuria or dysuria.  No abd pain. Continues to swim regularly, 3-4x per week.   He continues to have chronic headaches, almost nightly, for many years. No new symptoms or change in character. He does snore quite loudly per the wife, but no witnessed apnea. Does not take Tylenol or Advil.  In terms of BP, home BP is running 130-140/80-90. Remains on amlodipine and lisinopril, good adherence.  Of note, he did have a fall while fixing some gutters, and did briefly lose consciousness. Feeling fine now without issue. Did not seek medical attention.     October 29, 2020  24 hour BP monitoring  July 30, 2020  Overall Syst  +/- 11.67  Diast BP 74+/-5.8  Pulse pressure 62+/-10.0   HR 58+/-8.9  Syst > limits 52%, Diast > limit 21%  Please see scanned document for full results  Of Note, most of the that day, he was very active doing repairs \"on a ladder\".       Has new BP machine 122-156  Most > 135  Drinks about 8 x 6 oz of coffee, has cut down to 3 cups a day.  Continues to cut down salt, paying attention to sodium content of foods.  Continues to swim 3 x a week, currently doing 14 laps + exersices,  Plus once a week aerobic exercise.    ROS;   Occasional HA in am,   Although it is mild and resolves after coffee.  Leg cramps.  No GI symptoms.  No  symptoms,   Stream may have slowed down.  No L Ext swelling.  No Change in L inguinal hernia.        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     Hypertension 2002     Inguinal hernia 2016     Presence of IVC filter 2002     PAST SURGICAL HISTORY:  Past Surgical History:   Procedure Laterality Date     cerebral aneurysm coil  2002     CRANIOTOMY, REPAIR ANEURYSM, COMBINED  2002     HERNIORRHAPHY INGUINAL Right 2016     MEDICATIONS:  Prescription Medications as of 10/29/2020       Rx Number Disp Refills Start End Last Dispensed Date Next Fill Date Owning Pharmacy    amLODIPine (NORVASC) 10 MG tablet  90 tablet 3 10/29/2020    [facility] [address] [place] [address]    Sig: Take 1 tablet (10 mg) by mouth daily    Class: E-Prescribe    Route: Oral    lisinopril (ZESTRIL) 10 MG tablet  270 tablet 3 10/29/2020    [facility] [address] [place] [address]    Sig: Takes 2 tablets (20 mg) by mouth daily    Class: E-Prescribe    Route: Oral         ALLERGIES:    Allergies   Allergen Reactions     Dilantin [Phenytoin] Rash     REVIEW OF SYSTEMS:  A comprehensive review of systems was performed and found to be negative except as described here or above.     SOCIAL HISTORY:   Social History     Socioeconomic History     Marital status:      Spouse name: Not on file     Number of children: Not on file     Years of education: Not on file     Highest education level: Not on file   Occupational History     Not on file   Social Needs     Financial resource strain: Not on file     Food insecurity:     Worry: Not on file     Inability: Not on file     Transportation needs:     Medical: Not on file     Non-medical: Not on file   Tobacco Use     Smoking status: Never Smoker     Smokeless tobacco: Never Used   Substance and Sexual Activity     Alcohol use: No     Frequency: Never.  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  Remote history of use > 40 yrs ago     Sexual activity: Not on file   Lifestyle     Physical activity:     Days per week: Swims 3x/week     " Minutes per session: 25 min     Stress: Not on file   Relationships   Other Topics Concern     Not on file   Social History Narrative     Not on file     FAMILY MEDICAL HISTORY:   Family History   Problem Relation Age of Onset     Kidney failure Mother      Cystic Kidney Disease Sister    Sister (70) was recently diagnosed with PKD by MRI, with multiple bilat cyst  Brother (77) with possible PKD (with few cysts on one side only)  Sister (75) does not want to be evaluated, but used to run marathons, and still runs half-marathons.    PHYSICAL EXAM:   BP (!) 152/83   Pulse 57   Wt 71.7 kg (158 lb)   SpO2 98%   BMI 24.02 kg/m     GENERAL APPEARANCE: alert and no distress  EYES: nonicteric  NECK: supple, no adenopathy.   RESP: lungs clear to auscultation   CV: regular rhythm, normal rate, no rub  ABDOMEN: soft, nontender, normal bowel sounds, no HSM . Kidneys not palpable .   Extremities: no edema  MS: no evidence of inflammation in joints, no muscle tenderness  SKIN: no rash  NEURO: mentation intact and speech normal  PSYCH: affect normal/bright     LABS:   CMP  Recent Labs   Lab Test 10/29/20  1407 07/30/20  1328 12/19/19  1416 06/13/19  1354 03/28/19  1456 03/28/19  1456    138 141 140   < > 142   POTASSIUM 3.8 3.8 4.0 4.1   < > 4.0   CHLORIDE 110* 106 109 109   < > 109   CO2 27 27 27 26   < > 27   ANIONGAP 5 5 6 5  --  5   GLC 88 102* 96 91   < > 106*   BUN 30 27 27 29   < > 31*   CR 1.22 1.26* 1.21 1.28*   < > 1.17   GFRESTIMATED 58* 56* 59* 56*   < > 62   GFRESTBLACK 68 65 69 65  --  72   JOHN 8.6 8.2* 8.8 8.5   < > 8.7   PHOS 3.1 3.0 2.8  --   --   --    PROTTOTAL  --   --   --  6.4*  --  6.7*   ALBUMIN 4.0 3.9 4.2 3.9   < > 3.9   BILITOTAL  --   --   --  0.3  --  0.3   ALKPHOS  --   --   --  63  --  65   AST  --   --   --  16  --  18   ALT  --   --   --  19  --  24    < > = values in this interval not displayed.     CBC  Recent Labs   Lab Test 10/29/20  1407 07/30/20  1328 12/19/19  1416 06/13/19  1350  "  HGB 13.9 13.0* 14.5 13.3   WBC 6.1 5.3 5.8 6.3   RBC 4.31* 3.98* 4.51 4.05*   HCT 41.7 38.1* 43.2 38.9*   MCV 97 96 96 96   MCH 32.3 32.7 32.2 32.8   MCHC 33.3 34.1 33.6 34.2   RDW 12.5 12.5 12.5 12.7    184 209 207       Recent Labs   Lab Test 10/29/20  1410 07/30/20  1331 12/19/19  1405 06/13/19  1400 03/28/19  1501   UTPG 0.12 0.12 0.11 0.11 0.10             ASSESSMENT AND PLAN:   Mr Yeager presents with PKD. Previously, we reviewed together the results of the MRI, the estimated TKV, and the estimated anticipated rate of progression of CKD given the current data. It was explained that he would most likely not benefit from Tolvaptan.    His renal function has been stable for the last 16 months, without proteinuria.     BP:  24 hour ambulatory BP is good on average, but ~ 50% of readings are above target and he does not \"dip\" at night.  Given the low-normal K, will increase lisinopril to 30 mg daily, continue amlodipine at current dose.  He is to have repeat BMP 2 weeks after this change.    Cerebral aneurysm, plan for repeat MRI next year per Neurosurgery.       Feroz Maria MD  [facility]  Department of Medicine  Division of Renal Disease and Hypertension  Pager         "

## 2020-10-29 NOTE — NURSING NOTE
Chief Complaint   Patient presents with     RECHECK     3 mos follow up         BP (!) 152/83   Pulse 57   Wt 71.7 kg (158 lb)   SpO2 98%   BMI 24.02 kg/m        Minh Pool, EMT

## 2020-10-29 NOTE — PATIENT INSTRUCTIONS
Please increase Lisinopril to 30 mg (3 tabs) at bedtime.  We will need a follow up lab check 2-3 weeks later.

## 2020-10-29 NOTE — LETTER
10/29/2020      RE: Tariq Yeager  [address]  [facility] [address]       Nephrology Clinic    Tariq Yeager MRN:[id number] YOB: 1945  Date of Service: October 29, 2020        REASON FOR FOLLOW UP: Polycystic kidney disease    HISTORY OF PRESENT ILLNESS:  Tariq Yeager is a 73 year old male who presented initially for multiple kidney cysts, consistent with PKD.   Mr Yeager has recently been informed about the presence of bilateral renal cysts which were incidentally identified by MRI of the spine he received in early February 2018 for back pain.  The presence of bilateral renal cysts was confirmed by ultrasound also obtained recently. (2/11/19 [facility])(see below).  He denies any history of gross hematuria, flank pain, urolithiasis, or UTIs.  He does however have a history of acute sudden incerebral hemorrhage in 2002 for which he had a prolonged ICU hospitalization at [facility] .  Note is made that he also underwent placement of a permanent IVC filter, although Mr Yeager does not recall having had a DVT.  Prior to that event, it does not appear that he was in good health.  He had an inguinal herniorrhaphy on the R and reports a recurrence of the hernia + new Inguinal hernia on the L. No history of diverticulosis     June 13, 2019  Since the last visit, he had an MRI of Brain and Abd.  MRI of the brain revealed a small aneurysm for which I put in a referral to Dr STAN Robles.  Aneurysm was deemed very small and recommended repeat MRI in 2 year.  MRI of Abd revealed an estimated total kidney volume of 1627 ml, with class IB risk  category of progression of ADPKD, with an estimated projected eGFR ~ 24 ml/min in 10 years.  He is moderating his caffeine intake, salt intake.    Still swims 3 x a week.  Feels generally well.  No HA.     December 19, 2019  Since last visit, had a cologard test that came back positive. He is scheduled to have a colonoscopy in Jan  "2020.  Otherwise feels \"very well physically\" \"totally boring\"  Swims 25 min twice a week, without CP or ESCOBEDO.    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  No L Ext swelling.  No gross hematuria or  symptoms.  Rare diarrhea.  No abd pain.  No HA.  Admits to forgetting his meds on occasion (estimates he takes his meds 5 days a week)  Drinks 6 cups of coffee a day.      July 30, 2020  Since last visit, he notes doing well. No hospitalizations or significant illnesses. No problems with hematuria or dysuria.  No abd pain. Continues to swim regularly, 3-4x per week.   He continues to have chronic headaches, almost nightly, for many years. No new symptoms or change in character. He does snore quite loudly per the wife, but no witnessed apnea. Does not take Tylenol or Advil.  In terms of BP, home BP is running 130-140/80-90. Remains on amlodipine and lisinopril, good adherence.  Of note, he did have a fall while fixing some gutters, and did briefly lose consciousness. Feeling fine now without issue. Did not seek medical attention.     October 29, 2020  24 hour BP monitoring  July 30, 2020  Overall Syst  +/- 11.67  Diast BP 74+/-5.8  Pulse pressure 62+/-10.0   HR 58+/-8.9  Syst > limits 52%, Diast > limit 21%  Please see scanned document for full results  Of Note, most of the that day, he was very active doing repairs \"on a ladder\".       Has new BP machine 122-156  Most > 135  Drinks about 8 x 6 oz of coffee, has cut down to 3 cups a day.  Continues to cut down salt, paying attention to sodium content of foods.  Continues to swim 3 x a week, currently doing 14 laps + exersices,  Plus once a week aerobic exercise.    ROS;   Occasional HA in am,   Although it is mild and resolves after coffee.  Leg cramps.  No GI symptoms.  No  symptoms,   Stream may have slowed down.  No L Ext swelling.  No Change in L inguinal hernia.        PAST MEDICAL HISTORY:  Past Medical History:   Diagnosis Date     ADPKD (autosomal dominant " "polycystic kidney disease) 02/11/2019     Cerebral hemorrhage (H) 2002     Degenerative joint disease (DJD) of lumbar spine 02/06/2019     Hypertension 2002     Inguinal hernia 2016     Presence of IVC filter 2002     PAST SURGICAL HISTORY:  Past Surgical History:   Procedure Laterality Date     cerebral aneurysm coil  2002     CRANIOTOMY, REPAIR ANEURYSM, COMBINED  2002     HERNIORRHAPHY INGUINAL Right 2016     MEDICATIONS:  Prescription Medications as of 10/29/2020       Rx Number Disp Refills Start End Last Dispensed Date Next Fill Date Owning Pharmacy    amLODIPine (NORVASC) 10 MG tablet  90 tablet 3 10/29/2020    [facility] [address] [place] [address]    Sig: Take 1 tablet (10 mg) by mouth daily    Class: E-Prescribe    Route: Oral    lisinopril (ZESTRIL) 10 MG tablet  270 tablet 3 10/29/2020    [facility] [address] [place] [address]    Sig: Takes 2 tablets (20 mg) by mouth daily    Class: E-Prescribe    Route: Oral         ALLERGIES:    Allergies   Allergen Reactions     Dilantin [Phenytoin] Rash     REVIEW OF SYSTEMS:  A comprehensive review of systems was performed and found to be negative except as described here or above.     SOCIAL HISTORY:   Social History     Socioeconomic History     Marital status:      Spouse name: Not on file     Number of children: Not on file     Years of education: Not on file     Highest education level: Not on file   Occupational History     Not on file   Social Needs     Financial resource strain: Not on file     Food insecurity:     Worry: Not on file     Inability: Not on file     Transportation needs:     Medical: Not on file     Non-medical: Not on file   Tobacco Use     Smoking status: Never Smoker     Smokeless tobacco: Never Used   Substance and Sexual Activity     Alcohol use: No     Frequency: Never.  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  Remote history of use > 40 yrs ago     Sexual " activity: Not on file   Lifestyle     Physical activity:     Days per week: Swims 3x/week     Minutes per session: 25 min     Stress: Not on file   Relationships   Other Topics Concern     Not on file   Social History Narrative     Not on file     FAMILY MEDICAL HISTORY:   Family History   Problem Relation Age of Onset     Kidney failure Mother      Cystic Kidney Disease Sister    Sister (70) was recently diagnosed with PKD by MRI, with multiple bilat cyst  Brother (77) with possible PKD (with few cysts on one side only)  Sister (75) does not want to be evaluated, but used to run marathons, and still runs half-marathons.    PHYSICAL EXAM:   BP (!) 152/83   Pulse 57   Wt 71.7 kg (158 lb)   SpO2 98%   BMI 24.02 kg/m     GENERAL APPEARANCE: alert and no distress  EYES: nonicteric  NECK: supple, no adenopathy.   RESP: lungs clear to auscultation   CV: regular rhythm, normal rate, no rub  ABDOMEN: soft, nontender, normal bowel sounds, no HSM . Kidneys not palpable .   Extremities: no edema  MS: no evidence of inflammation in joints, no muscle tenderness  SKIN: no rash  NEURO: mentation intact and speech normal  PSYCH: affect normal/bright     LABS:   CMP  Recent Labs   Lab Test 10/29/20  1407 07/30/20  1328 12/19/19  1416 06/13/19  1354 03/28/19  1456 03/28/19  1456    138 141 140   < > 142   POTASSIUM 3.8 3.8 4.0 4.1   < > 4.0   CHLORIDE 110* 106 109 109   < > 109   CO2 27 27 27 26   < > 27   ANIONGAP 5 5 6 5  --  5   GLC 88 102* 96 91   < > 106*   BUN 30 27 27 29   < > 31*   CR 1.22 1.26* 1.21 1.28*   < > 1.17   GFRESTIMATED 58* 56* 59* 56*   < > 62   GFRESTBLACK 68 65 69 65  --  72   JOHN 8.6 8.2* 8.8 8.5   < > 8.7   PHOS 3.1 3.0 2.8  --   --   --    PROTTOTAL  --   --   --  6.4*  --  6.7*   ALBUMIN 4.0 3.9 4.2 3.9   < > 3.9   BILITOTAL  --   --   --  0.3  --  0.3   ALKPHOS  --   --   --  63  --  65   AST  --   --   --  16  --  18   ALT  --   --   --  19  --  24    < > = values in this interval not displayed.  "    CBC  Recent Labs   Lab Test 10/29/20  1407 07/30/20  1328 12/19/19  1416 06/13/19  1354   HGB 13.9 13.0* 14.5 13.3   WBC 6.1 5.3 5.8 6.3   RBC 4.31* 3.98* 4.51 4.05*   HCT 41.7 38.1* 43.2 38.9*   MCV 97 96 96 96   MCH 32.3 32.7 32.2 32.8   MCHC 33.3 34.1 33.6 34.2   RDW 12.5 12.5 12.5 12.7    184 209 207       Recent Labs   Lab Test 10/29/20  1410 07/30/20  1331 12/19/19  1405 06/13/19  1400 03/28/19  1501   UTPG 0.12 0.12 0.11 0.11 0.10             ASSESSMENT AND PLAN:   Mr Yeager presents with PKD. Previously, we reviewed together the results of the MRI, the estimated TKV, and the estimated anticipated rate of progression of CKD given the current data. It was explained that he would most likely not benefit from Tolvaptan.    His renal function has been stable for the last 16 months, without proteinuria.     BP:  24 hour ambulatory BP is good on average, but ~ 50% of readings are above target and he does not \"dip\" at night.  Given the low-normal K, will increase lisinopril to 30 mg daily, continue amlodipine at current dose.  He is to have repeat BMP 2 weeks after this change.    Cerebral aneurysm, plan for repeat MRI next year per Neurosurgery.       Feroz Maria MD  [facility]  Department of Medicine  Division of Renal Disease and Hypertension  Pager             Feroz Maria MD      "

## 2020-10-29 NOTE — LETTER
10/29/2020       RE: Tariq Yeager  [address]  [place] [address]     Dear Colleague,    Thank you for referring your patient, Tariq Yeager, to the [facility] NEPHROLOGY [facility] [place] at [facility]. Please see a copy of my visit note below.    Nephrology Clinic    Tariq Yeager MRN:[id number] YOB: 1945  Date of Service: October 29, 2020        REASON FOR FOLLOW UP: Polycystic kidney disease    HISTORY OF PRESENT ILLNESS:  Tariq Yeager is a 73 year old male who presented initially for multiple kidney cysts, consistent with PKD.   Mr Yeager has recently been informed about the presence of bilateral renal cysts which were incidentally identified by MRI of the spine he received in early February 2018 for back pain.  The presence of bilateral renal cysts was confirmed by ultrasound also obtained recently. (2/11/19 [facility])(see below).  He denies any history of gross hematuria, flank pain, urolithiasis, or UTIs.  He does however have a history of acute sudden incerebral hemorrhage in 2002 for which he had a prolonged ICU hospitalization at [facility] .  Note is made that he also underwent placement of a permanent IVC filter, although Mr Yeager does not recall having had a DVT.  Prior to that event, it does not appear that he was in good health.  He had an inguinal herniorrhaphy on the R and reports a recurrence of the hernia + new Inguinal hernia on the L. No history of diverticulosis     June 13, 2019  Since the last visit, he had an MRI of Brain and Abd.  MRI of the brain revealed a small aneurysm for which I put in a referral to Dr STAN Robles.  Aneurysm was deemed very small and recommended repeat MRI in 2 year.  MRI of Abd revealed an estimated total kidney volume of 1627 ml, with class IB risk  category of progression of ADPKD, with an estimated projected eGFR ~ 24 ml/min in 10 years.  He is moderating his  "caffeine intake, salt intake.    Still swims 3 x a week.  Feels generally well.  No HA.     December 19, 2019  Since last visit, had a cologard test that came back positive. He is scheduled to have a colonoscopy in Jan 2020.  Otherwise feels \"very well physically\" \"totally boring\"  Swims 25 min twice a week, without CP or ESCOBEDO.    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  No L Ext swelling.  No gross hematuria or  symptoms.  Rare diarrhea.  No abd pain.  No HA.  Admits to forgetting his meds on occasion (estimates he takes his meds 5 days a week)  Drinks 6 cups of coffee a day.      July 30, 2020  Since last visit, he notes doing well. No hospitalizations or significant illnesses. No problems with hematuria or dysuria.  No abd pain. Continues to swim regularly, 3-4x per week.   He continues to have chronic headaches, almost nightly, for many years. No new symptoms or change in character. He does snore quite loudly per the wife, but no witnessed apnea. Does not take Tylenol or Advil.  In terms of BP, home BP is running 130-140/80-90. Remains on amlodipine and lisinopril, good adherence.  Of note, he did have a fall while fixing some gutters, and did briefly lose consciousness. Feeling fine now without issue. Did not seek medical attention.     October 29, 2020  24 hour BP monitoring  July 30, 2020  Overall Syst  +/- 11.67  Diast BP 74+/-5.8  Pulse pressure 62+/-10.0   HR 58+/-8.9  Syst > limits 52%, Diast > limit 21%  Please see scanned document for full results  Of Note, most of the that day, he was very active doing repairs \"on a ladder\".       Has new BP machine 122-156  Most > 135  Drinks about 8 x 6 oz of coffee, has cut down to 3 cups a day.  Continues to cut down salt, paying attention to sodium content of foods.  Continues to swim 3 x a week, currently doing 14 laps + exersices,  Plus once a week aerobic exercise.    ROS;   Occasional HA in am,   Although it is mild and resolves after coffee.  Leg cramps.  No GI " symptoms.  No  symptoms,   Stream may have slowed down.  No L Ext swelling.  No Change in L inguinal hernia.        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     Hypertension 2002     Inguinal hernia 2016     Presence of IVC filter 2002     PAST SURGICAL HISTORY:  Past Surgical History:   Procedure Laterality Date     cerebral aneurysm coil  2002     CRANIOTOMY, REPAIR ANEURYSM, COMBINED  2002     HERNIORRHAPHY INGUINAL Right 2016     MEDICATIONS:  Prescription Medications as of 10/29/2020       Rx Number Disp Refills Start End Last Dispensed Date Next Fill Date Owning Pharmacy    amLODIPine (NORVASC) 10 MG tablet  90 tablet 3 10/29/2020    [facility] [address] [place] [address]    Sig: Take 1 tablet (10 mg) by mouth daily    Class: E-Prescribe    Route: Oral    lisinopril (ZESTRIL) 10 MG tablet  270 tablet 3 10/29/2020    [facility] [address] [place] [address]    Sig: Takes 2 tablets (20 mg) by mouth daily    Class: E-Prescribe    Route: Oral         ALLERGIES:    Allergies   Allergen Reactions     Dilantin [Phenytoin] Rash     REVIEW OF SYSTEMS:  A comprehensive review of systems was performed and found to be negative except as described here or above.     SOCIAL HISTORY:   Social History     Socioeconomic History     Marital status:      Spouse name: Not on file     Number of children: Not on file     Years of education: Not on file     Highest education level: Not on file   Occupational History     Not on file   Social Needs     Financial resource strain: Not on file     Food insecurity:     Worry: Not on file     Inability: Not on file     Transportation needs:     Medical: Not on file     Non-medical: Not on file   Tobacco Use     Smoking status: Never Smoker     Smokeless tobacco:  "Never Used   Substance and Sexual Activity     Alcohol use: No     Frequency: Never.  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  Remote history of use > 40 yrs ago     Sexual activity: Not on file   Lifestyle     Physical activity:     Days per week: Swims 3x/week     Minutes per session: 25 min     Stress: Not on file   Relationships   Other Topics Concern     Not on file   Social History Narrative     Not on file     FAMILY MEDICAL HISTORY:   Family History   Problem Relation Age of Onset     Kidney failure Mother      Cystic Kidney Disease Sister    Sister (70) was recently diagnosed with PKD by MRI, with multiple bilat cyst  Brother (77) with possible PKD (with few cysts on one side only)  Sister (75) does not want to be evaluated, but used to run marathons, and still runs half-marathons.    PHYSICAL EXAM:   BP (!) 152/83   Pulse 57   Wt 71.7 kg (158 lb)   SpO2 98%   BMI 24.02 kg/m     GENERAL APPEARANCE: alert and no distress  EYES: nonicteric  NECK: supple, no adenopathy.   RESP: lungs clear to auscultation   CV: regular rhythm, normal rate, no rub  ABDOMEN: soft, nontender, normal bowel sounds, no HSM . Kidneys not palpable .   Extremities: no edema  MS: no evidence of inflammation in joints, no muscle tenderness  SKIN: no rash  NEURO: mentation intact and speech normal  PSYCH: affect normal/bright     LABS:   CMP  Recent Labs   Lab Test 10/29/20  1407 07/30/20  1328 12/19/19  1416 06/13/19  1354 03/28/19  1456 03/28/19  1456    138 141 140   < > 142   POTASSIUM 3.8 3.8 4.0 4.1   < > 4.0   CHLORIDE 110* 106 109 109   < > 109   CO2 27 27 27 26   < > 27   ANIONGAP 5 5 6 5  --  5   GLC 88 102* 96 91   < > 106*   BUN 30 27 27 29   < > 31*   CR 1.22 1.26* 1.21 1.28*   < > 1.17   GFRESTIMATED 58* 56* 59* 56*   < > 62   GFRESTBLACK 68 65 69 65  --  72   JOHN 8.6 8.2* 8.8 8.5   < > 8.7   PHOS 3.1 3.0 2.8  --   --   --    PROTTOTAL  --   --   --  6.4*  --  6.7*   ALBUMIN 4.0 3.9 " "4.2 3.9   < > 3.9   BILITOTAL  --   --   --  0.3  --  0.3   ALKPHOS  --   --   --  63  --  65   AST  --   --   --  16  --  18   ALT  --   --   --  19  --  24    < > = values in this interval not displayed.     CBC  Recent Labs   Lab Test 10/29/20  1407 07/30/20  1328 12/19/19  1416 06/13/19  1354   HGB 13.9 13.0* 14.5 13.3   WBC 6.1 5.3 5.8 6.3   RBC 4.31* 3.98* 4.51 4.05*   HCT 41.7 38.1* 43.2 38.9*   MCV 97 96 96 96   MCH 32.3 32.7 32.2 32.8   MCHC 33.3 34.1 33.6 34.2   RDW 12.5 12.5 12.5 12.7    184 209 207       Recent Labs   Lab Test 10/29/20  1410 07/30/20  1331 12/19/19  1405 06/13/19  1400 03/28/19  1501   UTPG 0.12 0.12 0.11 0.11 0.10             ASSESSMENT AND PLAN:   Mr Yeager presents with PKD. Previously, we reviewed together the results of the MRI, the estimated TKV, and the estimated anticipated rate of progression of CKD given the current data. It was explained that he would most likely not benefit from Tolvaptan.    His renal function has been stable for the last 16 months, without proteinuria.     BP:  24 hour ambulatory BP is good on average, but ~ 50% of readings are above target and he does not \"dip\" at night.  Given the low-normal K, will increase lisinopril to 30 mg daily, continue amlodipine at current dose.  He is to have repeat BMP 2 weeks after this change.    Cerebral aneurysm, plan for repeat MRI next year per Neurosurgery.       Feroz Maria MD  [facility]  Department of Medicine  Division of Renal Disease and Hypertension  Pager             "

## 2021-01-12 NOTE — NURSING NOTE
"Chief Complaint   Patient presents with     Consult     Possible PKD       /81   Pulse 60   Temp 97  F (36.1  C) (Oral)   Ht 1.727 m (5' 8\")   Wt 72.8 kg (160 lb 6.4 oz)   SpO2 97%   BMI 24.39 kg/m      Eladio oYst    " Soft, nontender

## 2021-01-15 ENCOUNTER — HEALTH MAINTENANCE LETTER (OUTPATIENT)
Age: 76
End: 2021-01-15

## 2021-04-15 DIAGNOSIS — Q61.2 ADPKD (AUTOSOMAL DOMINANT POLYCYSTIC KIDNEY DISEASE): Primary | ICD-10-CM

## 2021-04-28 ENCOUNTER — TELEPHONE (OUTPATIENT)
Dept: NEPHROLOGY | Facility: CLINIC | Age: 76
End: 2021-04-28

## 2021-04-28 DIAGNOSIS — Q61.2 ADPKD (AUTOSOMAL DOMINANT POLYCYSTIC KIDNEY DISEASE): Primary | ICD-10-CM

## 2021-04-28 NOTE — TELEPHONE ENCOUNTER
[facility] Call Center    Phone Message    May a detailed message be left on voicemail: yes     Reason for Call: Other: Patient is requesting a call to discuss rescheduling his appoinment to next week. Please call patient at [phone] to reschedule an in person visit.     Action Taken: Message routed to:  [facility] ([facility] MARCUS Howard    Travel Screening: Not Applicable

## 2021-05-12 ENCOUNTER — MYC MEDICAL ADVICE (OUTPATIENT)
Dept: NEPHROLOGY | Facility: CLINIC | Age: 76
End: 2021-05-12

## 2021-05-13 NOTE — TELEPHONE ENCOUNTER
Pt scheduled for virtual visit w/ Taz Segura/ Candace in Neph/ GC clinic on 6/4/21 @ 9am.  Patient requesting lab orders to be faxed to [facility] in [place], [place].  Writer sent in basket message to Dr. Maria's nurse to fax orders.    Siria FERNANEDS  Pediatric Nephrology  Patient Coordinator/ Complex Referral Specialist  [facility]/ [facility].

## 2021-05-18 ENCOUNTER — MYC MEDICAL ADVICE (OUTPATIENT)
Dept: NEPHROLOGY | Facility: CLINIC | Age: 76
End: 2021-05-18

## 2021-05-26 NOTE — TELEPHONE ENCOUNTER
Call to patient. Informed him that labs will be faxed to [facility] [phone]. Patient resides in [place], scheduled as video visit. Will confirm if Dr. Segura is credentialed in [place].  Chitra Hall LPN  Nephrology  [phone]

## 2021-06-01 ENCOUNTER — TRANSFERRED RECORDS (OUTPATIENT)
Dept: HEALTH INFORMATION MANAGEMENT | Facility: CLINIC | Age: 76
End: 2021-06-01

## 2021-06-04 ENCOUNTER — VIRTUAL VISIT (OUTPATIENT)
Dept: NEPHROLOGY | Facility: CLINIC | Age: 76
End: 2021-06-04
Payer: COMMERCIAL

## 2021-06-04 ENCOUNTER — MYC MEDICAL ADVICE (OUTPATIENT)
Dept: NEPHROLOGY | Facility: CLINIC | Age: 76
End: 2021-06-04

## 2021-06-04 DIAGNOSIS — I67.1 CEREBRAL ANEURYSM, NONRUPTURED: ICD-10-CM

## 2021-06-04 DIAGNOSIS — I10 ELEVATED BLOOD PRESSURE READING IN OFFICE WITH WHITE COAT SYNDROME, WITH DIAGNOSIS OF HYPERTENSION: ICD-10-CM

## 2021-06-04 DIAGNOSIS — N18.30 STAGE 3 CHRONIC KIDNEY DISEASE, UNSPECIFIED WHETHER STAGE 3A OR 3B CKD (H): ICD-10-CM

## 2021-06-04 DIAGNOSIS — Q61.2 ADPKD (AUTOSOMAL DOMINANT POLYCYSTIC KIDNEY DISEASE): Primary | ICD-10-CM

## 2021-06-04 PROCEDURE — 99214 OFFICE O/P EST MOD 30 MIN: CPT | Mod: 95

## 2021-06-04 NOTE — PROGRESS NOTES
"Return Visit    Chief Complaint:    The patient is a 76-year old man seen as a video visit in follow-up of presumed autosomal dominant polycystic kidney disease.    HPI:      The patient was last seen by Dr. Maria in October 2020. He is \"physically OK\" but, sadly, lost his son to COVID-19 disease in December. 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 [employer] is negative for chest pain, dyspnea, orthopnea, PND, decreased appetite, nausea, vomiting, diarrhea, constipation, flank pain, hematuria, dysuria or gait imbalance. He endorses periodic epigastric discomfort, rhinorrhea and axillary rash that he associates with lactose, and slowing urinary stream. Home blood pressures measured weekly have run 130s over 80s.    Active Medications:  Current Outpatient Medications   Medication Sig Dispense Refill     amLODIPine (NORVASC) 10 MG tablet Take 1 tablet (10 mg) by mouth daily 90 tablet 3     lisinopril (ZESTRIL) 10 MG tablet Take 3 tablets (30 mg) by mouth daily 270 tablet 3      Medications reviewed and reconciled. He's taking medications as prescribed.    Physical Exam:    Seen on video, patient is a pleasant gentleman of late middle age note you distress, alert and conversant.    Labs and Imaging:    Laboratory data to be retrieved from [facility], currently unavailable.     Assessment:    1.  Autosomal dominant polycystic kidney disease, presumed, previously classified as Peterson 1B.  2.  Hypertension, borderline control.  3.  History of intracranial bleed, under surveillance for intracranial aneurysm    Plan:    1.  Retrieve outside labs.   2.  Same medications for now. Consider uptitration of lisinopril.  3.  Return to Dr. Maria's clinic in six months, with interval follow up for labs.    Patient Education:  The patient's course of chronic kidney disease was reviewed with " him to the extent possible without labs. We reviewed course of presumed ADPKD, indicating factors relating to positive kidney prognosis. We reviewed principle of blood pressure control in slowing progression. We discussed the PKD Foundation as a resource for patients and their family members. The patient verbalized understanding of the issues and agreement with the plan.    Follow up: As above.      Time: 35 minutes, > 50% in same-day review and counseling.    Sincerely,    Nico Segura MD, PhD  Nephrology    CC:   Patient Care Team:  No Ref-Primary, Physician as PCP - General  Ra Ware MD as MD (Surgery)  Lokesh Robles MD as MD (Neurological Surgery)  Nahid Maria MD as MD (Nephrology)  Nahid Maria MD as Assigned Nephrology Provider  NAHID MARIA    Copy to patient     [address]  [facility] [id number]

## 2021-06-04 NOTE — LETTER
"  6/4/2021      RE: Tariq Yeager  [address]  [place] [facility] [address]       Return Visit    Chief Complaint:    The patient is a 76-year old man seen as a video visit in follow-up of presumed autosomal dominant polycystic kidney disease.    HPI:      The patient was last seen by Dr. Maria in October 2020. He is \"physically OK\" but, sadly, lost his son to COVID-19 disease in December. 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 [employer] is negative for chest pain, dyspnea, orthopnea, PND, decreased appetite, nausea, vomiting, diarrhea, constipation, flank pain, hematuria, dysuria or gait imbalance. He endorses periodic epigastric discomfort, rhinorrhea and axillary rash that he associates with lactose, and slowing urinary stream. Home blood pressures measured weekly have run 130s over 80s.    Active Medications:  Current Outpatient Medications   Medication Sig Dispense Refill     amLODIPine (NORVASC) 10 MG tablet Take 1 tablet (10 mg) by mouth daily 90 tablet 3     lisinopril (ZESTRIL) 10 MG tablet Take 3 tablets (30 mg) by mouth daily 270 tablet 3      Medications reviewed and reconciled. He's taking medications as prescribed.    Physical Exam:    Seen on video, patient is a pleasant gentleman of late middle age note you distress, alert and conversant.    Labs and Imaging:    Laboratory data to be retrieved from [facility], currently unavailable.     Assessment:    1.  Autosomal dominant polycystic kidney disease, presumed, previously classified as Peterson 1B.  2.  Hypertension, borderline control.  3.  History of intracranial bleed, under surveillance for intracranial aneurysm    Plan:    1.  Retrieve outside labs.   2.  Same medications for now. Consider uptitration of lisinopril.  3.  Return to Dr. Maria's clinic in six months, with interval follow up for " labs.    Patient Education:  The patient's course of chronic kidney disease was reviewed with him to the extent possible without labs. We reviewed course of presumed ADPKD, indicating factors relating to positive kidney prognosis. We reviewed principle of blood pressure control in slowing progression. We discussed the PKD Foundation as a resource for patients and their family members. The patient verbalized understanding of the issues and agreement with the plan.    Follow up: As above.      Time: 35 minutes, > 50% in same-day review and counseling.    Sincerely,    Nico Segura MD, PhD  Nephrology    CC:   Patient Care Team:  No Ref-Primary, Physician as PCP - General  Ra Ware MD as MD (Surgery)  Lokesh Robles MD as MD (Neurological Surgery)  Feroz Maria MD as MD (Nephrology)      Copy to patient     [place] [id number]        Nico Segura MD

## 2021-09-28 DIAGNOSIS — N18.30 STAGE 3 CHRONIC KIDNEY DISEASE, UNSPECIFIED WHETHER STAGE 3A OR 3B CKD (H): Primary | ICD-10-CM

## 2021-09-30 ENCOUNTER — OFFICE VISIT (OUTPATIENT)
Dept: NEPHROLOGY | Facility: CLINIC | Age: 76
End: 2021-09-30
Attending: INTERNAL MEDICINE
Payer: MEDICARE

## 2021-09-30 ENCOUNTER — LAB (OUTPATIENT)
Dept: LAB | Facility: CLINIC | Age: 76
End: 2021-09-30
Attending: INTERNAL MEDICINE
Payer: COMMERCIAL

## 2021-09-30 VITALS
HEART RATE: 52 BPM | TEMPERATURE: 98.2 F | DIASTOLIC BLOOD PRESSURE: 78 MMHG | OXYGEN SATURATION: 97 % | WEIGHT: 159.5 LBS | BODY MASS INDEX: 24.17 KG/M2 | HEIGHT: 68 IN | SYSTOLIC BLOOD PRESSURE: 146 MMHG

## 2021-09-30 DIAGNOSIS — I12.9 RENAL HYPERTENSION: ICD-10-CM

## 2021-09-30 DIAGNOSIS — Q61.2 ADPKD (AUTOSOMAL DOMINANT POLYCYSTIC KIDNEY DISEASE): Primary | ICD-10-CM

## 2021-09-30 DIAGNOSIS — I67.1 CEREBRAL ANEURYSM: ICD-10-CM

## 2021-09-30 DIAGNOSIS — N18.30 STAGE 3 CHRONIC KIDNEY DISEASE, UNSPECIFIED WHETHER STAGE 3A OR 3B CKD (H): ICD-10-CM

## 2021-09-30 LAB
ALBUMIN SERPL-MCNC: 3.6 G/DL (ref 3.4–5)
ALBUMIN UR-MCNC: NEGATIVE MG/DL
ANION GAP SERPL CALCULATED.3IONS-SCNC: 5 MMOL/L (ref 3–14)
APPEARANCE UR: CLEAR
BILIRUB UR QL STRIP: NEGATIVE
BUN SERPL-MCNC: 27 MG/DL (ref 7–30)
CALCIUM SERPL-MCNC: 8.5 MG/DL (ref 8.5–10.1)
CHLORIDE BLD-SCNC: 109 MMOL/L (ref 94–109)
CO2 SERPL-SCNC: 27 MMOL/L (ref 20–32)
COLOR UR AUTO: YELLOW
CREAT SERPL-MCNC: 1.29 MG/DL (ref 0.66–1.25)
CREAT UR-MCNC: 145 MG/DL
ERYTHROCYTE [DISTWIDTH] IN BLOOD BY AUTOMATED COUNT: 12.7 % (ref 10–15)
GFR SERPL CREATININE-BSD FRML MDRD: 55 ML/MIN/1.73M2
GLUCOSE BLD-MCNC: 104 MG/DL (ref 70–99)
GLUCOSE UR STRIP-MCNC: NEGATIVE MG/DL
HCT VFR BLD AUTO: 37.4 % (ref 40–53)
HGB BLD-MCNC: 12.9 G/DL (ref 13.3–17.7)
HGB UR QL STRIP: ABNORMAL
KETONES UR STRIP-MCNC: NEGATIVE MG/DL
LEUKOCYTE ESTERASE UR QL STRIP: NEGATIVE
MCH RBC QN AUTO: 32.6 PG (ref 26.5–33)
MCHC RBC AUTO-ENTMCNC: 34.5 G/DL (ref 31.5–36.5)
MCV RBC AUTO: 94 FL (ref 78–100)
MUCOUS THREADS #/AREA URNS LPF: PRESENT /LPF
NITRATE UR QL: NEGATIVE
PH UR STRIP: 5 [PH] (ref 5–7)
PHOSPHATE SERPL-MCNC: 2.4 MG/DL (ref 2.5–4.5)
PLATELET # BLD AUTO: 185 10E3/UL (ref 150–450)
POTASSIUM BLD-SCNC: 3.8 MMOL/L (ref 3.4–5.3)
PROT UR-MCNC: 0.23 G/L
PROT/CREAT 24H UR: 0.16 G/G CR (ref 0–0.2)
RBC # BLD AUTO: 3.96 10E6/UL (ref 4.4–5.9)
RBC URINE: 6 /HPF
SODIUM SERPL-SCNC: 141 MMOL/L (ref 133–144)
SP GR UR STRIP: 1.02 (ref 1–1.03)
SQUAMOUS EPITHELIAL: <1 /HPF
UROBILINOGEN UR STRIP-MCNC: NORMAL MG/DL
WBC # BLD AUTO: 4.6 10E3/UL (ref 4–11)
WBC URINE: 1 /HPF

## 2021-09-30 PROCEDURE — 81001 URINALYSIS AUTO W/SCOPE: CPT | Performed by: PATHOLOGY

## 2021-09-30 PROCEDURE — 84156 ASSAY OF PROTEIN URINE: CPT | Performed by: PATHOLOGY

## 2021-09-30 PROCEDURE — 99213 OFFICE O/P EST LOW 20 MIN: CPT | Mod: GC | Performed by: INTERNAL MEDICINE

## 2021-09-30 PROCEDURE — 80069 RENAL FUNCTION PANEL: CPT | Performed by: PATHOLOGY

## 2021-09-30 PROCEDURE — G0463 HOSPITAL OUTPT CLINIC VISIT: HCPCS

## 2021-09-30 PROCEDURE — 85027 COMPLETE CBC AUTOMATED: CPT | Performed by: PATHOLOGY

## 2021-09-30 PROCEDURE — 36415 COLL VENOUS BLD VENIPUNCTURE: CPT | Performed by: PATHOLOGY

## 2021-09-30 RX ORDER — LISINOPRIL 10 MG/1
40 TABLET ORAL DAILY
Qty: 270 TABLET | Refills: 3 | COMMUNITY
Start: 2021-09-30 | End: 2022-03-31

## 2021-09-30 RX ORDER — LISINOPRIL 40 MG/1
40 TABLET ORAL DAILY
Qty: 30 TABLET | Refills: 3 | Status: SHIPPED | OUTPATIENT
Start: 2021-09-30 | End: 2022-02-04

## 2021-09-30 ASSESSMENT — PAIN SCALES - GENERAL: PAINLEVEL: NO PAIN (0)

## 2021-09-30 ASSESSMENT — MIFFLIN-ST. JEOR: SCORE: 1442.99

## 2021-09-30 NOTE — PATIENT INSTRUCTIONS
Lisinopril dose increased to 40 mg daily      Continue to drink plenty of water - target 2.5 L urine output per day      Watch salt in diet      Decrease caffeine intake      Check blood pressure every day - Goal blood pressure < 130/80      Follow-up in 6 months      Referral placed to Neurosurgery for follow-up of brain aneurysm

## 2021-09-30 NOTE — NURSING NOTE
"Chief Complaint   Patient presents with     RECHECK     CKD Stage 3     BP (!) 146/78   Pulse 52   Temp 98.2  F (36.8  C) (Oral)   Ht 1.727 m (5' 8\")   Wt 72.3 kg (159 lb 8 oz)   SpO2 97%   BMI 24.25 kg/m       Giorgio Grande MA  "

## 2021-09-30 NOTE — PROGRESS NOTES
"Nephrology Clinic    Tariq Yeager MRN:[id number] YOB: 1945  Date of Service: October 29, 2020        REASON FOR FOLLOW UP: Polycystic kidney disease    HISTORY OF PRESENT ILLNESS:  Tariq Yeager is a 73 year old male who presented initially for multiple kidney cysts, consistent with PKD.   Mr Yeager has recently been informed about the presence of bilateral renal cysts which were incidentally identified by MRI of the spine he received in early February 2018 for back pain.  The presence of bilateral renal cysts was confirmed by ultrasound also obtained recently. (2/11/19 [facility])(see below).  He denies any history of gross hematuria, flank pain, urolithiasis, or UTIs.  He does however have a history of acute sudden incerebral hemorrhage in 2002 for which he had a prolonged ICU hospitalization at [facility] .  Note is made that he also underwent placement of a permanent IVC filter, although Mr Yeager does not recall having had a DVT.  Prior to that event, it does not appear that he was in good health.  He had an inguinal herniorrhaphy on the R and reports a recurrence of the hernia + new Inguinal hernia on the L. No history of diverticulosis     June 13, 2019  Since the last visit, he had an MRI of Brain and Abd.  MRI of the brain revealed a small aneurysm for which I put in a referral to Dr STAN Robles.  Aneurysm was deemed very small and recommended repeat MRI in 2 year.  MRI of Abd revealed an estimated total kidney volume of 1627 ml, with class IB risk  category of progression of ADPKD, with an estimated projected eGFR ~ 24 ml/min in 10 years.  He is moderating his caffeine intake, salt intake.    Still swims 3 x a week.  Feels generally well.  No HA.     December 19, 2019  Since last visit, had a cologard test that came back positive. He is scheduled to have a colonoscopy in Jan 2020.  Otherwise feels \"very well physically\" \"totally boring\"  Swims 25 min twice a week, " "without CP or ESCOBEDO.    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  No L Ext swelling.  No gross hematuria or  symptoms.  Rare diarrhea.  No abd pain.  No HA.  Admits to forgetting his meds on occasion (estimates he takes his meds 5 days a week)  Drinks 6 cups of coffee a day.      July 30, 2020  Since last visit, he notes doing well. No hospitalizations or significant illnesses. No problems with hematuria or dysuria.  No abd pain. Continues to swim regularly, 3-4x per week.   He continues to have chronic headaches, almost nightly, for many years. No new symptoms or change in character. He does snore quite loudly per the wife, but no witnessed apnea. Does not take Tylenol or Advil.  In terms of BP, home BP is running 130-140/80-90. Remains on amlodipine and lisinopril, good adherence.  Of note, he did have a fall while fixing some gutters, and did briefly lose consciousness. Feeling fine now without issue. Did not seek medical attention.     October 29, 2020  24 hour BP monitoring  July 30, 2020  Overall Syst  +/- 11.67  Diast BP 74+/-5.8  Pulse pressure 62+/-10.0   HR 58+/-8.9  Syst > limits 52%, Diast > limit 21%  Please see scanned document for full results  Of Note, most of the that day, he was very active doing repairs \"on a ladder\".       Has new BP machine 122-156  Most > 135  Drinks about 8 x 6 oz of coffee, has cut down to 3 cups a day.  Continues to cut down salt, paying attention to sodium content of foods.  Continues to swim 3 x a week, currently doing 14 laps + exersices,  Plus once a week aerobic exercise.    September 30, 2021  Overall feeling well. Blood pressure control sub-optimal with systolic blood pressure ranging between 130-150. Is trying to cut down on caffeine intake. Down to 4 cups a day. No interim hospitalization or change in meds. Started taking Lisinopril 30 mg about a week ago (before this was taking 20 mg).       ROS;   Occasional mild flank pain.  Leg cramps + (trying Tonic water - has " quinine, which he reports helps)  No GI symptoms.  No  symptoms,   Stream may have slowed down.  No L Ext swelling.  No Change in L inguinal hernia.        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     Hypertension 2002     Inguinal hernia 2016     Presence of IVC filter 2002     PAST SURGICAL HISTORY:  Past Surgical History:   Procedure Laterality Date     cerebral aneurysm coil  2002     CRANIOTOMY, REPAIR ANEURYSM, COMBINED  2002     HERNIORRHAPHY INGUINAL Right 2016     MEDICATIONS:  Prescription Medications as of 10/29/2020       Rx Number Disp Refills Start End Last Dispensed Date Next Fill Date Owning Pharmacy    amLODIPine (NORVASC) 10 MG tablet  90 tablet 3 10/29/2020    [facility] [address] [place] [address]    Sig: Take 1 tablet (10 mg) by mouth daily    Class: E-Prescribe    Route: Oral    lisinopril (ZESTRIL) 10 MG tablet  270 tablet 3 10/29/2020    [facility] [address] [place] [address]    Sig: Takes 2 tablets (20 mg) by mouth daily    Class: E-Prescribe    Route: Oral         ALLERGIES:    Allergies   Allergen Reactions     Dilantin [Phenytoin] Rash     REVIEW OF SYSTEMS:  A comprehensive review of systems was performed and found to be negative except as described here or above.     SOCIAL HISTORY:   Social History     Socioeconomic History     Marital status:      Spouse name: Not on file     Number of children: Not on file     Years of education: Not on file     Highest education level: Not on file   Occupational History     Not on file   Social Needs     Financial resource strain: Not on file     Food insecurity:     Worry: Not on file     Inability: Not on file     Transportation needs:     Medical: Not on file     Non-medical: Not on file   Tobacco Use     Smoking  "status: Never Smoker     Smokeless tobacco: Never Used   Substance and Sexual Activity     Alcohol use: No     Frequency: Never.  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  Remote history of use > 40 yrs ago     Sexual activity: Not on file   Lifestyle     Physical activity:     Days per week: Swims 3x/week     Minutes per session: 25 min     Stress: Not on file   Relationships   Other Topics Concern     Not on file   Social History Narrative     Not on file     FAMILY MEDICAL HISTORY:   Family History   Problem Relation Age of Onset     Kidney failure Mother      Cystic Kidney Disease Sister    Sister (70) was recently diagnosed with PKD by MRI, with multiple bilat cyst  Brother (77) with possible PKD (with few cysts on one side only)  Sister (75) does not want to be evaluated, but used to run marathons, and still runs half-marathons.    PHYSICAL EXAM:   BP (!) 146/78   Pulse 52   Temp 98.2  F (36.8  C) (Oral)   Ht 1.727 m (5' 8\")   Wt 72.3 kg (159 lb 8 oz)   SpO2 97%   BMI 24.25 kg/m     GENERAL APPEARANCE: alert and no distress  EYES: nonicteric  NECK: supple, no adenopathy.   RESP: lungs clear to auscultation   CV: regular rhythm, normal rate, no rub  ABDOMEN: soft, nontender, normal bowel sounds, no HSM . Kidneys not palpable .   Extremities: no edema  MS: no evidence of inflammation in joints, no muscle tenderness  SKIN: no rash  NEURO: mentation intact and speech normal  PSYCH: affect normal/bright     LABS:   CMP  Recent Labs   Lab Test 09/30/21  1340 10/29/20  1407 07/30/20  1328 12/19/19  1416 06/13/19  1354 06/13/19  1354 04/05/19  1249 03/28/19  1456    142 138 141   < > 140   < > 142   POTASSIUM 3.8 3.8 3.8 4.0   < > 4.1   < > 4.0   CHLORIDE 109 110* 106 109   < > 109   < > 109   CO2 27 27 27 27   < > 26   < > 27   ANIONGAP 5 5 5 6   < > 5  --  5   * 88 102* 96   < > 91   < > 106*   BUN 27 30 27 27   < > 29   < > 31*   CR 1.29* 1.22 1.26* 1.21   < > 1.28* " "  < > 1.17   GFRESTIMATED 55* 58* 56* 59*   < > 56*   < > 62   GFRESTBLACK  --  68 65 69  --  65  --  72   JOHN 8.5 8.6 8.2* 8.8   < > 8.5   < > 8.7   PHOS 2.4* 3.1 3.0 2.8  --   --   --   --    PROTTOTAL  --   --   --   --   --  6.4*  --  6.7*   ALBUMIN 3.6 4.0 3.9 4.2   < > 3.9   < > 3.9   BILITOTAL  --   --   --   --   --  0.3  --  0.3   ALKPHOS  --   --   --   --   --  63  --  65   AST  --   --   --   --   --  16  --  18   ALT  --   --   --   --   --  19  --  24    < > = values in this interval not displayed.     CBC  Recent Labs   Lab Test 09/30/21  1340 10/29/20  1407 07/30/20  1328 12/19/19  1416   HGB 12.9* 13.9 13.0* 14.5   WBC 4.6 6.1 5.3 5.8   RBC 3.96* 4.31* 3.98* 4.51   HCT 37.4* 41.7 38.1* 43.2   MCV 94 97 96 96   MCH 32.6 32.3 32.7 32.2   MCHC 34.5 33.3 34.1 33.6   RDW 12.7 12.5 12.5 12.5    218 184 209       Recent Labs   Lab Test 10/29/20  1410 07/30/20  1331 12/19/19  1405 06/13/19  1400 03/28/19  1501   UTPG 0.12 0.12 0.11 0.11 0.10             ASSESSMENT AND PLAN:   Mr Yeager presents with PKD. Previously, we reviewed together the results of the MRI, the estimated TKV, and the estimated anticipated rate of progression of CKD given the current data. It was explained that he would most likely not benefit from Tolvaptan.    His renal function has been stable for the last 24 months, without proteinuria.     BP:  BP check at home is sub-optimal. On average ~ 50% of readings are above target and he does not \"dip\" at night.  Given the low-normal K, will increase lisinopril to 40 mg daily, continue amlodipine at current dose.      Cerebral aneurysm, referral placed to Neurosurgery on determining timing for repeat MRI brain.        Davis Barajas  Nephrology Fellow  [phone]          "

## 2021-09-30 NOTE — LETTER
9/30/2021      RE: Tariq Yeager  [address]  [facility] [address]       Nephrology Clinic    Tariq Yeager MRN:[id number] YOB: 1945  Date of Service: October 29, 2020        REASON FOR FOLLOW UP: Polycystic kidney disease    HISTORY OF PRESENT ILLNESS:  Tariq Yeager is a 73 year old male who presented initially for multiple kidney cysts, consistent with PKD.   Mr Yeager has recently been informed about the presence of bilateral renal cysts which were incidentally identified by MRI of the spine he received in early February 2018 for back pain.  The presence of bilateral renal cysts was confirmed by ultrasound also obtained recently. (2/11/19 [facility])(see below).  He denies any history of gross hematuria, flank pain, urolithiasis, or UTIs.  He does however have a history of acute sudden incerebral hemorrhage in 2002 for which he had a prolonged ICU hospitalization at [facility] .  Note is made that he also underwent placement of a permanent IVC filter, although Mr Yeager does not recall having had a DVT.  Prior to that event, it does not appear that he was in good health.  He had an inguinal herniorrhaphy on the R and reports a recurrence of the hernia + new Inguinal hernia on the L. No history of diverticulosis     June 13, 2019  Since the last visit, he had an MRI of Brain and Abd.  MRI of the brain revealed a small aneurysm for which I put in a referral to Dr STAN Robles.  Aneurysm was deemed very small and recommended repeat MRI in 2 year.  MRI of Abd revealed an estimated total kidney volume of 1627 ml, with class IB risk  category of progression of ADPKD, with an estimated projected eGFR ~ 24 ml/min in 10 years.  He is moderating his caffeine intake, salt intake.    Still swims 3 x a week.  Feels generally well.  No HA.     December 19, 2019  Since last visit, had a cologard test that came back positive. He is scheduled to have a colonoscopy in Jan  "2020.  Otherwise feels \"very well physically\" \"totally boring\"  Swims 25 min twice a week, without CP or ESCOBEDO.    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  No L Ext swelling.  No gross hematuria or  symptoms.  Rare diarrhea.  No abd pain.  No HA.  Admits to forgetting his meds on occasion (estimates he takes his meds 5 days a week)  Drinks 6 cups of coffee a day.      July 30, 2020  Since last visit, he notes doing well. No hospitalizations or significant illnesses. No problems with hematuria or dysuria.  No abd pain. Continues to swim regularly, 3-4x per week.   He continues to have chronic headaches, almost nightly, for many years. No new symptoms or change in character. He does snore quite loudly per the wife, but no witnessed apnea. Does not take Tylenol or Advil.  In terms of BP, home BP is running 130-140/80-90. Remains on amlodipine and lisinopril, good adherence.  Of note, he did have a fall while fixing some gutters, and did briefly lose consciousness. Feeling fine now without issue. Did not seek medical attention.     October 29, 2020  24 hour BP monitoring  July 30, 2020  Overall Syst  +/- 11.67  Diast BP 74+/-5.8  Pulse pressure 62+/-10.0   HR 58+/-8.9  Syst > limits 52%, Diast > limit 21%  Please see scanned document for full results  Of Note, most of the that day, he was very active doing repairs \"on a ladder\".       Has new BP machine 122-156  Most > 135  Drinks about 8 x 6 oz of coffee, has cut down to 3 cups a day.  Continues to cut down salt, paying attention to sodium content of foods.  Continues to swim 3 x a week, currently doing 14 laps + exersices,  Plus once a week aerobic exercise.    September 30, 2021  Overall feeling well. Blood pressure control sub-optimal with systolic blood pressure ranging between 130-150. Is trying to cut down on caffeine intake. Down to 4 cups a day. No interim hospitalization or change in meds. Started taking Lisinopril 30 mg about a week ago (before this was taking " 20 mg).       ROS;   Occasional mild flank pain.  Leg cramps + (trying Tonic water - has quinine, which he reports helps)  No GI symptoms.  No  symptoms,   Stream may have slowed down.  No L Ext swelling.  No Change in L inguinal hernia.        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     Hypertension 2002     Inguinal hernia 2016     Presence of IVC filter 2002     PAST SURGICAL HISTORY:  Past Surgical History:   Procedure Laterality Date     cerebral aneurysm coil  2002     CRANIOTOMY, REPAIR ANEURYSM, COMBINED  2002     HERNIORRHAPHY INGUINAL Right 2016     MEDICATIONS:  Prescription Medications as of 10/29/2020       Rx Number Disp Refills Start End Last Dispensed Date Next Fill Date Owning Pharmacy    amLODIPine (NORVASC) 10 MG tablet  90 tablet 3 10/29/2020    [facility] [address] [place] [address]    Sig: Take 1 tablet (10 mg) by mouth daily    Class: E-Prescribe    Route: Oral    lisinopril (ZESTRIL) 10 MG tablet  270 tablet 3 10/29/2020    [facility] [address] [place] [address]    Sig: Takes 2 tablets (20 mg) by mouth daily    Class: E-Prescribe    Route: Oral         ALLERGIES:    Allergies   Allergen Reactions     Dilantin [Phenytoin] Rash     REVIEW OF SYSTEMS:  A comprehensive review of systems was performed and found to be negative except as described here or above.     SOCIAL HISTORY:   Social History     Socioeconomic History     Marital status:      Spouse name: Not on file     Number of children: Not on file     Years of education: Not on file     Highest education level: Not on file   Occupational History     Not on file   Social Needs     Financial resource strain: Not on file     Food insecurity:     Worry: Not on file     Inability: Not on file     Transportation  "needs:     Medical: Not on file     Non-medical: Not on file   Tobacco Use     Smoking status: Never Smoker     Smokeless tobacco: Never Used   Substance and Sexual Activity     Alcohol use: No     Frequency: Never.  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  Remote history of use > 40 yrs ago     Sexual activity: Not on file   Lifestyle     Physical activity:     Days per week: Swims 3x/week     Minutes per session: 25 min     Stress: Not on file   Relationships   Other Topics Concern     Not on file   Social History Narrative     Not on file     FAMILY MEDICAL HISTORY:   Family History   Problem Relation Age of Onset     Kidney failure Mother      Cystic Kidney Disease Sister    Sister (70) was recently diagnosed with PKD by MRI, with multiple bilat cyst  Brother (77) with possible PKD (with few cysts on one side only)  Sister (75) does not want to be evaluated, but used to run marathons, and still runs half-marathons.    PHYSICAL EXAM:   BP (!) 146/78   Pulse 52   Temp 98.2  F (36.8  C) (Oral)   Ht 1.727 m (5' 8\")   Wt 72.3 kg (159 lb 8 oz)   SpO2 97%   BMI 24.25 kg/m     GENERAL APPEARANCE: alert and no distress  EYES: nonicteric  NECK: supple, no adenopathy.   RESP: lungs clear to auscultation   CV: regular rhythm, normal rate, no rub  ABDOMEN: soft, nontender, normal bowel sounds, no HSM . Kidneys not palpable .   Extremities: no edema  MS: no evidence of inflammation in joints, no muscle tenderness  SKIN: no rash  NEURO: mentation intact and speech normal  PSYCH: affect normal/bright     LABS:   CMP  Recent Labs   Lab Test 09/30/21  1340 10/29/20  1407 07/30/20  1328 12/19/19  1416 06/13/19  1354 06/13/19  1354 04/05/19  1249 03/28/19  1456    142 138 141   < > 140   < > 142   POTASSIUM 3.8 3.8 3.8 4.0   < > 4.1   < > 4.0   CHLORIDE 109 110* 106 109   < > 109   < > 109   CO2 27 27 27 27   < > 26   < > 27   ANIONGAP 5 5 5 6   < > 5  --  5   * 88 102* 96   < > 91 " "  < > 106*   BUN 27 30 27 27   < > 29   < > 31*   CR 1.29* 1.22 1.26* 1.21   < > 1.28*   < > 1.17   GFRESTIMATED 55* 58* 56* 59*   < > 56*   < > 62   GFRESTBLACK  --  68 65 69  --  65  --  72   JOHN 8.5 8.6 8.2* 8.8   < > 8.5   < > 8.7   PHOS 2.4* 3.1 3.0 2.8  --   --   --   --    PROTTOTAL  --   --   --   --   --  6.4*  --  6.7*   ALBUMIN 3.6 4.0 3.9 4.2   < > 3.9   < > 3.9   BILITOTAL  --   --   --   --   --  0.3  --  0.3   ALKPHOS  --   --   --   --   --  63  --  65   AST  --   --   --   --   --  16  --  18   ALT  --   --   --   --   --  19  --  24    < > = values in this interval not displayed.     CBC  Recent Labs   Lab Test 09/30/21  1340 10/29/20  1407 07/30/20  1328 12/19/19  1416   HGB 12.9* 13.9 13.0* 14.5   WBC 4.6 6.1 5.3 5.8   RBC 3.96* 4.31* 3.98* 4.51   HCT 37.4* 41.7 38.1* 43.2   MCV 94 97 96 96   MCH 32.6 32.3 32.7 32.2   MCHC 34.5 33.3 34.1 33.6   RDW 12.7 12.5 12.5 12.5    218 184 209       Recent Labs   Lab Test 10/29/20  1410 07/30/20  1331 12/19/19  1405 06/13/19  1400 03/28/19  1501   UTPG 0.12 0.12 0.11 0.11 0.10             ASSESSMENT AND PLAN:   Mr Yeager presents with PKD. Previously, we reviewed together the results of the MRI, the estimated TKV, and the estimated anticipated rate of progression of CKD given the current data. It was explained that he would most likely not benefit from Tolvaptan.    His renal function has been stable for the last 24 months, without proteinuria.     BP:  BP check at home is sub-optimal. On average ~ 50% of readings are above target and he does not \"dip\" at night.  Given the low-normal K, will increase lisinopril to 40 mg daily, continue amlodipine at current dose.      Cerebral aneurysm, referral placed to Neurosurgery on determining timing for repeat MRI brain.        Davis Barajas  Nephrology Fellow  [phone]            Attestation signed by Feroz Maria MD at 10/7/2021  8:11 AM:  Attestation:  This patient was evaluated by me, Feroz Maria MD " "on September 30, 2021 jointly with Dr Barajas.  Discussed with Dr Barajas and agree with the findings and plan in this note.  I have reviewed  Medications, Vital Signs, and Labs.  BP (!) 146/78   Pulse 52   Temp 98.2  F (36.8  C) (Oral)   Ht 1.727 m (5' 8\")   Wt 72.3 kg (159 lb 8 oz)   SpO2 97%   BMI 24.25 kg/m    Mr Yeager continues to do generally very well.  His renal function is stable.    His BP is slightly above target.  Concur with increasing the dose of lisinopril.  RTC in 6 m.    Feroz Maria MD  [facility]  Department of Medicine  Division of Renal Disease and Hypertension  Pager         Feroz Maria MD      "

## 2021-09-30 NOTE — LETTER
9/30/2021       RE: Tariq Yeager  [address]  [place] [address]     Dear Colleague,    Thank you for referring your patient, Tariq Yeager, to the [facility]PHROLOG[facility]IC [place] [facility]. Please see a copy of my visit note below.    Nephrology Clinic    Tariq Yeager MRN:[id number] YOB: 1945  Date of Service: October 29, 2020        REASON FOR FOLLOW UP: Polycystic kidney disease    HISTORY OF PRESENT ILLNESS:  Tariq Yeager is a 73 year old male who presented initially for multiple kidney cysts, consistent with PKD.   Mr Yeager has recently been informed about the presence of bilateral renal cysts which were incidentally identified by MRI of the spine he received in early February 2018 for back pain.  The presence of bilateral renal cysts was confirmed by ultrasound also obtained recently. (2/11/19 [facility])(see below).  He denies any history of gross hematuria, flank pain, urolithiasis, or UTIs.  He does however have a history of acute sudden incerebral hemorrhage in 2002 for which he had a prolonged ICU hospitalization at [facility] .  Note is made that he also underwent placement of a permanent IVC filter, although Mr Yeager does not recall having had a DVT.  Prior to that event, it does not appear that he was in good health.  He had an inguinal herniorrhaphy on the R and reports a recurrence of the hernia + new Inguinal hernia on the L. No history of diverticulosis     June 13, 2019  Since the last visit, he had an MRI of Brain and Abd.  MRI of the brain revealed a small aneurysm for which I put in a referral to Dr STAN Robles.  Aneurysm was deemed very small and recommended repeat MRI in 2 year.  MRI of Abd revealed an estimated total kidney volume of 1627 ml, with class IB risk  category of progression of ADPKD, with an estimated projected eGFR ~ 24 ml/min in 10 years.  He is moderating  "his caffeine intake, salt intake.    Still swims 3 x a week.  Feels generally well.  No HA.     December 19, 2019  Since last visit, had a cologard test that came back positive. He is scheduled to have a colonoscopy in Jan 2020.  Otherwise feels \"very well physically\" \"totally boring\"  Swims 25 min twice a week, without CP or ESCOBEDO.    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  No L Ext swelling.  No gross hematuria or  symptoms.  Rare diarrhea.  No abd pain.  No HA.  Admits to forgetting his meds on occasion (estimates he takes his meds 5 days a week)  Drinks 6 cups of coffee a day.      July 30, 2020  Since last visit, he notes doing well. No hospitalizations or significant illnesses. No problems with hematuria or dysuria.  No abd pain. Continues to swim regularly, 3-4x per week.   He continues to have chronic headaches, almost nightly, for many years. No new symptoms or change in character. He does snore quite loudly per the wife, but no witnessed apnea. Does not take Tylenol or Advil.  In terms of BP, home BP is running 130-140/80-90. Remains on amlodipine and lisinopril, good adherence.  Of note, he did have a fall while fixing some gutters, and did briefly lose consciousness. Feeling fine now without issue. Did not seek medical attention.     October 29, 2020  24 hour BP monitoring  July 30, 2020  Overall Syst  +/- 11.67  Diast BP 74+/-5.8  Pulse pressure 62+/-10.0   HR 58+/-8.9  Syst > limits 52%, Diast > limit 21%  Please see scanned document for full results  Of Note, most of the that day, he was very active doing repairs \"on a ladder\".       Has new BP machine 122-156  Most > 135  Drinks about 8 x 6 oz of coffee, has cut down to 3 cups a day.  Continues to cut down salt, paying attention to sodium content of foods.  Continues to swim 3 x a week, currently doing 14 laps + exersices,  Plus once a week aerobic exercise.    September 30, 2021  Overall feeling well. Blood pressure control sub-optimal with systolic " blood pressure ranging between 130-150. Is trying to cut down on caffeine intake. Down to 4 cups a day. No interim hospitalization or change in meds. Started taking Lisinopril 30 mg about a week ago (before this was taking 20 mg).       ROS;   Occasional mild flank pain.  Leg cramps + (trying Tonic water - has quinine, which he reports helps)  No GI symptoms.  No  symptoms,   Stream may have slowed down.  No L Ext swelling.  No Change in L inguinal hernia.        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     Hypertension 2002     Inguinal hernia 2016     Presence of IVC filter 2002     PAST SURGICAL HISTORY:  Past Surgical History:   Procedure Laterality Date     cerebral aneurysm coil  2002     CRANIOTOMY, REPAIR ANEURYSM, COMBINED  2002     HERNIORRHAPHY INGUINAL Right 2016     MEDICATIONS:  Prescription Medications as of 10/29/2020       Rx Number Disp Refills Start End Last Dispensed Date Next Fill Date Owning Pharmacy    amLODIPine (NORVASC) 10 MG tablet  90 tablet 3 10/29/2020    [facility] [address] [place] [address]    Sig: Take 1 tablet (10 mg) by mouth daily    Class: E-Prescribe    Route: Oral    lisinopril (ZESTRIL) 10 MG tablet  270 tablet 3 10/29/2020    [facility] [address] [place] [address]    Sig: Takes 2 tablets (20 mg) by mouth daily    Class: E-Prescribe    Route: Oral         ALLERGIES:    Allergies   Allergen Reactions     Dilantin [Phenytoin] Rash     REVIEW OF SYSTEMS:  A comprehensive review of systems was performed and found to be negative except as described here or above.     SOCIAL HISTORY:   Social History     Socioeconomic History     Marital status:      Spouse name: Not on file     Number of children: Not on file     Years of education: Not on file      "Highest education level: Not on file   Occupational History     Not on file   Social Needs     Financial resource strain: Not on file     Food insecurity:     Worry: Not on file     Inability: Not on file     Transportation needs:     Medical: Not on file     Non-medical: Not on file   Tobacco Use     Smoking status: Never Smoker     Smokeless tobacco: Never Used   Substance and Sexual Activity     Alcohol use: No     Frequency: Never.  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  Remote history of use > 40 yrs ago     Sexual activity: Not on file   Lifestyle     Physical activity:     Days per week: Swims 3x/week     Minutes per session: 25 min     Stress: Not on file   Relationships   Other Topics Concern     Not on file   Social History Narrative     Not on file     FAMILY MEDICAL HISTORY:   Family History   Problem Relation Age of Onset     Kidney failure Mother      Cystic Kidney Disease Sister    Sister (70) was recently diagnosed with PKD by MRI, with multiple bilat cyst  Brother (77) with possible PKD (with few cysts on one side only)  Sister (75) does not want to be evaluated, but used to run marathons, and still runs half-marathons.    PHYSICAL EXAM:   BP (!) 146/78   Pulse 52   Temp 98.2  F (36.8  C) (Oral)   Ht 1.727 m (5' 8\")   Wt 72.3 kg (159 lb 8 oz)   SpO2 97%   BMI 24.25 kg/m     GENERAL APPEARANCE: alert and no distress  EYES: nonicteric  NECK: supple, no adenopathy.   RESP: lungs clear to auscultation   CV: regular rhythm, normal rate, no rub  ABDOMEN: soft, nontender, normal bowel sounds, no HSM . Kidneys not palpable .   Extremities: no edema  MS: no evidence of inflammation in joints, no muscle tenderness  SKIN: no rash  NEURO: mentation intact and speech normal  PSYCH: affect normal/bright     LABS:   CMP  Recent Labs   Lab Test 09/30/21  1340 10/29/20  1407 07/30/20  1328 12/19/19  1416 06/13/19  1354 06/13/19  1354 04/05/19  1249 03/28/19  1456    142 " "138 141   < > 140   < > 142   POTASSIUM 3.8 3.8 3.8 4.0   < > 4.1   < > 4.0   CHLORIDE 109 110* 106 109   < > 109   < > 109   CO2 27 27 27 27   < > 26   < > 27   ANIONGAP 5 5 5 6   < > 5  --  5   * 88 102* 96   < > 91   < > 106*   BUN 27 30 27 27   < > 29   < > 31*   CR 1.29* 1.22 1.26* 1.21   < > 1.28*   < > 1.17   GFRESTIMATED 55* 58* 56* 59*   < > 56*   < > 62   GFRESTBLACK  --  68 65 69  --  65  --  72   JOHN 8.5 8.6 8.2* 8.8   < > 8.5   < > 8.7   PHOS 2.4* 3.1 3.0 2.8  --   --   --   --    PROTTOTAL  --   --   --   --   --  6.4*  --  6.7*   ALBUMIN 3.6 4.0 3.9 4.2   < > 3.9   < > 3.9   BILITOTAL  --   --   --   --   --  0.3  --  0.3   ALKPHOS  --   --   --   --   --  63  --  65   AST  --   --   --   --   --  16  --  18   ALT  --   --   --   --   --  19  --  24    < > = values in this interval not displayed.     CBC  Recent Labs   Lab Test 09/30/21  1340 10/29/20  1407 07/30/20  1328 12/19/19  1416   HGB 12.9* 13.9 13.0* 14.5   WBC 4.6 6.1 5.3 5.8   RBC 3.96* 4.31* 3.98* 4.51   HCT 37.4* 41.7 38.1* 43.2   MCV 94 97 96 96   MCH 32.6 32.3 32.7 32.2   MCHC 34.5 33.3 34.1 33.6   RDW 12.7 12.5 12.5 12.5    218 184 209       Recent Labs   Lab Test 10/29/20  1410 07/30/20  1331 12/19/19  1405 06/13/19  1400 03/28/19  1501   UTPG 0.12 0.12 0.11 0.11 0.10             ASSESSMENT AND PLAN:   Mr Yeager presents with PKD. Previously, we reviewed together the results of the MRI, the estimated TKV, and the estimated anticipated rate of progression of CKD given the current data. It was explained that he would most likely not benefit from Tolvaptan.    His renal function has been stable for the last 24 months, without proteinuria.     BP:  BP check at home is sub-optimal. On average ~ 50% of readings are above target and he does not \"dip\" at night.  Given the low-normal K, will increase lisinopril to 40 mg daily, continue amlodipine at current dose.      Cerebral aneurysm, referral placed to Neurosurgery on " "determining timing for repeat MRI brain.        Davis Barajas  Nephrology Fellow  [id number]            Attestation signed by Feroz Maria MD at 10/7/2021  8:11 AM:  Attestation:  This patient was evaluated by me, Feroz Maria MD on September 30, 2021 jointly with Dr Barajas.  Discussed with Dr Barajas and agree with the findings and plan in this note.  I have reviewed  Medications, Vital Signs, and Labs.  BP (!) 146/78   Pulse 52   Temp 98.2  F (36.8  C) (Oral)   Ht 1.727 m (5' 8\")   Wt 72.3 kg (159 lb 8 oz)   SpO2 97%   BMI 24.25 kg/m    Mr Yeager continues to do generally very well.  His renal function is stable.    His BP is slightly above target.  Concur with increasing the dose of lisinopril.  RTC in 6 m.    Feroz Maria MD  [facility]  Department of Medicine  Division of Renal Disease and Hypertension  Pager         Again, thank you for allowing me to participate in the care of your patient.      Sincerely,    Feroz Maria MD      "

## 2021-10-24 ENCOUNTER — HEALTH MAINTENANCE LETTER (OUTPATIENT)
Age: 76
End: 2021-10-24

## 2021-11-27 ENCOUNTER — TRANSFERRED RECORDS (OUTPATIENT)
Dept: HEALTH INFORMATION MANAGEMENT | Facility: CLINIC | Age: 76
End: 2021-11-27

## 2022-01-17 ENCOUNTER — TELEPHONE (OUTPATIENT)
Dept: NEPHROLOGY | Facility: CLINIC | Age: 77
End: 2022-01-17
Payer: COMMERCIAL

## 2022-01-17 DIAGNOSIS — Q61.2 ADPKD (AUTOSOMAL DOMINANT POLYCYSTIC KIDNEY DISEASE): ICD-10-CM

## 2022-01-17 RX ORDER — AMLODIPINE BESYLATE 10 MG/1
10 TABLET ORAL DAILY
Qty: 90 TABLET | Refills: 3 | Status: SHIPPED | OUTPATIENT
Start: 2022-01-17 | End: 2023-04-07

## 2022-01-17 NOTE — TELEPHONE ENCOUNTER
[facility] Call Center    Phone Message    May a detailed message be left on voicemail: yes     Reason for Call: Medication Refill Request    Has the patient contacted the pharmacy for the refill? Yes   Name of medication being requested: amLODIPine (NORVASC) 10 MG tablet  Provider who prescribed the medication: Dr. Maria  Pharmacy: [id number] [place], [place] - [address]  Date medication is needed: 1/17/22         Action Taken: Message routed to:  [facility] ([facility]): [facility]    Travel Screening: Not Applicable

## 2022-01-17 NOTE — TELEPHONE ENCOUNTER
Amlodipine prescription refill sent in. Message routed to schedulers to call for follow up with Dr. Maria.  Chitra Hall LPN  Nephrology  [phone]

## 2022-02-04 ENCOUNTER — TELEPHONE (OUTPATIENT)
Dept: NEPHROLOGY | Facility: CLINIC | Age: 77
End: 2022-02-04
Payer: COMMERCIAL

## 2022-02-04 DIAGNOSIS — Q61.2 ADPKD (AUTOSOMAL DOMINANT POLYCYSTIC KIDNEY DISEASE): Primary | ICD-10-CM

## 2022-02-04 RX ORDER — LOSARTAN POTASSIUM 50 MG/1
100 TABLET ORAL DAILY
Qty: 180 TABLET | Refills: 3 | Status: SHIPPED | OUTPATIENT
Start: 2022-02-04 | End: 2022-03-31

## 2022-02-04 NOTE — TELEPHONE ENCOUNTER
[facility] Call Center    Phone Message    May a detailed message be left on voicemail: yes     Reason for Call: Medication Question or concern regarding medication   Prescription Clarification  Name of Medication: losartan (COZAAR) 50 MG tablet    Prescribing Provider: Dr. Maria   Pharmacy: [id number] [facility] [address]   What on the order needs clarification? Brenda called and asked if the pt could get the 100 MG tablet as they can not get the 50 MG tablets at this time. Please call pharmacy at [phone] to further discuss, Thanks!      Action Taken: Message routed to:  [facility] ([facility]): Neph    Travel Screening: Not Applicable

## 2022-02-13 ENCOUNTER — HEALTH MAINTENANCE LETTER (OUTPATIENT)
Age: 77
End: 2022-02-13

## 2022-02-25 ENCOUNTER — TELEPHONE (OUTPATIENT)
Dept: NEUROSURGERY | Facility: CLINIC | Age: 77
End: 2022-02-25
Payer: COMMERCIAL

## 2022-02-28 DIAGNOSIS — I67.1 CEREBRAL ANEURYSM: Primary | ICD-10-CM

## 2022-03-01 ENCOUNTER — TELEPHONE (OUTPATIENT)
Dept: NEUROSURGERY | Facility: CLINIC | Age: 77
End: 2022-03-01
Payer: COMMERCIAL

## 2022-03-01 NOTE — TELEPHONE ENCOUNTER
[facility] Call Center    Phone Message    May a detailed message be left on voicemail: yes     Reason for Call: Other: Pt is scheduled for MRI at 9 am on 4/5/22 and and appt with Dr. Robles at 920am.      Pt wants to make sure his will be enough time for images to get to Dr. Robles.    Please call Pt back to advise whether or not  one of these appts needs to be rescheduled.    Action Taken: Message routed to:  [facility] ([facility]): Neurosurgery    Travel Screening: Not Applicable

## 2022-03-02 NOTE — TELEPHONE ENCOUNTER
Writer routed call to Neurosurgery Clinic Scheduling.   Patient would like to have same day/but a later time for appointment with Dr. Robles.   MRI and MD appointment too close together.     Please assist patient with coordination of care in scheduling imaging and appointment.     Bessy Crandall LPN  Neurosurgery

## 2022-03-31 ENCOUNTER — OFFICE VISIT (OUTPATIENT)
Dept: NEPHROLOGY | Facility: CLINIC | Age: 77
End: 2022-03-31
Attending: INTERNAL MEDICINE
Payer: MEDICARE

## 2022-03-31 ENCOUNTER — LAB (OUTPATIENT)
Dept: LAB | Facility: CLINIC | Age: 77
End: 2022-03-31
Attending: INTERNAL MEDICINE
Payer: COMMERCIAL

## 2022-03-31 VITALS
WEIGHT: 160.9 LBS | BODY MASS INDEX: 24.46 KG/M2 | HEART RATE: 55 BPM | OXYGEN SATURATION: 95 % | SYSTOLIC BLOOD PRESSURE: 153 MMHG | DIASTOLIC BLOOD PRESSURE: 82 MMHG

## 2022-03-31 DIAGNOSIS — N18.30 STAGE 3 CHRONIC KIDNEY DISEASE, UNSPECIFIED WHETHER STAGE 3A OR 3B CKD (H): ICD-10-CM

## 2022-03-31 DIAGNOSIS — Q61.2 ADPKD (AUTOSOMAL DOMINANT POLYCYSTIC KIDNEY DISEASE): ICD-10-CM

## 2022-03-31 DIAGNOSIS — I12.9 HYPERTENSION, RENAL: Primary | ICD-10-CM

## 2022-03-31 LAB
ALBUMIN SERPL-MCNC: 3.9 G/DL (ref 3.4–5)
ALBUMIN UR-MCNC: NEGATIVE MG/DL
ANION GAP SERPL CALCULATED.3IONS-SCNC: 7 MMOL/L (ref 3–14)
APPEARANCE UR: CLEAR
BILIRUB UR QL STRIP: NEGATIVE
BUN SERPL-MCNC: 25 MG/DL (ref 7–30)
CALCIUM SERPL-MCNC: 8.5 MG/DL (ref 8.5–10.1)
CHLORIDE BLD-SCNC: 111 MMOL/L (ref 94–109)
CO2 SERPL-SCNC: 28 MMOL/L (ref 20–32)
COLOR UR AUTO: YELLOW
CREAT SERPL-MCNC: 1.22 MG/DL (ref 0.66–1.25)
CREAT UR-MCNC: 92 MG/DL
ERYTHROCYTE [DISTWIDTH] IN BLOOD BY AUTOMATED COUNT: 12.5 % (ref 10–15)
GFR SERPL CREATININE-BSD FRML MDRD: 62 ML/MIN/1.73M2
GLUCOSE BLD-MCNC: 108 MG/DL (ref 70–99)
GLUCOSE UR STRIP-MCNC: NEGATIVE MG/DL
HCT VFR BLD AUTO: 40.8 % (ref 40–53)
HGB BLD-MCNC: 13.7 G/DL (ref 13.3–17.7)
HGB UR QL STRIP: NEGATIVE
KETONES UR STRIP-MCNC: NEGATIVE MG/DL
LEUKOCYTE ESTERASE UR QL STRIP: NEGATIVE
MCH RBC QN AUTO: 32.2 PG (ref 26.5–33)
MCHC RBC AUTO-ENTMCNC: 33.6 G/DL (ref 31.5–36.5)
MCV RBC AUTO: 96 FL (ref 78–100)
MUCOUS THREADS #/AREA URNS LPF: PRESENT /LPF
NITRATE UR QL: NEGATIVE
PH UR STRIP: 6 [PH] (ref 5–7)
PHOSPHATE SERPL-MCNC: 2.7 MG/DL (ref 2.5–4.5)
PLATELET # BLD AUTO: 226 10E3/UL (ref 150–450)
POTASSIUM BLD-SCNC: 4.2 MMOL/L (ref 3.4–5.3)
PROT UR-MCNC: 0.15 G/L
PROT/CREAT 24H UR: 0.16 G/G CR (ref 0–0.2)
RBC # BLD AUTO: 4.26 10E6/UL (ref 4.4–5.9)
RBC URINE: 1 /HPF
SODIUM SERPL-SCNC: 146 MMOL/L (ref 133–144)
SP GR UR STRIP: 1.01 (ref 1–1.03)
SQUAMOUS EPITHELIAL: <1 /HPF
UROBILINOGEN UR STRIP-MCNC: NORMAL MG/DL
WBC # BLD AUTO: 6.9 10E3/UL (ref 4–11)
WBC URINE: 2 /HPF

## 2022-03-31 PROCEDURE — 84156 ASSAY OF PROTEIN URINE: CPT | Performed by: PATHOLOGY

## 2022-03-31 PROCEDURE — 85027 COMPLETE CBC AUTOMATED: CPT | Performed by: PATHOLOGY

## 2022-03-31 PROCEDURE — 81001 URINALYSIS AUTO W/SCOPE: CPT | Performed by: PATHOLOGY

## 2022-03-31 PROCEDURE — 80069 RENAL FUNCTION PANEL: CPT | Performed by: PATHOLOGY

## 2022-03-31 PROCEDURE — 99213 OFFICE O/P EST LOW 20 MIN: CPT | Performed by: INTERNAL MEDICINE

## 2022-03-31 PROCEDURE — 36415 COLL VENOUS BLD VENIPUNCTURE: CPT | Performed by: PATHOLOGY

## 2022-03-31 RX ORDER — LOSARTAN POTASSIUM 100 MG/1
100 TABLET ORAL DAILY
Qty: 90 TABLET | Refills: 3 | Status: SHIPPED | OUTPATIENT
Start: 2022-03-31 | End: 2023-04-07

## 2022-03-31 ASSESSMENT — PAIN SCALES - GENERAL: PAINLEVEL: NO PAIN (0)

## 2022-03-31 NOTE — PROGRESS NOTES
"Nephrology Clinic    Tariq Yeager MRN:[id number] YOB: 1945  Date of initial Service: October 29, 2020  Date of Service 3/31/2022        REASON FOR FOLLOW UP: Polycystic kidney disease    HISTORY OF PRESENT ILLNESS:  Tariq Yeager is a 73 year old male who presented initially for multiple kidney cysts, consistent with PKD.   Mr Yeager has recently been informed about the presence of bilateral renal cysts which were incidentally identified by MRI of the spine he received in early February 2018 for back pain.  The presence of bilateral renal cysts was confirmed by ultrasound also obtained recently. (2/11/19 [facility])(see below).  He denies any history of gross hematuria, flank pain, urolithiasis, or UTIs.  He does however have a history of acute sudden incerebral hemorrhage in 2002 for which he had a prolonged ICU hospitalization at [facility] .  Note is made that he also underwent placement of a permanent IVC filter, although Mr Yeager does not recall having had a DVT.  Prior to that event, it does not appear that he was in good health.  He had an inguinal herniorrhaphy on the R and reports a recurrence of the hernia + new Inguinal hernia on the L. No history of diverticulosis     June 13, 2019  Since the last visit, he had an MRI of Brain and Abd.  MRI of the brain revealed a small aneurysm for which I put in a referral to Dr STAN Robles.  Aneurysm was deemed very small and recommended repeat MRI in 2 year.  MRI of Abd revealed an estimated total kidney volume of 1627 ml, with class IB risk  category of progression of ADPKD, with an estimated projected eGFR ~ 24 ml/min in 10 years.  He is moderating his caffeine intake, salt intake.    Still swims 3 x a week.  Feels generally well.  No HA.     December 19, 2019  Since last visit, had a cologard test that came back positive. He is scheduled to have a colonoscopy in Jan 2020.  Otherwise feels \"very well physically\" \"totally " "boring\"  Swims 25 min twice a week, without CP or ESCOBEDO.    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  No L Ext swelling.  No gross hematuria or  symptoms.  Rare diarrhea.  No abd pain.  No HA.  Admits to forgetting his meds on occasion (estimates he takes his meds 5 days a week)  Drinks 6 cups of coffee a day.      July 30, 2020  Since last visit, he notes doing well. No hospitalizations or significant illnesses. No problems with hematuria or dysuria.  No abd pain. Continues to swim regularly, 3-4x per week.   He continues to have chronic headaches, almost nightly, for many years. No new symptoms or change in character. He does snore quite loudly per the wife, but no witnessed apnea. Does not take Tylenol or Advil.  In terms of BP, home BP is running 130-140/80-90. Remains on amlodipine and lisinopril, good adherence.  Of note, he did have a fall while fixing some gutters, and did briefly lose consciousness. Feeling fine now without issue. Did not seek medical attention.     October 29, 2020  24 hour BP monitoring  July 30, 2020  Overall Syst  +/- 11.67  Diast BP 74+/-5.8  Pulse pressure 62+/-10.0   HR 58+/-8.9  Syst > limits 52%, Diast > limit 21%  Please see scanned document for full results  Of Note, most of the that day, he was very active doing repairs \"on a ladder\".       Has new BP machine 122-156  Most > 135  Drinks about 8 x 6 oz of coffee, has cut down to 3 cups a day.  Continues to cut down salt, paying attention to sodium content of foods.  Continues to swim 3 x a week, currently doing 14 laps + exersices,  Plus once a week aerobic exercise.    September 30, 2021  Overall feeling well. Blood pressure control sub-optimal with systolic blood pressure ranging between 130-150. Is trying to cut down on caffeine intake. Down to 4 cups a day. No interim hospitalization or change in meds. Started taking Lisinopril 30 mg about a week ago (before this was taking 20 mg).     ROS;   Occasional mild flank pain.  Leg " cramps + (trying Tonic water - has quinine, which he reports helps)  No GI symptoms.  No  symptoms,   Stream may have slowed down.  No L Ext swelling.  No Change in L inguinal hernia.        March 31, 2022  Feels generally well.  To have repeat MRI of brain next week to follow up on small aneurysm seen 2 years ago.  No GI or  symptoms  Had nocturnal pressure CP, recurrent. 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     Hypertension 2002     Inguinal hernia 2016     Presence of IVC filter 2002     PAST SURGICAL HISTORY:  Past Surgical History:   Procedure Laterality Date     cerebral aneurysm coil  2002     CRANIOTOMY, REPAIR ANEURYSM, COMBINED  2002     HERNIORRHAPHY INGUINAL Right 2016     MEDICATIONS:  Prescription Medications reviewed 3/31/2022           Current Outpatient Medications reviewed 3/31/2022   Medication Sig Dispense Refill     amLODIPine (NORVASC) 10 MG tablet Take 1 tablet (10 mg) by mouth daily 90 tablet 3     losartan (COZAAR) 50 MG tablet Take 2 tablets (100 mg) by mouth daily He is taking only 1 tablet  3       ALLERGIES:    Allergies   Allergen Reactions     Dilantin [Phenytoin] Rash     REVIEW OF SYSTEMS:  A comprehensive review of systems was performed and found to be negative except as described here or above.     SOCIAL HISTORY:   Social History     Socioeconomic History     Marital status:      Spouse name: Not on file     Number of children: Not on file     Years of education: Not on file     Highest education level: Not on file   Occupational History     Not on file   Social Needs     Financial resource strain: Not on file     Food insecurity:     Worry: Not on file     Inability: Not on file      "Transportation needs:     Medical: Not on file     Non-medical: Not on file   Tobacco Use     Smoking status: Never Smoker     Smokeless tobacco: Never Used   Substance and Sexual Activity     Alcohol use: No     Frequency: Never.  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  Remote history of use > 40 yrs ago     Sexual activity: Not on file   Lifestyle     Physical activity:     Days per week: Swims 3x/week     Minutes per session: 25 min     Stress: Not on file   Relationships   Other Topics Concern     Not on file   Social History Narrative     Not on file     FAMILY MEDICAL HISTORY:   Family History   Problem Relation Age of Onset     Kidney failure Mother      Cystic Kidney Disease Sister    Sister (70) was recently diagnosed with PKD by MRI, with multiple bilat cyst  Brother (77) with possible PKD (with few cysts on one side only)  Sister (75) does not want to be evaluated, but used to run marathons, and still runs half-marathons.    PHYSICAL EXAM:   BP (!) 153/82   Pulse 55   Wt 73 kg (160 lb 14.4 oz)   SpO2 95%   BMI 24.46 kg/m     GENERAL APPEARANCE: alert and no distress  EYES: nonicteric  NECK: supple, no adenopathy.   RESP: lungs clear to auscultation   CV: regular rhythm, normal rate, no rub  ABDOMEN: soft, nontender, normal bowel sounds, no HSM . Kidneys not palpable .   Extremities: no edema  MS: no evidence of inflammation in joints, no muscle tenderness  SKIN: no rash  NEURO: mentation intact and speech normal  PSYCH: affect normal/bright     LABS:   CMP  Recent Labs   Lab Test 03/31/22  1245 09/30/21  1340 10/29/20  1407 07/30/20  1328 12/19/19  1416 06/13/19  1354 06/13/19  1354 04/05/19  1249 03/28/19  1456   * 141 142 138 141  --  140   < > 142   POTASSIUM 4.2 3.8 3.8 3.8 4.0  --  4.1   < > 4.0   CHLORIDE 111* 109 110* 106 109  --  109   < > 109   CO2 28 27 27 27 27  --  26   < > 27   ANIONGAP 7 5 5 5 6  --  5  --  5   * 104* 88 102* 96  --  91   < > " 106*   BUN 25 27 30 27 27  --  29   < > 31*   CR 1.22 1.29* 1.22 1.26* 1.21  --  1.28*   < > 1.17   GFRESTIMATED 62 55* 58* 56* 59*  --  56*   < > 62   GFRESTBLACK  --   --  68 65 69  --  65  --  72   JOHN 8.5 8.5 8.6 8.2* 8.8  --  8.5   < > 8.7   PHOS 2.7 2.4* 3.1 3.0 2.8   < >  --   --   --    PROTTOTAL  --   --   --   --   --   --  6.4*  --  6.7*   ALBUMIN 3.9 3.6 4.0 3.9 4.2  --  3.9   < > 3.9   BILITOTAL  --   --   --   --   --   --  0.3  --  0.3   ALKPHOS  --   --   --   --   --   --  63  --  65   AST  --   --   --   --   --   --  16  --  18   ALT  --   --   --   --   --   --  19  --  24    < > = values in this interval not displayed.     CBC  Recent Labs   Lab Test 03/31/22  1245 09/30/21  1340 10/29/20  1407 07/30/20  1328   HGB 13.7 12.9* 13.9 13.0*   WBC 6.9 4.6 6.1 5.3   RBC 4.26* 3.96* 4.31* 3.98*   HCT 40.8 37.4* 41.7 38.1*   MCV 96 94 97 96   MCH 32.2 32.6 32.3 32.7   MCHC 33.6 34.5 33.3 34.1   RDW 12.5 12.7 12.5 12.5    185 218 184       Recent Labs   Lab Test 03/31/22  1248 09/30/21  1347 10/29/20  1410 07/30/20  1331 12/19/19  1405 06/13/19  1400 03/28/19  1501   UTPG 0.16 0.16 0.12 0.12 0.11 0.11 0.10             ASSESSMENT AND PLAN:   Mr Yeager presents with PKD. Previously, we reviewed together the results of the MRI, the estimated TKV, and the estimated anticipated rate of progression of CKD given the current data. It was explained that he would most likely not benefit from Tolvaptan.    His renal function remains  Stable and well preserved.     BP:  BP remains somewhat suboptimally controlled, especially considering the history for small cerebral aneurysm.  I asked him to verify the dose of the losartan tablets he has at home, and make sure he increases the dose to 100 mg daily.  Continue amlodipine at current dose.      Cerebral aneurysm, he is due to have a repeat MRI brain soon.      Feroz Maria MD  Division of Nephrology and Hypertension  3/31/22

## 2022-03-31 NOTE — LETTER
3/31/2022     RE: Tariq Yeager  [address]  [place] [address]     Dear Colleague,    Thank you for referring your patient, Tariq Yeager, to the [facility] NEPHROLOGY CLINIC [place] at [facility]. Please see a copy of my visit note below.    Nephrology Clinic    Tariq Yeager MRN:[id number] YOB: 1945  Date of initial Service: October 29, 2020  Date of Service 3/31/2022        REASON FOR FOLLOW UP: Polycystic kidney disease    HISTORY OF PRESENT ILLNESS:  Tariq Yeager is a 73 year old male who presented initially for multiple kidney cysts, consistent with PKD.   Mr Yeager has recently been informed about the presence of bilateral renal cysts which were incidentally identified by MRI of the spine he received in early February 2018 for back pain.  The presence of bilateral renal cysts was confirmed by ultrasound also obtained recently. (2/11/19 [facility])(see below).  He denies any history of gross hematuria, flank pain, urolithiasis, or UTIs.  He does however have a history of acute sudden incerebral hemorrhage in 2002 for which he had a prolonged ICU hospitalization at [facility] .  Note is made that he also underwent placement of a permanent IVC filter, although Mr Yeager does not recall having had a DVT.  Prior to that event, it does not appear that he was in good health.  He had an inguinal herniorrhaphy on the R and reports a recurrence of the hernia + new Inguinal hernia on the L. No history of diverticulosis     June 13, 2019  Since the last visit, he had an MRI of Brain and Abd.  MRI of the brain revealed a small aneurysm for which I put in a referral to Dr STAN Robles.  Aneurysm was deemed very small and recommended repeat MRI in 2 year.  MRI of Abd revealed an estimated total kidney volume of 1627 ml, with class IB risk  category of progression of ADPKD, with an estimated projected eGFR ~ 24  "ml/min in 10 years.  He is moderating his caffeine intake, salt intake.    Still swims 3 x a week.  Feels generally well.  No HA.     December 19, 2019  Since last visit, had a cologard test that came back positive. He is scheduled to have a colonoscopy in Jan 2020.  Otherwise feels \"very well physically\" \"totally boring\"  Swims 25 min twice a week, without CP or ESCOBEDO.    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  No L Ext swelling.  No gross hematuria or  symptoms.  Rare diarrhea.  No abd pain.  No HA.  Admits to forgetting his meds on occasion (estimates he takes his meds 5 days a week)  Drinks 6 cups of coffee a day.      July 30, 2020  Since last visit, he notes doing well. No hospitalizations or significant illnesses. No problems with hematuria or dysuria.  No abd pain. Continues to swim regularly, 3-4x per week.   He continues to have chronic headaches, almost nightly, for many years. No new symptoms or change in character. He does snore quite loudly per the wife, but no witnessed apnea. Does not take Tylenol or Advil.  In terms of BP, home BP is running 130-140/80-90. Remains on amlodipine and lisinopril, good adherence.  Of note, he did have a fall while fixing some gutters, and did briefly lose consciousness. Feeling fine now without issue. Did not seek medical attention.     October 29, 2020  24 hour BP monitoring  July 30, 2020  Overall Syst  +/- 11.67  Diast BP 74+/-5.8  Pulse pressure 62+/-10.0   HR 58+/-8.9  Syst > limits 52%, Diast > limit 21%  Please see scanned document for full results  Of Note, most of the that day, he was very active doing repairs \"on a ladder\".       Has new BP machine 122-156  Most > 135  Drinks about 8 x 6 oz of coffee, has cut down to 3 cups a day.  Continues to cut down salt, paying attention to sodium content of foods.  Continues to swim 3 x a week, currently doing 14 laps + exersices,  Plus once a week aerobic exercise.    September 30, 2021  Overall feeling well. Blood " pressure control sub-optimal with systolic blood pressure ranging between 130-150. Is trying to cut down on caffeine intake. Down to 4 cups a day. No interim hospitalization or change in meds. Started taking Lisinopril 30 mg about a week ago (before this was taking 20 mg).     ROS;   Occasional mild flank pain.  Leg cramps + (trying Tonic water - has quinine, which he reports helps)  No GI symptoms.  No  symptoms,   Stream may have slowed down.  No L Ext swelling.  No Change in L inguinal hernia.        March 31, 2022  Feels generally well.  To have repeat MRI of brain next week to follow up on small aneurysm seen 2 years ago.  No GI or  symptoms  Had nocturnal pressure CP, recurrent. 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     Hypertension 2002     Inguinal hernia 2016     Presence of IVC filter 2002     PAST SURGICAL HISTORY:  Past Surgical History:   Procedure Laterality Date     cerebral aneurysm coil  2002     CRANIOTOMY, REPAIR ANEURYSM, COMBINED  2002     HERNIORRHAPHY INGUINAL Right 2016     MEDICATIONS:  Prescription Medications reviewed 3/31/2022           Current Outpatient Medications reviewed 3/31/2022   Medication Sig Dispense Refill     amLODIPine (NORVASC) 10 MG tablet Take 1 tablet (10 mg) by mouth daily 90 tablet 3     losartan (COZAAR) 50 MG tablet Take 2 tablets (100 mg) by mouth daily He is taking only 1 tablet  3       ALLERGIES:    Allergies   Allergen Reactions     Dilantin [Phenytoin] Rash     REVIEW OF SYSTEMS:  A comprehensive review of systems was performed and found to be negative except as described here or above.     SOCIAL HISTORY:   Social History     Socioeconomic History     Marital status:       "Spouse name: Not on file     Number of children: Not on file     Years of education: Not on file     Highest education level: Not on file   Occupational History     Not on file   Social Needs     Financial resource strain: Not on file     Food insecurity:     Worry: Not on file     Inability: Not on file     Transportation needs:     Medical: Not on file     Non-medical: Not on file   Tobacco Use     Smoking status: Never Smoker     Smokeless tobacco: Never Used   Substance and Sexual Activity     Alcohol use: No     Frequency: Never.  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  Remote history of use > 40 yrs ago     Sexual activity: Not on file   Lifestyle     Physical activity:     Days per week: Swims 3x/week     Minutes per session: 25 min     Stress: Not on file   Relationships   Other Topics Concern     Not on file   Social History Narrative     Not on file     FAMILY MEDICAL HISTORY:   Family History   Problem Relation Age of Onset     Kidney failure Mother      Cystic Kidney Disease Sister    Sister (70) was recently diagnosed with PKD by MRI, with multiple bilat cyst  Brother (77) with possible PKD (with few cysts on one side only)  Sister (75) does not want to be evaluated, but used to run marathons, and still runs half-marathons.    PHYSICAL EXAM:   BP (!) 153/82   Pulse 55   Wt 73 kg (160 lb 14.4 oz)   SpO2 95%   BMI 24.46 kg/m     GENERAL APPEARANCE: alert and no distress  EYES: nonicteric  NECK: supple, no adenopathy.   RESP: lungs clear to auscultation   CV: regular rhythm, normal rate, no rub  ABDOMEN: soft, nontender, normal bowel sounds, no HSM . Kidneys not palpable .   Extremities: no edema  MS: no evidence of inflammation in joints, no muscle tenderness  SKIN: no rash  NEURO: mentation intact and speech normal  PSYCH: affect normal/bright     LABS:   CMP  Recent Labs   Lab Test 03/31/22  1245 09/30/21  1340 10/29/20  1407 07/30/20  1328 12/19/19  1416 " 06/13/19  1354 06/13/19  1354 04/05/19  1249 03/28/19  1456   * 141 142 138 141  --  140   < > 142   POTASSIUM 4.2 3.8 3.8 3.8 4.0  --  4.1   < > 4.0   CHLORIDE 111* 109 110* 106 109  --  109   < > 109   CO2 28 27 27 27 27  --  26   < > 27   ANIONGAP 7 5 5 5 6  --  5  --  5   * 104* 88 102* 96  --  91   < > 106*   BUN 25 27 30 27 27  --  29   < > 31*   CR 1.22 1.29* 1.22 1.26* 1.21  --  1.28*   < > 1.17   GFRESTIMATED 62 55* 58* 56* 59*  --  56*   < > 62   GFRESTBLACK  --   --  68 65 69  --  65  --  72   JOHN 8.5 8.5 8.6 8.2* 8.8  --  8.5   < > 8.7   PHOS 2.7 2.4* 3.1 3.0 2.8   < >  --   --   --    PROTTOTAL  --   --   --   --   --   --  6.4*  --  6.7*   ALBUMIN 3.9 3.6 4.0 3.9 4.2  --  3.9   < > 3.9   BILITOTAL  --   --   --   --   --   --  0.3  --  0.3   ALKPHOS  --   --   --   --   --   --  63  --  65   AST  --   --   --   --   --   --  16  --  18   ALT  --   --   --   --   --   --  19  --  24    < > = values in this interval not displayed.     CBC  Recent Labs   Lab Test 03/31/22  1245 09/30/21  1340 10/29/20  1407 07/30/20  1328   HGB 13.7 12.9* 13.9 13.0*   WBC 6.9 4.6 6.1 5.3   RBC 4.26* 3.96* 4.31* 3.98*   HCT 40.8 37.4* 41.7 38.1*   MCV 96 94 97 96   MCH 32.2 32.6 32.3 32.7   MCHC 33.6 34.5 33.3 34.1   RDW 12.5 12.7 12.5 12.5    185 218 184       Recent Labs   Lab Test 03/31/22  1248 09/30/21  1347 10/29/20  1410 07/30/20  1331 12/19/19  1405 06/13/19  1400 03/28/19  1501   UTPG 0.16 0.16 0.12 0.12 0.11 0.11 0.10             ASSESSMENT AND PLAN:   Mr Yeager presents with PKD. Previously, we reviewed together the results of the MRI, the estimated TKV, and the estimated anticipated rate of progression of CKD given the current data. It was explained that he would most likely not benefit from Tolvaptan.    His renal function remains  Stable and well preserved.     BP:  BP remains somewhat suboptimally controlled, especially considering the history for small cerebral aneurysm.  I asked him  to verify the dose of the losartan tablets he has at home, and make sure he increases the dose to 100 mg daily.  Continue amlodipine at current dose.      Cerebral aneurysm, he is due to have a repeat MRI brain soon.      Feroz Maria MD  Division of Nephrology and Hypertension  3/31/22

## 2022-04-05 ENCOUNTER — OFFICE VISIT (OUTPATIENT)
Dept: NEUROSURGERY | Facility: CLINIC | Age: 77
End: 2022-04-05
Payer: COMMERCIAL

## 2022-04-05 ENCOUNTER — ANCILLARY PROCEDURE (OUTPATIENT)
Dept: MRI IMAGING | Facility: CLINIC | Age: 77
End: 2022-04-05
Attending: NEUROLOGICAL SURGERY
Payer: COMMERCIAL

## 2022-04-05 VITALS
HEART RATE: 59 BPM | HEIGHT: 68 IN | SYSTOLIC BLOOD PRESSURE: 185 MMHG | OXYGEN SATURATION: 97 % | RESPIRATION RATE: 16 BRPM | WEIGHT: 160 LBS | BODY MASS INDEX: 24.25 KG/M2 | DIASTOLIC BLOOD PRESSURE: 92 MMHG

## 2022-04-05 DIAGNOSIS — I67.1 CEREBRAL ANEURYSM: ICD-10-CM

## 2022-04-05 DIAGNOSIS — I67.1 CEREBRAL ANEURYSM: Primary | ICD-10-CM

## 2022-04-05 PROCEDURE — 70544 MR ANGIOGRAPHY HEAD W/O DYE: CPT | Mod: GC | Performed by: RADIOLOGY

## 2022-04-05 PROCEDURE — 99207 PR NO DOCUMENTATION ON VISIT: CPT | Performed by: NEUROLOGICAL SURGERY

## 2022-04-05 RX ORDER — SILDENAFIL CITRATE 20 MG/1
TABLET ORAL
COMMUNITY
Start: 2020-12-14

## 2022-04-05 ASSESSMENT — PAIN SCALES - GENERAL: PAINLEVEL: NO PAIN (0)

## 2022-04-05 NOTE — PATIENT INSTRUCTIONS
Follow up with Dr Travis weber 2 years with [facility] Brain prior.    Call Sarika RN  Neurosurgery Care Coordinator with questions/concerns     Thank you for using [facility]

## 2022-04-05 NOTE — LETTER
Date:May 6, 2022      Provider requested that no letter be sent. Do not send.       [facility]

## 2022-04-05 NOTE — PROGRESS NOTES
NEUROSURGERY CLINIC NOTE    HPI: Mr. Yeager is a 76 year old man with PKD with a history of right ELEAZAR s/p coiling in 2002 at [facility] presented with a routine follow up with his stable small right P1 aneurysm. Since the last visit in 2019 he denies recent fevers, chills, nausea, vomiting, chest pain, shortness of breath, and denies headaches, weakness, LOC, numbness/weakness/paresthesias in extremities, changes in sensation, taste, smell, nor trouble speaking or other neurologic symptoms.     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     Hypertension 2002     Inguinal hernia 2016     Presence of IVC filter 2002       PAST SURGICAL HISTORY:   Past Surgical History:   Procedure Laterality Date     cerebral aneurysm coil  2002     CRANIOTOMY, REPAIR ANEURYSM, COMBINED  2002     HERNIORRHAPHY INGUINAL Right 2016       SOCIAL HISTORY:   Social History     Tobacco Use     Smoking status: Never Smoker     Smokeless tobacco: Never Used   Substance Use Topics     Alcohol use: No       MEDICATIONS:  Current Outpatient Medications   Medication Sig Dispense Refill     amLODIPine (NORVASC) 10 MG tablet Take 1 tablet (10 mg) by mouth daily 90 tablet 3     losartan (COZAAR) 100 MG tablet Take 1 tablet (100 mg) by mouth daily 90 tablet 3     sildenafil (REVATIO) 20 MG tablet   See Instructions, Instructions: TAKE THREE TO FIVE TABLETS BY MOUTH 30 MINUTES PRIOR TO SEXUAL ACTIVITY, # 20 tab(s), 0 Refill(s), Pharmacy: [facility] #[id number], TAKE THREE TO FIVE TABLETS BY MOUTH 30 MINUTES PRIOR TO SEXUAL ACTIVITY, 67, in, 10/25...         Allergies:  Allergies   Allergen Reactions     Lisinopril      Other reaction(s): itching     Dilantin [Phenytoin] Rash       ROS: 10 point ROS of systems including Constitutional, Eyes, Respiratory, Cardiovascular,  "Gastroenterology, Genitourinary, Integumentary, Muscularskeletal, Psychiatric were all negative except for pertinent positives noted in my HPI.    Physical exam:   Blood pressure (!) 185/92, pulse 59, resp. rate 16, height 1.727 m (5' 7.99\"), weight 72.6 kg (160 lb), SpO2 97 %.  NEUROLOGIC:  -- Awake; Alert; oriented x 3  -- Follows commands briskly  -- +repetition, calculation, and naming  -- Speech fluent, spontaneous. No aphasia or dysarthria.  -- no gaze preference. No apparent hemineglect.  Cranial Nerves:  -- visual fields full to confrontation, PERRL 3-2mm bilat and brisk, extraocular movements intact  -- face symmetrical, tongue midline  -- sensory V1-V3 intact bilaterally  -- palate elevates symmetrically, uvula midline  -- hearing grossly intact bilat  -- Trapezii 5/5 strength bilat symmetric  -- Cerebellar: Finger nose finger without dysmetria, intact rapid alternating motions bilaterally    Motor:  Normal bulk / tone; no tremor, rigidity, or bradykinesia.  No muscle wasting or fasciculations  No Pronator Drift     Delt Bi Tri Hand Flexion/  Extension Iliopsoas Quadriceps Tibialis Anterior EHL Gastroc    C5 C6 C7 C8/T1 L2 L3 L4 L5 S1   R 5 5 5 5 5 5 5 5 5   L 5 5 5 5 5 5 5 5 5   Sensory:  intact to LT x 4 extremities       IMAGING:  MRA  Reviewed with patient and Dr. Robles showed stably small right P1   Await final radiology reads    ASSESSMENT: Mr. Yeager is a 76 year old man with PKD with a history of right ELEAAZR s/p coiling in 2002 at [facility] presented with a routine follow up with his stable small right P1 aneurysm. Doing well, no neurological changes      RECOMMENDATIONS:  - Follow up in 2 years with repeat MRA head no contrast    The patient was seen and discussed with Dr. Travis Godinez MD  Neurosurgery Resident   "

## 2022-04-05 NOTE — LETTER
4/5/2022       RE: Tariq Yeager  [address]  [place] [address]     Dear Colleague,    Thank you for referring your patient, Tariq Yeager, to the [facility] NEUROSURGERY CLINIC [place] at [facility]. Please see a copy of my visit note below.    NEUROSURGERY CLINIC NOTE    HPI: Mr. Yeager is a 76 year old man with PKD with a history of right ELEAZAR s/p coiling in 2002 at [facility] presented with a routine follow up with his stable small right P1 aneurysm. Since the last visit in 2019 he denies recent fevers, chills, nausea, vomiting, chest pain, shortness of breath, and denies headaches, weakness, LOC, numbness/weakness/paresthesias in extremities, changes in sensation, taste, smell, nor trouble speaking or other neurologic symptoms.     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     Hypertension 2002     Inguinal hernia 2016     Presence of IVC filter 2002       PAST SURGICAL HISTORY:   Past Surgical History:   Procedure Laterality Date     cerebral aneurysm coil  2002     CRANIOTOMY, REPAIR ANEURYSM, COMBINED  2002     HERNIORRHAPHY INGUINAL Right 2016       SOCIAL HISTORY:   Social History     Tobacco Use     Smoking status: Never Smoker     Smokeless tobacco: Never Used   Substance Use Topics     Alcohol use: No       MEDICATIONS:  Current Outpatient Medications   Medication Sig Dispense Refill     amLODIPine (NORVASC) 10 MG tablet Take 1 tablet (10 mg) by mouth daily 90 tablet 3     losartan (COZAAR) 100 MG tablet Take 1 tablet (100 mg) by mouth daily 90 tablet 3     sildenafil (REVATIO) 20 MG tablet   See Instructions, Instructions: TAKE THREE TO FIVE TABLETS BY MOUTH 30 MINUTES PRIOR TO SEXUAL ACTIVITY, # 20 tab(s), 0 Refill(s), Pharmacy: [facility] #[id number],  "TAKE THREE TO FIVE TABLETS BY MOUTH 30 MINUTES PRIOR TO SEXUAL ACTIVITY, 67, in, 10/25...         Allergies:  Allergies   Allergen Reactions     Lisinopril      Other reaction(s): itching     Dilantin [Phenytoin] Rash       ROS: 10 point ROS of systems including Constitutional, Eyes, Respiratory, Cardiovascular, Gastroenterology, Genitourinary, Integumentary, Muscularskeletal, Psychiatric were all negative except for pertinent positives noted in my HPI.    Physical exam:   Blood pressure (!) 185/92, pulse 59, resp. rate 16, height 1.727 m (5' 7.99\"), weight 72.6 kg (160 lb), SpO2 97 %.  NEUROLOGIC:  -- Awake; Alert; oriented x 3  -- Follows commands briskly  -- +repetition, calculation, and naming  -- Speech fluent, spontaneous. No aphasia or dysarthria.  -- no gaze preference. No apparent hemineglect.  Cranial Nerves:  -- visual fields full to confrontation, PERRL 3-2mm bilat and brisk, extraocular movements intact  -- face symmetrical, tongue midline  -- sensory V1-V3 intact bilaterally  -- palate elevates symmetrically, uvula midline  -- hearing grossly intact bilat  -- Trapezii 5/5 strength bilat symmetric  -- Cerebellar: Finger nose finger without dysmetria, intact rapid alternating motions bilaterally    Motor:  Normal bulk / tone; no tremor, rigidity, or bradykinesia.  No muscle wasting or fasciculations  No Pronator Drift     Delt Bi Tri Hand Flexion/  Extension Iliopsoas Quadriceps Tibialis Anterior EHL Gastroc    C5 C6 C7 C8/T1 L2 L3 L4 L5 S1   R 5 5 5 5 5 5 5 5 5   L 5 5 5 5 5 5 5 5 5   Sensory:  intact to LT x 4 extremities       IMAGING:  MRA  Reviewed with patient and Dr. Robles showed stably small right P1   Await final radiology reads    ASSESSMENT: Mr. Yeager is a 76 year old man with PKD with a history of right ELEAZAR s/p coiling in 2002 at [facility] presented with a routine follow up with his stable small right P1 aneurysm. Doing well, no neurological changes      RECOMMENDATIONS:  - Follow up in 2 " years with repeat MRA head no contrast    The patient was seen and discussed with Dr. Travis Godinez MD  Neurosurgery Resident       Again, thank you for allowing me to participate in the care of your patient.      Sincerely,    Lokesh Robles MD

## 2022-10-16 ENCOUNTER — HEALTH MAINTENANCE LETTER (OUTPATIENT)
Age: 77
End: 2022-10-16

## 2023-01-26 ENCOUNTER — TELEPHONE (OUTPATIENT)
Dept: NEPHROLOGY | Facility: CLINIC | Age: 78
End: 2023-01-26
Payer: COMMERCIAL

## 2023-01-26 DIAGNOSIS — N18.30 STAGE 3 CHRONIC KIDNEY DISEASE, UNSPECIFIED WHETHER STAGE 3A OR 3B CKD (H): Primary | ICD-10-CM

## 2023-01-26 LAB
CHOLESTEROL (EXTERNAL): 187 MG/DL (ref 0–200)
CREATININE (EXTERNAL): 1.1 MG/DL (ref 0.7–1.4)
GFR ESTIMATED (EXTERNAL): >60 ML/MIN/1.73M2
GLUCOSE (EXTERNAL): 94 MG/DL (ref 60–99)
HBA1C MFR BLD: 5.48 % (ref 4–5.6)
HDLC SERPL-MCNC: 54 MG/DL (ref 65–65)
LDL CHOLESTEROL CALCULATED (EXTERNAL): 120 MG/DL (ref 0–130)
POTASSIUM (EXTERNAL): 4.1 MMOL/L (ref 3.5–5.1)
TRIGLYCERIDES (EXTERNAL): 65 MG/DL (ref 0–200)

## 2023-01-26 NOTE — TELEPHONE ENCOUNTER
[facility] Call Center    Phone Message    May a detailed message be left on voicemail: yes     Reason for Call: Order(s): Other:   Reason for requested: LABS  Date needed: 7/20/2023  Provider name: Candace      Action Taken: Message routed to:  Other: Neph    Travel Screening: Not Applicable

## 2023-03-26 ENCOUNTER — HEALTH MAINTENANCE LETTER (OUTPATIENT)
Age: 78
End: 2023-03-26

## 2023-04-03 ENCOUNTER — TRANSFERRED RECORDS (OUTPATIENT)
Dept: HEALTH INFORMATION MANAGEMENT | Facility: CLINIC | Age: 78
End: 2023-04-03

## 2023-04-05 DIAGNOSIS — Q61.2 ADPKD (AUTOSOMAL DOMINANT POLYCYSTIC KIDNEY DISEASE): ICD-10-CM

## 2023-04-07 RX ORDER — AMLODIPINE BESYLATE 10 MG/1
10 TABLET ORAL DAILY
Qty: 90 TABLET | Refills: 3 | Status: SHIPPED | OUTPATIENT
Start: 2023-04-07 | End: 2024-01-18

## 2023-04-07 RX ORDER — LOSARTAN POTASSIUM 100 MG/1
100 TABLET ORAL DAILY
Qty: 90 TABLET | Refills: 3 | Status: SHIPPED | OUTPATIENT
Start: 2023-04-07 | End: 2024-01-18

## 2023-04-12 DIAGNOSIS — R97.20 ELEVATED PSA: Primary | ICD-10-CM

## 2023-04-12 DIAGNOSIS — Q61.2 ADPKD (AUTOSOMAL DOMINANT POLYCYSTIC KIDNEY DISEASE): ICD-10-CM

## 2023-04-13 ENCOUNTER — TELEPHONE (OUTPATIENT)
Dept: UROLOGY | Facility: CLINIC | Age: 78
End: 2023-04-13
Payer: COMMERCIAL

## 2023-04-13 NOTE — TELEPHONE ENCOUNTER
Health Call Center    Phone Message    May a detailed message be left on voicemail: yes     Reason for Call: Appointment Intake      Referring Provider Name: Feroz Maria    Diagnosis and/or Symptoms:   Elevated PSA    Patient being referred for Urgent Appointment (1-2 weeks) by referring provider. Writer unable to schedule patient within timeframe requested. Sending encounter message for clinic review and follow-up with patient for scheduling. Thank you!     Action Taken: Message routed to:  [facility] ([facility] Urology    Travel Screening: Not Applicable

## 2023-04-20 ENCOUNTER — TELEPHONE (OUTPATIENT)
Dept: MULTI SPECIALTY CLINIC | Facility: CLINIC | Age: 78
End: 2023-04-20
Payer: COMMERCIAL

## 2023-04-20 NOTE — TELEPHONE ENCOUNTER
Called the pt to discuss imaging scheduling and was told pt still has [employer] insurance and it is not longer accepted. Ppatient will call around to see who will be able to see the pt. Patient is currently looking for another insurance that will cover. 4.20.23 AM

## 2023-11-08 ENCOUNTER — TELEPHONE (OUTPATIENT)
Dept: NEPHROLOGY | Facility: CLINIC | Age: 78
End: 2023-11-08
Payer: COMMERCIAL

## 2023-11-08 NOTE — TELEPHONE ENCOUNTER
YEVGENIY and sent My Chart message to inform patient that appointment on 1.11.2024 has been rescheduled to 01/18/2024 with labs prior // first attempt 11.08.2023 M[facility]

## 2024-01-18 ENCOUNTER — OFFICE VISIT (OUTPATIENT)
Dept: NEPHROLOGY | Facility: CLINIC | Age: 79
End: 2024-01-18
Attending: INTERNAL MEDICINE
Payer: COMMERCIAL

## 2024-01-18 ENCOUNTER — PATIENT OUTREACH (OUTPATIENT)
Dept: NEPHROLOGY | Facility: CLINIC | Age: 79
End: 2024-01-18

## 2024-01-18 ENCOUNTER — LAB (OUTPATIENT)
Dept: LAB | Facility: CLINIC | Age: 79
End: 2024-01-18
Attending: INTERNAL MEDICINE
Payer: COMMERCIAL

## 2024-01-18 VITALS
DIASTOLIC BLOOD PRESSURE: 85 MMHG | OXYGEN SATURATION: 97 % | BODY MASS INDEX: 24.33 KG/M2 | HEART RATE: 59 BPM | SYSTOLIC BLOOD PRESSURE: 176 MMHG | WEIGHT: 160 LBS

## 2024-01-18 DIAGNOSIS — K40.20 BILATERAL INGUINAL HERNIA WITHOUT OBSTRUCTION OR GANGRENE, RECURRENCE NOT SPECIFIED: Primary | ICD-10-CM

## 2024-01-18 DIAGNOSIS — N18.30 STAGE 3 CHRONIC KIDNEY DISEASE, UNSPECIFIED WHETHER STAGE 3A OR 3B CKD (H): ICD-10-CM

## 2024-01-18 DIAGNOSIS — Q61.2 ADPKD (AUTOSOMAL DOMINANT POLYCYSTIC KIDNEY DISEASE): ICD-10-CM

## 2024-01-18 DIAGNOSIS — I12.9 HYPERTENSION, RENAL: ICD-10-CM

## 2024-01-18 LAB
ALBUMIN MFR UR ELPH: 13.3 MG/DL
ALBUMIN SERPL BCG-MCNC: 4.5 G/DL (ref 3.5–5.2)
ALBUMIN UR-MCNC: NEGATIVE MG/DL
ANION GAP SERPL CALCULATED.3IONS-SCNC: 8 MMOL/L (ref 7–15)
APPEARANCE UR: CLEAR
BILIRUB UR QL STRIP: NEGATIVE
BUN SERPL-MCNC: 26.2 MG/DL (ref 8–23)
CALCIUM SERPL-MCNC: 8.9 MG/DL (ref 8.8–10.2)
CHLORIDE SERPL-SCNC: 107 MMOL/L (ref 98–107)
COLOR UR AUTO: ABNORMAL
CREAT SERPL-MCNC: 1.26 MG/DL (ref 0.67–1.17)
CREAT UR-MCNC: 148 MG/DL
DEPRECATED HCO3 PLAS-SCNC: 26 MMOL/L (ref 22–29)
EGFRCR SERPLBLD CKD-EPI 2021: 59 ML/MIN/1.73M2
ERYTHROCYTE [DISTWIDTH] IN BLOOD BY AUTOMATED COUNT: 12.8 % (ref 10–15)
GLUCOSE SERPL-MCNC: 104 MG/DL (ref 70–99)
GLUCOSE UR STRIP-MCNC: NEGATIVE MG/DL
HCT VFR BLD AUTO: 39.9 % (ref 40–53)
HGB BLD-MCNC: 13.9 G/DL (ref 13.3–17.7)
HGB UR QL STRIP: NEGATIVE
KETONES UR STRIP-MCNC: NEGATIVE MG/DL
LEUKOCYTE ESTERASE UR QL STRIP: NEGATIVE
MCH RBC QN AUTO: 32.5 PG (ref 26.5–33)
MCHC RBC AUTO-ENTMCNC: 34.8 G/DL (ref 31.5–36.5)
MCV RBC AUTO: 93 FL (ref 78–100)
MUCOUS THREADS #/AREA URNS LPF: PRESENT /LPF
NITRATE UR QL: NEGATIVE
PH UR STRIP: 5.5 [PH] (ref 5–7)
PHOSPHATE SERPL-MCNC: 2.8 MG/DL (ref 2.5–4.5)
PLATELET # BLD AUTO: 211 10E3/UL (ref 150–450)
POTASSIUM SERPL-SCNC: 4.3 MMOL/L (ref 3.4–5.3)
PROT/CREAT 24H UR: 0.09 MG/MG CR (ref 0–0.2)
RBC # BLD AUTO: 4.28 10E6/UL (ref 4.4–5.9)
RBC URINE: 1 /HPF
SODIUM SERPL-SCNC: 141 MMOL/L (ref 135–145)
SP GR UR STRIP: 1.02 (ref 1–1.03)
SQUAMOUS EPITHELIAL: 1 /HPF
UROBILINOGEN UR STRIP-MCNC: NORMAL MG/DL
WBC # BLD AUTO: 5.6 10E3/UL (ref 4–11)
WBC URINE: 1 /HPF

## 2024-01-18 PROCEDURE — G0463 HOSPITAL OUTPT CLINIC VISIT: HCPCS | Performed by: INTERNAL MEDICINE

## 2024-01-18 PROCEDURE — 84156 ASSAY OF PROTEIN URINE: CPT | Performed by: PATHOLOGY

## 2024-01-18 PROCEDURE — 36415 COLL VENOUS BLD VENIPUNCTURE: CPT | Performed by: PATHOLOGY

## 2024-01-18 PROCEDURE — 85027 COMPLETE CBC AUTOMATED: CPT | Performed by: PATHOLOGY

## 2024-01-18 PROCEDURE — 99215 OFFICE O/P EST HI 40 MIN: CPT | Performed by: INTERNAL MEDICINE

## 2024-01-18 PROCEDURE — 80069 RENAL FUNCTION PANEL: CPT | Performed by: PATHOLOGY

## 2024-01-18 PROCEDURE — 81001 URINALYSIS AUTO W/SCOPE: CPT | Performed by: PATHOLOGY

## 2024-01-18 RX ORDER — HYDROCHLOROTHIAZIDE 25 MG/1
25 TABLET ORAL DAILY
Qty: 90 TABLET | Refills: 3 | Status: SHIPPED | OUTPATIENT
Start: 2024-01-18

## 2024-01-18 RX ORDER — AMLODIPINE BESYLATE 10 MG/1
10 TABLET ORAL DAILY
Qty: 90 TABLET | Refills: 3 | Status: SHIPPED | OUTPATIENT
Start: 2024-01-18

## 2024-01-18 RX ORDER — LOSARTAN POTASSIUM 100 MG/1
100 TABLET ORAL DAILY
Qty: 90 TABLET | Refills: 3 | Status: SHIPPED | OUTPATIENT
Start: 2024-01-18 | End: 2024-05-09

## 2024-01-18 NOTE — PROGRESS NOTES
"Nephrology Clinic    Tariq Yeager MRN:[id number] YOB: 1945  Date of initial Service: October 29, 2020  Date of Service 3/31/2022        REASON FOR FOLLOW UP: Polycystic kidney disease    HISTORY OF PRESENT ILLNESS:  Tariq Yeager is a 73 year old male who presented initially for multiple kidney cysts, consistent with PKD.   Mr Yeager has recently been informed about the presence of bilateral renal cysts which were incidentally identified by MRI of the spine he received in early February 2018 for back pain.  The presence of bilateral renal cysts was confirmed by ultrasound also obtained recently. (2/11/19 [facility])(see below).  He denies any history of gross hematuria, flank pain, urolithiasis, or UTIs.  He does however have a history of acute sudden incerebral hemorrhage in 2002 for which he had a prolonged ICU hospitalization at [facility] .  Note is made that he also underwent placement of a permanent IVC filter, although Mr Yeager does not recall having had a DVT.  Prior to that event, it does not appear that he was in good health.  He had an inguinal herniorrhaphy on the R and reports a recurrence of the hernia + new Inguinal hernia on the L. No history of diverticulosis     June 13, 2019  Since the last visit, he had an MRI of Brain and Abd.  MRI of the brain revealed a small aneurysm for which I put in a referral to Dr STAN Robles.  Aneurysm was deemed very small and recommended repeat MRI in 2 year.  MRI of Abd revealed an estimated total kidney volume of 1627 ml, with class IB risk  category of progression of ADPKD, with an estimated projected eGFR ~ 24 ml/min in 10 years.  He is moderating his caffeine intake, salt intake.    Still swims 3 x a week.  Feels generally well.  No HA.     December 19, 2019  Since last visit, had a cologard test that came back positive. He is scheduled to have a colonoscopy in Jan 2020.  Otherwise feels \"very well physically\" \"totally " "boring\"  Swims 25 min twice a week, without CP or ESCOBEDO.    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  No L Ext swelling.  No gross hematuria or  symptoms.  Rare diarrhea.  No abd pain.  No HA.  Admits to forgetting his meds on occasion (estimates he takes his meds 5 days a week)  Drinks 6 cups of coffee a day.      July 30, 2020  Since last visit, he notes doing well. No hospitalizations or significant illnesses. No problems with hematuria or dysuria.  No abd pain. Continues to swim regularly, 3-4x per week.   He continues to have chronic headaches, almost nightly, for many years. No new symptoms or change in character. He does snore quite loudly per the wife, but no witnessed apnea. Does not take Tylenol or Advil.  In terms of BP, home BP is running 130-140/80-90. Remains on amlodipine and lisinopril, good adherence.  Of note, he did have a fall while fixing some gutters, and did briefly lose consciousness. Feeling fine now without issue. Did not seek medical attention.     October 29, 2020  24 hour BP monitoring  July 30, 2020  Overall Syst  +/- 11.67  Diast BP 74+/-5.8  Pulse pressure 62+/-10.0   HR 58+/-8.9  Syst > limits 52%, Diast > limit 21%  Please see scanned document for full results  Of Note, most of the that day, he was very active doing repairs \"on a ladder\".       Has new BP machine 122-156  Most > 135  Drinks about 8 x 6 oz of coffee, has cut down to 3 cups a day.  Continues to cut down salt, paying attention to sodium content of foods.  Continues to swim 3 x a week, currently doing 14 laps + exersices,  Plus once a week aerobic exercise.    September 30, 2021  Overall feeling well. Blood pressure control sub-optimal with systolic blood pressure ranging between 130-150. Is trying to cut down on caffeine intake. Down to 4 cups a day. No interim hospitalization or change in meds. Started taking Lisinopril 30 mg about a week ago (before this was taking 20 mg).     ROS;   Occasional mild flank pain.  Leg " cramps + (trying Tonic water - has quinine, which he reports helps)  No GI symptoms.  No  symptoms,   Stream may have slowed down.  No L Ext swelling.  No Change in L inguinal hernia.        March 31, 2022  Feels generally well.  To have repeat MRI of brain next week to follow up on small aneurysm seen 2 years ago.  No GI or  symptoms  Had nocturnal pressure CP, recurrent. -> had stress echo in Nov 2021 which was normal  Taking losartan 50 mg (instead of 100)    January 18, 2024  Mr Tavares presents with no acute complaints.  He feels generally well and remains physically active.  He raised questions about his L inguinal hernia which he discussed with his PCP, but did not have evaluated by surgery.  It has caused no acute pain and is easily reducible by his description.  He then reported some RLQ localized pain superior to the inguinal ligament, without a visible or palpable bulge.   He otherwise has no HA, gross hematuria, dysuria or GI complaints.  No SOB or CP.   BP at home remains suboptimally controlled with SBP usually > 140.  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    Hypertension 2002    Inguinal hernia 2016    Presence of IVC filter 2002     PAST SURGICAL HISTORY:  Past Surgical History:   Procedure Laterality Date    cerebral aneurysm coil  2002    CRANIOTOMY, REPAIR ANEURYSM, COMBINED  2002    HERNIORRHAPHY INGUINAL Right 2016     MEDICATIONS:  Prescription Medications reviewed 3/31/2022             Current Outpatient Medications reviewed 3/31/2022   Medication Sig Dispense Refill    amLODIPine (NORVASC) 10 MG tablet Take 1 tablet (10 mg) by mouth daily 90 tablet 3    losartan (COZAAR) 50 MG tablet Take 2 tablets (100 mg) by mouth daily He is taking only 1 tablet  3       ALLERGIES:    Allergies  "  Allergen Reactions    Lisinopril      Other reaction(s): itching    Dilantin [Phenytoin] Rash     REVIEW OF SYSTEMS:  A comprehensive review of systems was performed and found to be negative except as described here or above.     SOCIAL HISTORY:   Social History     Socioeconomic History    Marital status:      Spouse name: Not on file    Number of children: Not on file    Years of education: Not on file    Highest education level: Not on file   Occupational History    Not on file   Social Needs    Financial resource strain: Not on file    Food insecurity:     Worry: Not on file     Inability: Not on file    Transportation needs:     Medical: Not on file     Non-medical: Not on file   Tobacco Use    Smoking status: Never Smoker    Smokeless tobacco: Never Used   Substance and Sexual Activity    Alcohol use: No     Frequency: Never.  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  Remote history of use > 40 yrs ago    Sexual activity: Not on file   Lifestyle    Physical activity:     Days per week: Swims 3x/week     Minutes per session: 25 min    Stress: Not on file   Relationships   Other Topics Concern    Not on file   Social History Narrative    Not on file     FAMILY MEDICAL HISTORY:   Family History   Problem Relation Age of Onset    Kidney failure Mother     Cystic Kidney Disease Sister    Sister (70) was recently diagnosed with PKD by MRI, with multiple bilat cyst  Brother (77) with possible PKD (with few cysts on one side only)  Sister (75) does not want to be evaluated, but used to run marathons, and still runs half-marathons.    PHYSICAL EXAM:   BP (!) 176/85   Pulse 59   Wt 72.6 kg (160 lb)   SpO2 97%   BMI 24.33 kg/m     GENERAL APPEARANCE: alert and no distress  EYES: nonicteric  NECK: supple, no adenopathy.   RESP: lungs clear to auscultation   CV: regular rhythm, normal rate, no rub  ABDOMEN: soft, nontender, normal bowel sounds, no HSM . Kidneys not palpable .   He " has a direct inguinal hernia on the L that is non tender and reducible.  He has point tenderness on the R side, superior to the inguinal ligament.  I could not definitely palpate a hernia nor a defect in the muscle wall.   Extremities: no edema  MS: no evidence of inflammation in joints, no muscle tenderness  SKIN: no rash  NEURO: mentation intact and speech normal  PSYCH: affect normal/bright     LABS:   CMP  Recent Labs   Lab Test 01/18/24  1209 03/31/22  1245 09/30/21  1340 10/29/20  1407 07/30/20  1328 12/19/19  1416 06/13/19  1354 06/13/19  1354 04/05/19  1249 03/28/19  1456    146* 141 142 138 141  --  140   < > 142   POTASSIUM 4.3 4.2 3.8 3.8 3.8 4.0  --  4.1   < > 4.0   CHLORIDE 107 111* 109 110* 106 109  --  109   < > 109   CO2 26 28 27 27 27 27  --  26   < > 27   ANIONGAP 8 7 5 5 5 6  --  5  --  5   * 108* 104* 88 102* 96  --  91   < > 106*   BUN 26.2* 25 27 30 27 27  --  29   < > 31*   CR 1.26* 1.22 1.29* 1.22 1.26* 1.21  --  1.28*   < > 1.17   GFRESTIMATED 59* 62 55* 58* 56* 59*  --  56*   < > 62   GFRESTBLACK  --   --   --  68 65 69  --  65  --  72   JOHN 8.9 8.5 8.5 8.6 8.2* 8.8  --  8.5   < > 8.7   PHOS 2.8 2.7 2.4* 3.1 3.0 2.8   < >  --   --   --    PROTTOTAL  --   --   --   --   --   --   --  6.4*  --  6.7*   ALBUMIN 4.5 3.9 3.6 4.0 3.9 4.2  --  3.9   < > 3.9   BILITOTAL  --   --   --   --   --   --   --  0.3  --  0.3   ALKPHOS  --   --   --   --   --   --   --  63  --  65   AST  --   --   --   --   --   --   --  16  --  18   ALT  --   --   --   --   --   --   --  19  --  24    < > = values in this interval not displayed.     CBC  Recent Labs   Lab Test 01/18/24  1209 03/31/22  1245 09/30/21  1340 10/29/20  1407   HGB 13.9 13.7 12.9* 13.9   WBC 5.6 6.9 4.6 6.1   RBC 4.28* 4.26* 3.96* 4.31*   HCT 39.9* 40.8 37.4* 41.7   MCV 93 96 94 97   MCH 32.5 32.2 32.6 32.3   MCHC 34.8 33.6 34.5 33.3   RDW 12.8 12.5 12.7 12.5    226 185 218       Recent Labs   Lab Test 03/31/22  1248  09/30/21  1347 10/29/20  1410 07/30/20  1331 12/19/19  1405 06/13/19  1400 03/28/19  1501   UTPG 0.16 0.16 0.12 0.12 0.11 0.11 0.10         MRA [place] [facility] [place] 4/5/22  History: Cerebral aneurysm, follow-up; Cerebral aneurysm.  ICD-10: Cerebral aneurysm     Comparison:  MRA 4/10/2019      Technique: Noncontrast 3D time-of-flight MRA of the head.     Findings:     Head MRA demonstrates patent major intracranial arteries. No evidence  of flow within treated anterior communicating artery aneurysm. The  anterior communicating artery appears patent. Stable right P1  posterior cerebral artery segment posteromedially oriented focal  outpouching measuring approximately 1 mm from dome to base with a 1 mm  wide neck. This appears to be an infundibulum, with vessel arising  from the apex. In addition, there is a similar additional infundibulum  near the origin of the left superior cerebellar artery. No new focal  outpouching or aneurysm.     There is no high flow vascular malformation. Fetal origin of the left  posterior cerebral artery.                                                                    Impression:   1.  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 No  new aneurysm.  2.  No definite flow seen in treated anterior communicating artery  aneurysm.     I have personally reviewed the examination and initial interpretation  and I agree with the findings.     DREAD WESTBROOK MD       CARDIAC STRESS ECHOCARDIOGRAM (11/04/2021 1:13 PM CDT)  Miscellaneous Results - CARDIAC STRESS ECHOCARDIOGRAM (11/04/2021 1:13 PM CDT)  Specimen (Source) Anatomical Location / Laterality Collection Method / Volume Collection Time Received Time         11/04/2021 1:13 PM CDT       Miscellaneous Results - CARDIAC STRESS ECHOCARDIOGRAM (11/04/2021 1:13 PM CDT)  Narrative   PROSOLV - 11/04/2021 2:43 PM CDT   Summary   1. The patient exercised for 8 minutes and  25 seconds on the  [facility]  protocol.   2. Frequent PACs at rest and with stress, PVCs at peak exercise.   3. Good functional capacity for age. No chest pain.   4. Normal left ventricular systolic function with no regional wall motion  abnormalities noted at rest.   LVEF 60%.   5. Post stress, no wall motion abnormalities seen.   LV systolic size  decreases and systolic function increases appropriately.   6. Normal stress echocardiogram.   7. Mildly dilated aortic root of 4.2 cm is incidentally noted on the  resting  images.      Report Signatures  Stress ECG Finalized by Nadine Kang on 11/4/2021 02:42 PM  Echo Finalized by Nadine Kang on 11/4/2021 02:42 PM      Miscellaneous Results - CARDIAC STRESS ECHOCARDIOGRAM (11/04/2021 1:13 PM CDT)  Procedure Note   Nadine Kang MD - 11/04/2021   Formatting of this note might be different from the original.  Summary  1. The patient exercised for 8 minutes and 25 seconds on the [place]  protocol.  2. Frequent PACs at rest and with stress, PVCs at peak exercise.  3. Good functional capacity for age. No chest pain.  4. Normal left ventricular systolic function with no regional wall motion  abnormalities noted at rest. LVEF 60%.  5. Post stress, no wall motion abnormalities seen. LV systolic size  decreases and systolic function increases appropriately.  6. Normal stress echocardiogram.  7. Mildly dilated aortic root of 4.2 cm is incidentally noted on the  resting  images.      Report Signatures  Stress ECG Finalized by Nadine Kang on 11/4/2021 02:42 PM  Echo Finalized by Nadine Kang on 11/4/2021 02:42 PM      ASSESSMENT AND PLAN:   Mr Yeager presents with PKD. Previously, we reviewed together the results of the MRI, the estimated TKV, and the estimated anticipated rate of progression of CKD given the current data. It was explained that he would most likely not benefit from Tolvaptan.    His renal function remains  Stable and well preserved.     BP:  BP  remains suboptimally controlled, especially considering the history for small cerebral aneurysm.  This is despite the full dose losartan and amlodipine. His relative bradycardia precludes the addition of a beta blocker.  I therefore prescribed hydrochlorothiazide 25 mg daily.    I placed a referral to general surgery, Dr Ware, for evaluation and treatment of L and possibly R inguinal hernia.     Cerebral aneurysm: the repeat MRI did not show a significant change in the previously observed aneurysm.      Return to clinic in 4 months    Feroz Maria MD  Division of Nephrology and Hypertension

## 2024-01-18 NOTE — LETTER
1/18/2024       RE: Tariq Yeager  [address]  [place] [address]     Dear Colleague,    Thank you for referring your patient, Tariq Yeager, to the [facility]PHR[facility] [place] at [facility]. Please see a copy of my visit note below.    Nephrology Clinic    Tariq Yeager MRN:[id number] YOB: 1945  Date of initial Service: October 29, 2020  Date of Service 3/31/2022        REASON FOR FOLLOW UP: Polycystic kidney disease    HISTORY OF PRESENT ILLNESS:  Tariq Yeager is a 73 year old male who presented initially for multiple kidney cysts, consistent with PKD.   Mr Yeager has recently been informed about the presence of bilateral renal cysts which were incidentally identified by MRI of the spine he received in early February 2018 for back pain.  The presence of bilateral renal cysts was confirmed by ultrasound also obtained recently. (2/11/19 [facility])(see below).  He denies any history of gross hematuria, flank pain, urolithiasis, or UTIs.  He does however have a history of acute sudden incerebral hemorrhage in 2002 for which he had a prolonged ICU hospitalization at [facility] .  Note is made that he also underwent placement of a permanent IVC filter, although Mr Yeager does not recall having had a DVT.  Prior to that event, it does not appear that he was in good health.  He had an inguinal herniorrhaphy on the R and reports a recurrence of the hernia + new Inguinal hernia on the L. No history of diverticulosis     June 13, 2019  Since the last visit, he had an MRI of Brain and Abd.  MRI of the brain revealed a small aneurysm for which I put in a referral to Dr STAN Robles.  Aneurysm was deemed very small and recommended repeat MRI in 2 year.  MRI of Abd revealed an estimated total kidney volume of 1627 ml, with class IB risk  category of progression of ADPKD, with an estimated projected eGFR ~ 24  "ml/min in 10 years.  He is moderating his caffeine intake, salt intake.    Still swims 3 x a week.  Feels generally well.  No HA.     December 19, 2019  Since last visit, had a cologard test that came back positive. He is scheduled to have a colonoscopy in Jan 2020.  Otherwise feels \"very well physically\" \"totally boring\"  Swims 25 min twice a week, without CP or ESCOBEDO.    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  No L Ext swelling.  No gross hematuria or  symptoms.  Rare diarrhea.  No abd pain.  No HA.  Admits to forgetting his meds on occasion (estimates he takes his meds 5 days a week)  Drinks 6 cups of coffee a day.      July 30, 2020  Since last visit, he notes doing well. No hospitalizations or significant illnesses. No problems with hematuria or dysuria.  No abd pain. Continues to swim regularly, 3-4x per week.   He continues to have chronic headaches, almost nightly, for many years. No new symptoms or change in character. He does snore quite loudly per the wife, but no witnessed apnea. Does not take Tylenol or Advil.  In terms of BP, home BP is running 130-140/80-90. Remains on amlodipine and lisinopril, good adherence.  Of note, he did have a fall while fixing some gutters, and did briefly lose consciousness. Feeling fine now without issue. Did not seek medical attention.     October 29, 2020  24 hour BP monitoring  July 30, 2020  Overall Syst  +/- 11.67  Diast BP 74+/-5.8  Pulse pressure 62+/-10.0   HR 58+/-8.9  Syst > limits 52%, Diast > limit 21%  Please see scanned document for full results  Of Note, most of the that day, he was very active doing repairs \"on a ladder\".       Has new BP machine 122-156  Most > 135  Drinks about 8 x 6 oz of coffee, has cut down to 3 cups a day.  Continues to cut down salt, paying attention to sodium content of foods.  Continues to swim 3 x a week, currently doing 14 laps + exersices,  Plus once a week aerobic exercise.    September 30, 2021  Overall feeling well. Blood " pressure control sub-optimal with systolic blood pressure ranging between 130-150. Is trying to cut down on caffeine intake. Down to 4 cups a day. No interim hospitalization or change in meds. Started taking Lisinopril 30 mg about a week ago (before this was taking 20 mg).     ROS;   Occasional mild flank pain.  Leg cramps + (trying Tonic water - has quinine, which he reports helps)  No GI symptoms.  No  symptoms,   Stream may have slowed down.  No L Ext swelling.  No Change in L inguinal hernia.        March 31, 2022  Feels generally well.  To have repeat MRI of brain next week to follow up on small aneurysm seen 2 years ago.  No GI or  symptoms  Had nocturnal pressure CP, recurrent. -> had stress echo in Nov 2021 which was normal  Taking losartan 50 mg (instead of 100)    January 18, 2024  Mr Tavares presents with no acute complaints.  He feels generally well and remains physically active.  He raised questions about his L inguinal hernia which he discussed with his PCP, but did not have evaluated by surgery.  It has caused no acute pain and is easily reducible by his description.  He then reported some RLQ localized pain superior to the inguinal ligament, without a visible or palpable bulge.   He otherwise has no HA, gross hematuria, dysuria or GI complaints.  No SOB or CP.   BP at home remains suboptimally controlled with SBP usually > 140.  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    Hypertension 2002    Inguinal hernia 2016    Presence of IVC filter 2002     PAST SURGICAL HISTORY:  Past Surgical History:   Procedure Laterality Date    cerebral aneurysm coil  2002    CRANIOTOMY, REPAIR ANEURYSM, COMBINED  2002    HERNIORRHAPHY INGUINAL Right 2016     MEDICATIONS:  Prescription Medications reviewed  "3/31/2022             Current Outpatient Medications reviewed 3/31/2022   Medication Sig Dispense Refill    amLODIPine (NORVASC) 10 MG tablet Take 1 tablet (10 mg) by mouth daily 90 tablet 3    losartan (COZAAR) 50 MG tablet Take 2 tablets (100 mg) by mouth daily He is taking only 1 tablet  3       ALLERGIES:    Allergies   Allergen Reactions    Lisinopril      Other reaction(s): itching    Dilantin [Phenytoin] Rash     REVIEW OF SYSTEMS:  A comprehensive review of systems was performed and found to be negative except as described here or above.     SOCIAL HISTORY:   Social History     Socioeconomic History    Marital status:      Spouse name: Not on file    Number of children: Not on file    Years of education: Not on file    Highest education level: Not on file   Occupational History    Not on file   Social Needs    Financial resource strain: Not on file    Food insecurity:     Worry: Not on file     Inability: Not on file    Transportation needs:     Medical: Not on file     Non-medical: Not on file   Tobacco Use    Smoking status: Never Smoker    Smokeless tobacco: Never Used   Substance and Sexual Activity    Alcohol use: No     Frequency: Never.  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  Remote history of use > 40 yrs ago    Sexual activity: Not on file   Lifestyle    Physical activity:     Days per week: Swims 3x/week     Minutes per session: 25 min    Stress: Not on file   Relationships   Other Topics Concern    Not on file   Social History Narrative    Not on file     FAMILY MEDICAL HISTORY:   Family History   Problem Relation Age of Onset    Kidney failure Mother     Cystic Kidney Disease Sister    Sister (70) was recently diagnosed with PKD by MRI, with multiple bilat cyst  Brother (77) with possible PKD (with few cysts on one side only)  Sister (75) does not want to be evaluated, but used to run marathons, and still runs half-marathons.    PHYSICAL EXAM:   BP (!) " 176/85   Pulse 59   Wt 72.6 kg (160 lb)   SpO2 97%   BMI 24.33 kg/m     GENERAL APPEARANCE: alert and no distress  EYES: nonicteric  NECK: supple, no adenopathy.   RESP: lungs clear to auscultation   CV: regular rhythm, normal rate, no rub  ABDOMEN: soft, nontender, normal bowel sounds, no HSM . Kidneys not palpable .   He has a direct inguinal hernia on the L that is non tender and reducible.  He has point tenderness on the R side, superior to the inguinal ligament.  I could not definitely palpate a hernia nor a defect in the muscle wall.   Extremities: no edema  MS: no evidence of inflammation in joints, no muscle tenderness  SKIN: no rash  NEURO: mentation intact and speech normal  PSYCH: affect normal/bright     LABS:   CMP  Recent Labs   Lab Test 01/18/24  1209 03/31/22  1245 09/30/21  1340 10/29/20  1407 07/30/20  1328 12/19/19  1416 06/13/19  1354 06/13/19  1354 04/05/19  1249 03/28/19  1456    146* 141 142 138 141  --  140   < > 142   POTASSIUM 4.3 4.2 3.8 3.8 3.8 4.0  --  4.1   < > 4.0   CHLORIDE 107 111* 109 110* 106 109  --  109   < > 109   CO2 26 28 27 27 27 27  --  26   < > 27   ANIONGAP 8 7 5 5 5 6  --  5  --  5   * 108* 104* 88 102* 96  --  91   < > 106*   BUN 26.2* 25 27 30 27 27  --  29   < > 31*   CR 1.26* 1.22 1.29* 1.22 1.26* 1.21  --  1.28*   < > 1.17   GFRESTIMATED 59* 62 55* 58* 56* 59*  --  56*   < > 62   GFRESTBLACK  --   --   --  68 65 69  --  65  --  72   JOHN 8.9 8.5 8.5 8.6 8.2* 8.8  --  8.5   < > 8.7   PHOS 2.8 2.7 2.4* 3.1 3.0 2.8   < >  --   --   --    PROTTOTAL  --   --   --   --   --   --   --  6.4*  --  6.7*   ALBUMIN 4.5 3.9 3.6 4.0 3.9 4.2  --  3.9   < > 3.9   BILITOTAL  --   --   --   --   --   --   --  0.3  --  0.3   ALKPHOS  --   --   --   --   --   --   --  63  --  65   AST  --   --   --   --   --   --   --  16  --  18   ALT  --   --   --   --   --   --   --  19  --  24    < > = values in this interval not displayed.     CBC  Recent Labs   Lab Test  01/18/24  1209 03/31/22  1245 09/30/21  1340 10/29/20  1407   HGB 13.9 13.7 12.9* 13.9   WBC 5.6 6.9 4.6 6.1   RBC 4.28* 4.26* 3.96* 4.31*   HCT 39.9* 40.8 37.4* 41.7   MCV 93 96 94 97   MCH 32.5 32.2 32.6 32.3   MCHC 34.8 33.6 34.5 33.3   RDW 12.8 12.5 12.7 12.5    226 185 218       Recent Labs   Lab Test 03/31/22  1248 09/30/21  1347 10/29/20  1410 07/30/20  1331 12/19/19  1405 06/13/19  1400 03/28/19  1501   UTPG 0.16 0.16 0.12 0.12 0.11 0.11 0.10         MRA [place] [facility] 4/5/22  History: Cerebral aneurysm, follow-up; Cerebral aneurysm.  ICD-10: Cerebral aneurysm     Comparison:  MRA 4/10/2019      Technique: Noncontrast 3D time-of-flight MRA of the head.     Findings:     Head MRA demonstrates patent major intracranial arteries. No evidence  of flow within treated anterior communicating artery aneurysm. The  anterior communicating artery appears patent. Stable right P1  posterior cerebral artery segment posteromedially oriented focal  outpouching measuring approximately 1 mm from dome to base with a 1 mm  wide neck. This appears to be an infundibulum, with vessel arising  from the apex. In addition, there is a similar additional infundibulum  near the origin of the left superior cerebellar artery. No new focal  outpouching or aneurysm.     There is no high flow vascular malformation. Fetal origin of the left  posterior cerebral artery.                                                                    Impression:   1.  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 No  new aneurysm.  2.  No definite flow seen in treated anterior communicating artery  aneurysm.     I have personally reviewed the examination and initial interpretation  and I agree with the findings.     DREAD WESTBROOK MD       CARDIAC STRESS ECHOCARDIOGRAM (11/04/2021 1:13 PM CDT)  Miscellaneous Results - CARDIAC STRESS ECHOCARDIOGRAM (11/04/2021 1:13 PM  CDT)  Specimen (Source) Anatomical Location / Laterality Collection Method / Volume Collection Time Received Time         11/04/2021 1:13 PM CDT       Miscellaneous Results - CARDIAC STRESS ECHOCARDIOGRAM (11/04/2021 1:13 PM CDT)  Narrative   PROSOLV - 11/04/2021 2:43 PM CDT   Summary   1. The patient exercised for 8 minutes and 25 seconds on the  Preston  protocol.   2. Frequent PACs at rest and with stress, PVCs at peak exercise.   3. Good functional capacity for age. No chest pain.   4. Normal left ventricular systolic function with no regional wall motion  abnormalities noted at rest.   LVEF 60%.   5. Post stress, no wall motion abnormalities seen.   LV systolic size  decreases and systolic function increases appropriately.   6. Normal stress echocardiogram.   7. Mildly dilated aortic root of 4.2 cm is incidentally noted on the  resting  images.      Report Signatures  Stress ECG Finalized by Nadine Kang on 11/4/2021 02:42 PM  Echo Finalized by Nadine Kang on 11/4/2021 02:42 PM      Miscellaneous Results - CARDIAC STRESS ECHOCARDIOGRAM (11/04/2021 1:13 PM CDT)  Procedure Note   Nadine Kang MD - 11/04/2021   Formatting of this note might be different from the original.  Summary  1. The patient exercised for 8 minutes and 25 seconds on the Preston  protocol.  2. Frequent PACs at rest and with stress, PVCs at peak exercise.  3. Good functional capacity for age. No chest pain.  4. Normal left ventricular systolic function with no regional wall motion  abnormalities noted at rest. LVEF 60%.  5. Post stress, no wall motion abnormalities seen. LV systolic size  decreases and systolic function increases appropriately.  6. Normal stress echocardiogram.  7. Mildly dilated aortic root of 4.2 cm is incidentally noted on the  resting  images.      Report Signatures  Stress ECG Finalized by Nadine Kang on 11/4/2021 02:42 PM  Echo Finalized by Nadine Kang on 11/4/2021 02:42 PM      ASSESSMENT AND PLAN:     Shobha presents with PKD. Previously, we reviewed together the results of the MRI, the estimated TKV, and the estimated anticipated rate of progression of CKD given the current data. It was explained that he would most likely not benefit from Tolvaptan.    His renal function remains  Stable and well preserved.     BP:  BP remains suboptimally controlled, especially considering the history for small cerebral aneurysm.  This is despite the full dose losartan and amlodipine. His relative bradycardia precludes the addition of a beta blocker.  I therefore prescribed hydrochlorothiazide 25 mg daily.    I placed a referral to general surgery, Dr Ware, for evaluation and treatment of L and possibly R inguinal hernia.     Cerebral aneurysm: the repeat MRI did not show a significant change in the previously observed aneurysm.      Return to clinic in 4 months    Feroz Maria MD  Division of Nephrology and Hypertension

## 2024-01-18 NOTE — PROGRESS NOTES
[facility].  Ra Ware RN, BSN, PHN  [facility] Nephrology Nurse Navigator  [phone]

## 2024-01-18 NOTE — PATIENT INSTRUCTIONS
Please start hydrochlorothiazide 25 mg once daily  I have placed a referral to see Dr Ware in surgery to evaluate the hernias.  If you develop severe pain in either groin, please go to the emergency

## 2024-01-29 NOTE — TELEPHONE ENCOUNTER
REFERRAL INFORMATION:  Referring Provider: Dr. Feroz Maria  Referring Clinic: [facility] - Nephrology  Reason for Visit/Diagnosis: Bilateral inguinal hernia without obstruction or gangrene        FUTURE VISIT INFORMATION:  Appointment Date: 2/1/2024  Appointment Time: 2:30 PM     NOTES RECORD STATUS  DETAILS   OFFICE NOTE from Referring Provider [facility]:  1/18/24, 3/31/22 - NEPH OV with Dr. Maria   OFFICE NOTE from Other Specialists In process / Care Everywhere / [facility]:  7/17/23 - NEPH OV with Dr. Stephan [facility]:  11/27/21 - GEN SURG OV with Dr. Bang  6/1/21, 4/9/19 - PCC OV with Dr. Palomo    [facility]:  4/25/19 - GEN SURG OV with Dr. Ware   [facility] DISCHARGE SUMMARY/ ED VISITS  N/A    OPERATIVE REPORT In process 2016 - Hernia Repair   ENDOSCOPY (EGD)  N/A    PERTINENT LABS Care Everywhere / Internal    PATHOLOGY REPORTS (RELATED) N/A    IMAGING (CT, MRI, US, XR)  Received [facility]:  11/27/21 - CT Abd/Pelvis     Records Requested    Facility  [facility]  Fax: [phone]   [facility]  Fax: [phone]    Outcome * 1/29/24 10:22 AM Faxed urgent request to [facility] for images and records to be sent to the clinic. - [facility]gr[facility]    * 1/29/24 2:05 PM records received from [facility] and sent to [facility] to be scanned into the chart. Images received from [facility] and attached to the patient in PACs. - Ingris    * 1/31/24 2:02 PM Messaged Rns about missing OP Note. Sanam Amado

## 2024-02-01 ENCOUNTER — OFFICE VISIT (OUTPATIENT)
Dept: SURGERY | Facility: CLINIC | Age: 79
End: 2024-02-01
Attending: INTERNAL MEDICINE
Payer: COMMERCIAL

## 2024-02-01 ENCOUNTER — PRE VISIT (OUTPATIENT)
Dept: SURGERY | Facility: CLINIC | Age: 79
End: 2024-02-01

## 2024-02-01 VITALS
HEIGHT: 68 IN | WEIGHT: 162.5 LBS | SYSTOLIC BLOOD PRESSURE: 152 MMHG | HEART RATE: 58 BPM | OXYGEN SATURATION: 98 % | DIASTOLIC BLOOD PRESSURE: 86 MMHG | BODY MASS INDEX: 24.63 KG/M2

## 2024-02-01 DIAGNOSIS — K40.90 LEFT INGUINAL HERNIA: Primary | ICD-10-CM

## 2024-02-01 PROCEDURE — 99203 OFFICE O/P NEW LOW 30 MIN: CPT | Mod: GC | Performed by: SURGERY

## 2024-02-01 ASSESSMENT — PAIN SCALES - GENERAL: PAINLEVEL: NO PAIN (0)

## 2024-02-01 NOTE — PATIENT INSTRUCTIONS
You met with Dr. Ra Ware.      Today's visit instructions:    Reminder: Surgery Requirements  Your surgery will be at [facility]- 5th Floor.  You will need to arrive 1.5  hours early.  You will need someone to drive you home (over 18 years old) and stay with you for 24 hours after the procedure.  You will need a preop physical with PCP within 30 days of surgery- closer is always better.  Stop any blood thinners, vitamins, minerals, or herbal supplements 5 days before surgery.  If you are taking a prescribed blood thinner or weight loss medication please let us know for specific instructions.  Fasting- a nurse from [facility] will call you 1-2 days before surgery to confirm your procedure and tell you when to stop eating and drinking.   Wash with the soap (Antibacterial Dial Foaming Complete , Hibiclense, or soap given/mailed from the clinic) the night before surgery and morning of surgery. See instructions in the Surgery Packet.  If you would like a procedure estimate please call Cost of Care at [phone] during the hours of 8 AM - 3 PM (option #2 for on-line request and option #3 for a representative).        If you have questions please contact Jesusita RN or Paola RN during regular clinic hours, Monday through Friday 7:30 AM - 4:00 PM, or you can contact us via [email or web address] at anytime.       If you have urgent needs after-hours, weekends, or holidays please call the hospital at [phone] and ask to speak with our on-call General Surgery Team.    Appointment [phone]  Nurse Advice (Jesusita or Paola): [phone]   Surgery Scheduler (Beth): [phone]  Fax: [phone]    After Your Open Inguinal Hernia Repair         Incision care   You may take a shower the day after surgery. Carefully wash your incision with soap and water. Do not submerge yourself in water (bath, whirlpool, hot tub, pool, lake) for 14 days after surgery.   Remove the bandage 1-2 days  after surgery but leave the medical tape (Steri-Strips) or glue in place. These will loosen and fall off on their own 1-2 weeks after surgery.    Always wash your hands before touching your incisions or removing bandages.   It is not unusual to form a collection of fluid or blood under your incision that may feel firm or squishy- it can take several weeks to months for your body to reabsorb it.  At times, it may even drain.  If that should happen keep the area clean with soap, water,  and cover with a clean gauze dressing. You can change this daily or as needed.  It is not unusual to develop swelling and/or bruising of the scrotum and penis and it can take several weeks or a month to improve.      Other medicines   Wait to start aspirin or blood thinners until the day after surgery. You can take any other regular medicines at your normal time the day after surgery.   Your pain medicine may cause constipation (hard, dry stools). To help with this, take the stool softener your doctor gave you or an over-the-counter stool softener or laxative. You can stop taking this when you are no longer taking pain medicine and your bowel movements are back to normal.      For pain or discomfort   Take the narcotic pain medicine your doctor gave you as needed and as instructed on the bottle. If you prefer to use over-the-counter medication, use acetaminophen (Tylenol) or ibuprofen (Advil, Motrin) as instructed on the box. Do not take Tylenol if it is in your narcotic pain medication.    Use an ice pack on your groin for 20 minutes at a time as needed for the first 24 hours. Be sure to protect your skin by putting a cloth between the ice pack and your skin.   After 24 hours you can switch to heat for 20 minutes as needed. Be sure to protect your skin by putting a cloth between the heat pack and your skin.    It is normal to feel a lump in your groin after surgery. This lump may take up to 8 weeks to go away.      Activities   No  driving until you feel it s safe to do so. Don t drive while taking narcotic pain medicine.   Don t lift anything heavier than 20 pounds for 3 weeks after surgery.      Special equipment   Male Patients:  We recommend that you wear scrotal support for the first 3 days after surgery; however, you can wear it longer if you wish.  We suggest using an athletic supporter (Jock Strap), snug briefs, or Spandex biker shorts.     Diet   You can eat your regular meals after surgery.     Dental Care  Please avoid dental care for two weeks prior to hernia repair and for 6 weeks after surgery due to the mesh that may be placed.     When to call the doctor   Call your doctor if you have:   A fever above 101 F (38.3 C) (taken under the tongue), or a fever or chills lasting more than a day.   Redness at the incision site.   Any fluid or blood draining from the incision, especially if it smells bad.    Severe pain that doesn t improve with pain medicine.      We will call you 2 to 4 days after surgery to review this handout, answer questions and help arrange after-surgery care. If you have questions or concerns, please call [phone] during regular office hours. If you need to call after business hours, call [phone] and ask to page the surgeon on-call.     IMPORTANT:  Prior to your surgical procedure, a nurse will be contacting you to obtain a health history.   If they do not reach you by noon the day prior to your surgery, your surgery will be cancelled. If you have your Pre-Op Surgical Physical (H&P) with the Anesthesia Team (PAC), you are exempt from this call. Phone:  [phone] ([facility]) or [phone] ([facility]).

## 2024-02-01 NOTE — NURSING NOTE
Pre and Post op Patient Education/Teaching Flowsheet  Relevant Diagnosis:  Inguinal Hernia (K40.90)  Teaching Topic:  Pre and post op teaching  Person(s) Involved in teaching:  Patient and Wife     Motivation Level:  Asks Questions:  Yes  Eager to Learn:  Yes  Cooperative:  Yes  Receptive (willing/able to accept information):  Yes  Any cultural factors/[employer]al beliefs that may influence understanding or compliance?  No    Patient/caregiver/family demonstrates understanding of the following:  Reason for the appointment, diagnosis, and treatment plan:  Yes  Patient demonstrates understanding of the following:  Pre-op bowel prep:  N/A  Post-op pain management recommendations (medications, ice compress, binder/athletic supporter (if applicable), etc.:  Yes  Inguinal hernia patients:  Post-op urinary retention- discussed signs/symptoms and visit to ER for [place] catheter placement and to stay in place for at least 48 hours:  NA  Restrictions:  Yes  Medications to take the day of surgery:  Per PCP  Blood thinner medications discussed and when to stop (if applicable):  Yes  Wound care:  Yes  Diabetes medication management (if applicable):  Per PCP  Which situations necessitate calling provider and whom to contact:  Discussed how to contact the hospital, nurse, and clinic scheduling staff if necessary      Date and time of surgery:  TBD  Location of surgery: [facility] [place]- 5th Floor  History and Physical and any other testing necessary prior to surgery:  Yes  Required time line for completion of History and Physical and any pre-op testing:  Yes  Discuss need for someone to drive patient home and stay with them for 24 hours:  Yes  Pre-op showering/scrub information with Surgical Scrub:  Yes  NPO Guidelines:  NPO per Anesthesia Guidelines    Infection Prevention: Patient demonstrates understanding of the following:  Patient instructed on hand hygiene:  Yes  Surgical procedure site care will be  taught and will be reviewed at the time of discharge  Signs and symptoms of infection taught:  Yes  Wound care reviewed and will be taught at the time of discharge  Central venous catheter care will be taught at the time of discharge (if applicable)    Post-op follow-up:  Instructional materials used/given/mailed:  Surgical logistics, post op teaching sheet, and surgical scrub

## 2024-02-01 NOTE — NURSING NOTE
"Chief Complaint   Patient presents with    New Patient     Bilateral inguinal hernia       Vitals:    02/01/24 1425   BP: (!) 152/86   BP Location: Left arm   Patient Position: Sitting   Cuff Size: Adult Regular   Pulse: 58   SpO2: 98%   Weight: 73.7 kg (162 lb 8 oz)   Height: 1.727 m (5' 8\")       Body mass index is 24.71 kg/m .                          Elroy Meneses, EMT    "

## 2024-02-01 NOTE — LETTER
2/1/2024       RE: Tariq Yeager  [address]  [place] [address]     Dear Colleague,    Thank you for referring your patient, Tariq Yeager, to the [facility] [place] at [facility]. Please see a copy of my visit note below.    GENERAL SURGERY - NEW PATIENT OFFICE VISIT      CC inguinal hernia    HPI  Tariq Yeager is a 78 year old year old year-old male with history of polycystic kidney disease, CKD, SAH/cerebral aneurysm, and DVT (has an IVC filter, not on anticoagulation) who presents with left inguinal bulge and right inguinal pain.   He had a previous open right inguinal hernia repair with mesh 30 years ago (we do not have access to these records).  The right side hasn't bothered him until approximately a week ago when he had sharp shooting pain from his right groin down his leg, this made it difficult to walk for several days.  It is now significantly improved.  He hasn't noticed any bulge on the right.  He has had a left inguinal bulge for several years which has slowly gotten bigger.  It doesn't cause pain but it does bother him and he would like it repaired.  He denies any n/v, abd pain, or changes in bowel habits.  He reports he can climb several flights of stairs without becoming SOB.  No history of bleeding or problems with anesthesia.    Previsit Tests   None available    PMH    Past Medical History:   Diagnosis Date    ADPKD (autosomal dominant polycystic kidney disease) 02/11/2019    Cerebral hemorrhage (H) 2002    Degenerative joint disease (DJD) of lumbar spine 02/06/2019    Hypertension 2002    Inguinal hernia 2016    Presence of IVC filter 2002       ETOH none  TOB none  No other drugs    PSH    Past Surgical History:   Procedure Laterality Date    cerebral aneurysm coil  2002    CRANIOTOMY, REPAIR ANEURYSM, COMBINED  2002    HERNIORRHAPHY INGUINAL Right 2016       Physical examination  BMI 24.7  Gen:  Well-appearing, interactive  HEENT: Atraumatic  Neck: trachea midline  Resp: Nonlabored breathing on RA  CV: Normal HR, wwp  Abd: nontender, nondistended  Groins: right groin with well healed oblique scar, no palpable hernia, somewhat tender firm area superior to the external ring likely previously placed mesh; left groin with nontender reducible inguinal hernia  Ext: moves all extremities equally  Neuro: No focal deficits    From a personal perspective, 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      IMPRESSION   78 year old year old year-old male with prior open right inguinal hernia repair with mesh, suspect recent pain is related to the previously placed mesh and will resolve without intervention.  He also has an enlarging reducible left inguinal hernia without obstruction.    PLAN  I spent a total of 30 minutes with Mr. Yeager and his wife, more than 50% of which were spent in counseling, coordination of care, and face-to-face time. I reviewed the plan as follows:    1) Procedure planned: open left inguinal hernia repair. I reviewed the indications, risks, and benefits of the procedure with Mr. Yeager and his wife.     Necessary Preop Testing: PCP for preoperative H&P  Regional Anesthesia Plan: MAC/Local  Anticoagulation Plan: N/A    Patient seen and discussed with staff, Dr. Ware.    Esthela Lobo  General Surgery PGY5      Attestation signed by Ra Ware MD at 2/1/2024  3:55 PM:  Staff note and  Physician Attestation     I, Ra Ware, saw this patient, discussed at length with the resident, and agree with the resident s findings and plan of care as documented in the resident s note.       I personally reviewed vital signs, medications, labs and imaging.     Key findings were: SAMMIE Ware MD    Date of Service (when I saw the patient): 2/1/24    (Patient seen, interviewed and examined, I agree with the note above and the plan as outlined.)        Again, thank  you for allowing me to participate in the care of your patient.      Sincerely,    Ra Ware MD

## 2024-02-01 NOTE — PROGRESS NOTES
GENERAL SURGERY - NEW PATIENT OFFICE VISIT      CC inguinal hernia    HPI  Tariq Yeager is a 78 year old year old year-old male with history of polycystic kidney disease, CKD, SAH/cerebral aneurysm, and DVT (has an IVC filter, not on anticoagulation) who presents with left inguinal bulge and right inguinal pain.   He had a previous open right inguinal hernia repair with mesh 30 years ago (we do not have access to these records).  The right side hasn't bothered him until approximately a week ago when he had sharp shooting pain from his right groin down his leg, this made it difficult to walk for several days.  It is now significantly improved.  He hasn't noticed any bulge on the right.  He has had a left inguinal bulge for several years which has slowly gotten bigger.  It doesn't cause pain but it does bother him and he would like it repaired.  He denies any n/v, abd pain, or changes in bowel habits.  He reports he can climb several flights of stairs without becoming SOB.  No history of bleeding or problems with anesthesia.    Previsit Tests   None available    PMH    Past Medical History:   Diagnosis Date    ADPKD (autosomal dominant polycystic kidney disease) 02/11/2019    Cerebral hemorrhage (H) 2002    Degenerative joint disease (DJD) of lumbar spine 02/06/2019    Hypertension 2002    Inguinal hernia 2016    Presence of IVC filter 2002       ETOH none  TOB none  No other drugs    PSH    Past Surgical History:   Procedure Laterality Date    cerebral aneurysm coil  2002    CRANIOTOMY, REPAIR ANEURYSM, COMBINED  2002    HERNIORRHAPHY INGUINAL Right 2016       Physical examination  BMI 24.7  Gen: Well-appearing, interactive  HEENT: Atraumatic  Neck: trachea midline  Resp: Nonlabored breathing on RA  CV: Normal HR, wwp  Abd: nontender, nondistended  Groins: right groin with well healed oblique scar, no palpable hernia, somewhat tender firm area superior to the external ring likely previously placed mesh; left  groin with nontender reducible inguinal hernia  Ext: moves all extremities equally  Neuro: No focal deficits    From a personal perspective, 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      IMPRESSION   78 year old year old year-old male with prior open right inguinal hernia repair with mesh, suspect recent pain is related to the previously placed mesh and will resolve without intervention.  He also has an enlarging reducible left inguinal hernia without obstruction.    PLAN  I spent a total of 30 minutes with Mr. Yeager and his wife, more than 50% of which were spent in counseling, coordination of care, and face-to-face time. I reviewed the plan as follows:    1) Procedure planned: open left inguinal hernia repair. I reviewed the indications, risks, and benefits of the procedure with Mr. Yeager and his wife.     Necessary Preop Testing: PCP for preoperative H&P  Regional Anesthesia Plan: MAC/Local  Anticoagulation Plan: N/A    Patient seen and discussed with staff, Dr. Ware.    Esthela Lobo  General Surgery PGY5

## 2024-02-02 ENCOUNTER — TELEPHONE (OUTPATIENT)
Dept: SURGERY | Facility: CLINIC | Age: 79
End: 2024-02-02
Payer: COMMERCIAL

## 2024-02-02 NOTE — TELEPHONE ENCOUNTER
Left voicemail for patient regarding scheduling surgery with Dr. Ware.    Provided contact number to discuss.    P: [phone]    __    Beth Wilcox, on 2/2/2024 at 10:28 AM

## 2024-02-02 NOTE — TELEPHONE ENCOUNTER
Patient is scheduled for surgery with Dr. Ware    Spoke with: Bill    Date of Surgery: 2/26/2024    Location: [facility]    Informed patient they will need an adult : Yes    Pre op with Provider n/a    H&P: Scheduled with PCP - [facility]    Additional imaging/appointments: n/a    Surgery packet: To be sent via [employer]     Additional comments: n/a        Beth Wilcox on 2/2/2024 at 11:39 AM

## 2024-02-05 ENCOUNTER — PREP FOR PROCEDURE (OUTPATIENT)
Dept: SURGERY | Facility: CLINIC | Age: 79
End: 2024-02-05
Payer: COMMERCIAL

## 2024-02-05 DIAGNOSIS — K40.90 LEFT INGUINAL HERNIA: Primary | ICD-10-CM

## 2024-02-05 RX ORDER — CEFAZOLIN SODIUM 2 G/50ML
2 SOLUTION INTRAVENOUS SEE ADMIN INSTRUCTIONS
Status: CANCELLED | OUTPATIENT
Start: 2024-02-05

## 2024-02-05 RX ORDER — CEFAZOLIN SODIUM 2 G/50ML
2 SOLUTION INTRAVENOUS
Status: CANCELLED | OUTPATIENT
Start: 2024-02-05

## 2024-02-08 PROBLEM — K40.90 LEFT INGUINAL HERNIA: Status: ACTIVE | Noted: 2024-02-05

## 2024-02-09 ENCOUNTER — TELEPHONE (OUTPATIENT)
Dept: NEUROSURGERY | Facility: CLINIC | Age: 79
End: 2024-02-09
Payer: COMMERCIAL

## 2024-02-09 DIAGNOSIS — I67.1 CEREBRAL ANEURYSM: Primary | ICD-10-CM

## 2024-02-09 NOTE — PROGRESS NOTES
Order entered for MRA per Dr. Robles note from 4/5/22,   - Follow up in 2 years with repeat MRA head no contrast   Ruthy Larson RN 2/9/2024 8:48 AM

## 2024-02-10 ENCOUNTER — TELEPHONE (OUTPATIENT)
Dept: NEUROSURGERY | Facility: CLINIC | Age: 79
End: 2024-02-10
Payer: COMMERCIAL

## 2024-02-23 ENCOUNTER — ANESTHESIA EVENT (OUTPATIENT)
Dept: SURGERY | Facility: AMBULATORY SURGERY CENTER | Age: 79
End: 2024-02-23
Payer: MEDICARE

## 2024-02-25 NOTE — ANESTHESIA PREPROCEDURE EVALUATION
Anesthesia Pre-Procedure Evaluation    Patient: Tariq Yeager   MRN: [id number] : 1945        Procedure : Procedure(s):  HERNIORRHAPHY, INGUINAL, OPEN LEFT          Past Medical History:   Diagnosis Date    ADPKD (autosomal dominant polycystic kidney disease) 2019    Cerebral hemorrhage (H)     Degenerative joint disease (DJD) of lumbar spine 2019    Hypertension     Inguinal hernia 2016    Presence of IVC filter       Past Surgical History:   Procedure Laterality Date    cerebral aneurysm coil  2002    CRANIOTOMY, REPAIR ANEURYSM, COMBINED  2002    HERNIORRHAPHY INGUINAL Right 2016      Allergies   Allergen Reactions    Lisinopril      Other reaction(s): itching    Dilantin [Phenytoin] Rash      Social History     Tobacco Use    Smoking status: Never    Smokeless tobacco: Never   Substance Use Topics    Alcohol use: No      Wt Readings from Last 1 Encounters:   24 73.7 kg (162 lb 8 oz)        Anesthesia Evaluation            ROS/MED HX  ENT/Pulmonary:       Neurologic: Comment: Hx Cerebral aneurysm      Cardiovascular:     (+)  hypertension- -   -  - -                                      METS/Exercise Tolerance:     Hematologic:     (+) History of blood clots,               Musculoskeletal:       GI/Hepatic:       Renal/Genitourinary:     (+) renal disease, type: CRI,            Endo:       Psychiatric/Substance Use:       Infectious Disease:       Malignancy:       Other:            Physical Exam    Airway  airway exam normal      Mallampati: II       Respiratory Devices and Support         Dental       (+) Minor Abnormalities - some fillings, tiny chips      Cardiovascular   cardiovascular exam normal       Rhythm and rate: regular and normal     Pulmonary   pulmonary exam normal        breath sounds clear to auscultation           OUTSIDE LABS:  CBC:   Lab Results   Component Value Date    WBC 5.6 2024    WBC 6.9 2022    HGB 13.9 2024    HGB 13.7  "03/31/2022    HCT 39.9 (L) 01/18/2024    HCT 40.8 03/31/2022     01/18/2024     03/31/2022     BMP:   Lab Results   Component Value Date     01/18/2024     (H) 03/31/2022    POTASSIUM 4.3 01/18/2024    POTASSIUM 4.2 03/31/2022    CHLORIDE 107 01/18/2024    CHLORIDE 111 (H) 03/31/2022    CO2 26 01/18/2024    CO2 28 03/31/2022    BUN 26.2 (H) 01/18/2024    BUN 25 03/31/2022    CR 1.26 (H) 01/18/2024    CR 1.22 03/31/2022     (H) 01/18/2024     (H) 03/31/2022     COAGS: No results found for: \"PTT\", \"INR\", \"FIBR\"  POC: No results found for: \"BGM\", \"HCG\", \"HCGS\"  HEPATIC:   Lab Results   Component Value Date    ALBUMIN 4.5 01/18/2024    PROTTOTAL 6.4 (L) 06/13/2019    ALT 19 06/13/2019    AST 16 06/13/2019    ALKPHOS 63 06/13/2019    BILITOTAL 0.3 06/13/2019     OTHER:   Lab Results   Component Value Date    JOHN 8.9 01/18/2024    PHOS 2.8 01/18/2024       Anesthesia Plan    ASA Status:  2    NPO Status:  NPO Appropriate    Anesthesia Type: MAC.     - Reason for MAC: straight local not clinically adequate   Induction: Intravenous.   Maintenance: TIVA.        Consents    Anesthesia Plan(s) and associated risks, benefits, and realistic alternatives discussed. Questions answered and patient/representative(s) expressed understanding.     - Discussed: Risks, Benefits and Alternatives for BOTH SEDATION and the PROCEDURE were discussed     - Discussed with:  Patient            Postoperative Care    Pain management: IV analgesics, Oral pain medications, Multi-modal analgesia.   PONV prophylaxis: Ondansetron (or other 5HT-3), Dexamethasone or Solumedrol, Background Propofol Infusion     Comments:               Jong Gale MD    I have reviewed the pertinent notes and labs in the chart from the past 30 days and (re)examined the patient.  Any updates or changes from those notes are reflected in this note.                  "

## 2024-02-26 ENCOUNTER — HOSPITAL ENCOUNTER (OUTPATIENT)
Facility: AMBULATORY SURGERY CENTER | Age: 79
Discharge: HOME | End: 2024-02-26
Attending: SURGERY
Payer: MEDICARE

## 2024-02-26 ENCOUNTER — ANESTHESIA (OUTPATIENT)
Dept: SURGERY | Facility: AMBULATORY SURGERY CENTER | Age: 79
End: 2024-02-26
Payer: MEDICARE

## 2024-02-26 VITALS
DIASTOLIC BLOOD PRESSURE: 86 MMHG | RESPIRATION RATE: 14 BRPM | OXYGEN SATURATION: 96 % | HEART RATE: 45 BPM | WEIGHT: 155 LBS | SYSTOLIC BLOOD PRESSURE: 153 MMHG | TEMPERATURE: 98.8 F | BODY MASS INDEX: 23.49 KG/M2 | HEIGHT: 68 IN

## 2024-02-26 DIAGNOSIS — K40.90 LEFT INGUINAL HERNIA: Primary | ICD-10-CM

## 2024-02-26 PROCEDURE — 99100 ANES PT EXTEME AGE<1 YR&>70: CPT | Performed by: ANESTHESIOLOGY

## 2024-02-26 PROCEDURE — 49505 PRP I/HERN INIT REDUC >5 YR: CPT | Mod: LT | Performed by: SURGERY

## 2024-02-26 PROCEDURE — 99100 ANES PT EXTEME AGE<1 YR&>70: CPT

## 2024-02-26 DEVICE — MESH HERNIA ABDOMINAL SELF GRIPPING 15X9CM PROGRIP TEM1509G: Type: IMPLANTABLE DEVICE | Site: GROIN | Status: FUNCTIONAL

## 2024-02-26 RX ORDER — HYDROMORPHONE HYDROCHLORIDE 1 MG/ML
0.2 INJECTION, SOLUTION INTRAMUSCULAR; INTRAVENOUS; SUBCUTANEOUS EVERY 5 MIN PRN
Status: DISCONTINUED | OUTPATIENT
Start: 2024-02-26 | End: 2024-02-27 | Stop reason: HOSPADM

## 2024-02-26 RX ORDER — FENTANYL CITRATE 50 UG/ML
50 INJECTION, SOLUTION INTRAMUSCULAR; INTRAVENOUS EVERY 5 MIN PRN
Status: DISCONTINUED | OUTPATIENT
Start: 2024-02-26 | End: 2024-02-27 | Stop reason: HOSPADM

## 2024-02-26 RX ORDER — ACETAMINOPHEN 500 MG
1000 TABLET ORAL ONCE
Status: DISCONTINUED | OUTPATIENT
Start: 2024-02-26 | End: 2024-02-27 | Stop reason: HOSPADM

## 2024-02-26 RX ORDER — OXYCODONE HYDROCHLORIDE 5 MG/1
10 TABLET ORAL
Status: DISCONTINUED | OUTPATIENT
Start: 2024-02-26 | End: 2024-02-27 | Stop reason: HOSPADM

## 2024-02-26 RX ORDER — CEFAZOLIN SODIUM 2 G/50ML
2 SOLUTION INTRAVENOUS
Status: COMPLETED | OUTPATIENT
Start: 2024-02-26 | End: 2024-02-26

## 2024-02-26 RX ORDER — DEXAMETHASONE SODIUM PHOSPHATE 4 MG/ML
INJECTION, SOLUTION INTRA-ARTICULAR; INTRALESIONAL; INTRAMUSCULAR; INTRAVENOUS; SOFT TISSUE PRN
Status: DISCONTINUED | OUTPATIENT
Start: 2024-02-26 | End: 2024-02-26

## 2024-02-26 RX ORDER — OXYCODONE HYDROCHLORIDE 5 MG/1
5 TABLET ORAL
Status: COMPLETED | OUTPATIENT
Start: 2024-02-26 | End: 2024-02-26

## 2024-02-26 RX ORDER — LIDOCAINE 40 MG/G
CREAM TOPICAL
Status: DISCONTINUED | OUTPATIENT
Start: 2024-02-26 | End: 2024-02-27 | Stop reason: HOSPADM

## 2024-02-26 RX ORDER — AMOXICILLIN 250 MG
1-2 CAPSULE ORAL 2 TIMES DAILY
Qty: 15 TABLET | Refills: 0 | Status: SHIPPED | OUTPATIENT
Start: 2024-02-26

## 2024-02-26 RX ORDER — SODIUM CHLORIDE, SODIUM LACTATE, POTASSIUM CHLORIDE, CALCIUM CHLORIDE 600; 310; 30; 20 MG/100ML; MG/100ML; MG/100ML; MG/100ML
INJECTION, SOLUTION INTRAVENOUS CONTINUOUS
Status: DISCONTINUED | OUTPATIENT
Start: 2024-02-26 | End: 2024-02-27 | Stop reason: HOSPADM

## 2024-02-26 RX ORDER — NALOXONE HYDROCHLORIDE 0.4 MG/ML
0.1 INJECTION, SOLUTION INTRAMUSCULAR; INTRAVENOUS; SUBCUTANEOUS
Status: DISCONTINUED | OUTPATIENT
Start: 2024-02-26 | End: 2024-02-27 | Stop reason: HOSPADM

## 2024-02-26 RX ORDER — FENTANYL CITRATE 50 UG/ML
25 INJECTION, SOLUTION INTRAMUSCULAR; INTRAVENOUS EVERY 5 MIN PRN
Status: DISCONTINUED | OUTPATIENT
Start: 2024-02-26 | End: 2024-02-27 | Stop reason: HOSPADM

## 2024-02-26 RX ORDER — HYDROMORPHONE HYDROCHLORIDE 1 MG/ML
0.4 INJECTION, SOLUTION INTRAMUSCULAR; INTRAVENOUS; SUBCUTANEOUS EVERY 5 MIN PRN
Status: DISCONTINUED | OUTPATIENT
Start: 2024-02-26 | End: 2024-02-27 | Stop reason: HOSPADM

## 2024-02-26 RX ORDER — HYDRALAZINE HYDROCHLORIDE 20 MG/ML
2.5-5 INJECTION INTRAMUSCULAR; INTRAVENOUS EVERY 10 MIN PRN
Status: DISCONTINUED | OUTPATIENT
Start: 2024-02-26 | End: 2024-02-27 | Stop reason: HOSPADM

## 2024-02-26 RX ORDER — BUPIVACAINE HYDROCHLORIDE 2.5 MG/ML
INJECTION, SOLUTION INFILTRATION; PERINEURAL PRN
Status: DISCONTINUED | OUTPATIENT
Start: 2024-02-26 | End: 2024-02-26 | Stop reason: HOSPADM

## 2024-02-26 RX ORDER — PROPOFOL 10 MG/ML
INJECTION, EMULSION INTRAVENOUS CONTINUOUS PRN
Status: DISCONTINUED | OUTPATIENT
Start: 2024-02-26 | End: 2024-02-26

## 2024-02-26 RX ORDER — LABETALOL HYDROCHLORIDE 5 MG/ML
10 INJECTION, SOLUTION INTRAVENOUS
Status: DISCONTINUED | OUTPATIENT
Start: 2024-02-26 | End: 2024-02-27 | Stop reason: HOSPADM

## 2024-02-26 RX ORDER — CEFAZOLIN SODIUM 2 G/50ML
2 SOLUTION INTRAVENOUS SEE ADMIN INSTRUCTIONS
Status: DISCONTINUED | OUTPATIENT
Start: 2024-02-26 | End: 2024-02-27 | Stop reason: HOSPADM

## 2024-02-26 RX ORDER — ACETAMINOPHEN 325 MG/1
975 TABLET ORAL ONCE
Status: COMPLETED | OUTPATIENT
Start: 2024-02-26 | End: 2024-02-26

## 2024-02-26 RX ORDER — SODIUM CHLORIDE, SODIUM LACTATE, POTASSIUM CHLORIDE, CALCIUM CHLORIDE 600; 310; 30; 20 MG/100ML; MG/100ML; MG/100ML; MG/100ML
INJECTION, SOLUTION INTRAVENOUS CONTINUOUS PRN
Status: DISCONTINUED | OUTPATIENT
Start: 2024-02-26 | End: 2024-02-26

## 2024-02-26 RX ORDER — ALBUTEROL SULFATE 0.83 MG/ML
2.5 SOLUTION RESPIRATORY (INHALATION) EVERY 4 HOURS PRN
Status: DISCONTINUED | OUTPATIENT
Start: 2024-02-26 | End: 2024-02-27 | Stop reason: HOSPADM

## 2024-02-26 RX ORDER — HYDROCODONE BITARTRATE AND ACETAMINOPHEN 5; 325 MG/1; MG/1
1-2 TABLET ORAL EVERY 4 HOURS PRN
Qty: 15 TABLET | Refills: 0 | Status: SHIPPED | OUTPATIENT
Start: 2024-02-26

## 2024-02-26 RX ORDER — MAGNESIUM HYDROXIDE 1200 MG/15ML
LIQUID ORAL PRN
Status: DISCONTINUED | OUTPATIENT
Start: 2024-02-26 | End: 2024-02-26 | Stop reason: HOSPADM

## 2024-02-26 RX ORDER — ONDANSETRON 4 MG/1
4 TABLET, ORALLY DISINTEGRATING ORAL EVERY 30 MIN PRN
Status: DISCONTINUED | OUTPATIENT
Start: 2024-02-26 | End: 2024-02-27 | Stop reason: HOSPADM

## 2024-02-26 RX ORDER — ONDANSETRON 2 MG/ML
4 INJECTION INTRAMUSCULAR; INTRAVENOUS EVERY 30 MIN PRN
Status: DISCONTINUED | OUTPATIENT
Start: 2024-02-26 | End: 2024-02-27 | Stop reason: HOSPADM

## 2024-02-26 RX ORDER — LIDOCAINE HYDROCHLORIDE 20 MG/ML
INJECTION, SOLUTION INFILTRATION; PERINEURAL PRN
Status: DISCONTINUED | OUTPATIENT
Start: 2024-02-26 | End: 2024-02-26

## 2024-02-26 RX ORDER — FENTANYL CITRATE 50 UG/ML
INJECTION, SOLUTION INTRAMUSCULAR; INTRAVENOUS PRN
Status: DISCONTINUED | OUTPATIENT
Start: 2024-02-26 | End: 2024-02-26

## 2024-02-26 RX ORDER — DIMENHYDRINATE 50 MG/ML
25 INJECTION, SOLUTION INTRAMUSCULAR; INTRAVENOUS
Status: DISCONTINUED | OUTPATIENT
Start: 2024-02-26 | End: 2024-02-27 | Stop reason: HOSPADM

## 2024-02-26 RX ORDER — ONDANSETRON 2 MG/ML
INJECTION INTRAMUSCULAR; INTRAVENOUS PRN
Status: DISCONTINUED | OUTPATIENT
Start: 2024-02-26 | End: 2024-02-26

## 2024-02-26 RX ADMIN — LIDOCAINE HYDROCHLORIDE 80 MG: 20 INJECTION, SOLUTION INFILTRATION; PERINEURAL at 09:40

## 2024-02-26 RX ADMIN — OXYCODONE HYDROCHLORIDE 5 MG: 5 TABLET ORAL at 11:19

## 2024-02-26 RX ADMIN — SODIUM CHLORIDE, SODIUM LACTATE, POTASSIUM CHLORIDE, CALCIUM CHLORIDE: 600; 310; 30; 20 INJECTION, SOLUTION INTRAVENOUS at 08:09

## 2024-02-26 RX ADMIN — ACETAMINOPHEN 975 MG: 325 TABLET ORAL at 08:09

## 2024-02-26 RX ADMIN — PROPOFOL 150 MCG/KG/MIN: 10 INJECTION, EMULSION INTRAVENOUS at 09:43

## 2024-02-26 RX ADMIN — ONDANSETRON 4 MG: 2 INJECTION INTRAMUSCULAR; INTRAVENOUS at 10:30

## 2024-02-26 RX ADMIN — SODIUM CHLORIDE, SODIUM LACTATE, POTASSIUM CHLORIDE, CALCIUM CHLORIDE: 600; 310; 30; 20 INJECTION, SOLUTION INTRAVENOUS at 09:36

## 2024-02-26 RX ADMIN — CEFAZOLIN SODIUM 2 G: 2 SOLUTION INTRAVENOUS at 09:36

## 2024-02-26 RX ADMIN — FENTANYL CITRATE 25 MCG: 50 INJECTION, SOLUTION INTRAMUSCULAR; INTRAVENOUS at 09:58

## 2024-02-26 RX ADMIN — FENTANYL CITRATE 25 MCG: 50 INJECTION, SOLUTION INTRAMUSCULAR; INTRAVENOUS at 09:49

## 2024-02-26 RX ADMIN — DEXAMETHASONE SODIUM PHOSPHATE 4 MG: 4 INJECTION, SOLUTION INTRA-ARTICULAR; INTRALESIONAL; INTRAMUSCULAR; INTRAVENOUS; SOFT TISSUE at 09:47

## 2024-02-26 NOTE — ANESTHESIA CARE TRANSFER NOTE
Patient: Tariq Yeager    Procedure: Procedure(s):  HERNIORRHAPHY, INGUINAL, OPEN LEFT       Diagnosis: Left inguinal hernia [K40.90]  Diagnosis Additional Information: No value filed.    Anesthesia Type:   MAC     Note:    Oropharynx: spontaneously breathing  Level of Consciousness: awake  Oxygen Supplementation: room air    Independent Airway: airway patency satisfactory and stable  Dentition: dentition unchanged  Vital Signs Stable: post-procedure vital signs reviewed and stable  Report to RN Given: handoff report given            Vitals:  Vitals Value Taken Time   /77    Temp 96.7    Pulse 49    Resp     SpO2 97        Electronically Signed By: IGLESIA Bloom CRNA  February 26, 2024  10:42 AM

## 2024-02-26 NOTE — ANESTHESIA POSTPROCEDURE EVALUATION
Patient: Tariq Yeager    Procedure: Procedure(s):  HERNIORRHAPHY, INGUINAL, OPEN LEFT       Anesthesia Type:  MAC    Note:  Disposition: Outpatient   Postop Pain Control: Uneventful            Sign Out: Well controlled pain   PONV: No   Neuro/Psych: Uneventful            Sign Out: Acceptable/Baseline neuro status   Airway/Respiratory: Uneventful            Sign Out: Acceptable/Baseline resp. status   CV/Hemodynamics: Uneventful            Sign Out: Acceptable CV status; No obvious hypovolemia; No obvious fluid overload   Other NRE: NONE   DID A NON-ROUTINE EVENT OCCUR?            Last vitals:  Vitals Value Taken Time   /86 02/26/24 1121   Temp 37.1  C (98.8  F) 02/26/24 1121   Pulse 45 02/26/24 1121   Resp 14 02/26/24 1121   SpO2 96 % 02/26/24 1121       Electronically Signed By: Jong Gale MD  February 26, 2024  12:47 PM

## 2024-02-26 NOTE — DISCHARGE INSTRUCTIONS
[facility]  Home Care Following Anesthesia  For 24 hours after surgery:  Get plenty of rest.  A responsible adult must stay with you for at least 24 hours after you leave the surgery center.  Do not drive or use heavy equipment.  If you have weakness or tingling, don't drive or use heavy equipment until this feeling goes away.   Do not drink alcohol.   Avoid strenuous or risky activities.  Ask for help when climbing stairs.  You may feel lightheaded.  IF so, sit for a few minutes before standing.  Have someone help you get up.   If you have nausea (feel sick to your stomach): Drink only clear liquids such as apple juice, ginger ale, broth or 7-Up.  Rest may also help.  Be sure to drink enough fluids.  Move to a regular diet as you feel able.   You may have a slight fever.  Call the doctor if your fever is over 100 F (37.7 C) (taken under the tongue) or lasts longer than 24 hours.  You may have a dry mouth, a sore throat, muscle aches or trouble sleeping. These should go away after 24 hours.  Do not make important or legal decisions.   It is recommended to avoid smoking.               Tips for taking pain medications  To get the best pain relief possible, remember these points:  Take pain medications as directed, before pain becomes severe.  Pain medication can upset your stomach: taking it with food may help.  Constipation is a common side effect of pain medication. Drink plenty of  fluids.  Eat foods high in fiber. Take a stool softener if recommended by your doctor or pharmacist.  Do not drink alcohol, drive or operate machinery while taking pain medications.  Ask about other ways to control pain, such as with heat, ice or relaxation.    Tylenol/Acetaminophen Consumption    If you feel your pain relief is insufficient, you may take Tylenol/Acetaminophen in addition to your narcotic pain medication.   Be careful not to exceed 4,000 mg of Tylenol/Acetaminophen in a 24 hour  period from all sources.  If you are taking extra strength Tylenol/acetaminophen (500 mg), the maximum dose is 8 tablets in 24 hours.  If you are taking regular strength acetaminophen (325 mg), the maximum dose is 12 tablets in 24 hours.    Call a doctor for any of the following:  Signs of infection (fever, growing tenderness at the surgery site, a large amount of drainage or bleeding, severe pain, foul-smelling drainage, redness, swelling).  It has been over 8 to 10 hours since surgery and you are still not able to urinate (pass water).  Headache for over 24 hours.  Numbness, tingling or weakness the day after surgery (if you had spinal anesthesia).  Signs of Covid-19 infection (temperature over 100 degrees, shortness of breath, cough, loss of taste/smell, generalized body aches, persistent headache, chills, sore throat, nausea/vomiting/diarrhea)  Your doctor is:  Dr. Ra Ware, General Surgery: [phone]                    Or dial [phone] and ask for the resident on call for:  General Surgery  For emergency care, call the:  [facility]:  [phone] (TTY for hearing impaired: [phone])

## 2024-02-26 NOTE — OP NOTE
Operative report    Preop diagnosis: Left inguinal hernia  Postop diagnosis: Same    Operative procedure: Open mesh repair left inguinal hernia    Surgeon: OCTAVIO Ware  Assistant: JOI Lobo  Anesthesia: IV sedation plus local infiltration of Marcaine    Indications for procedure: Patient presents with symptomatic left inguinal hernia  Full informed consent was obtain in clinic and reconfirmed in today's preoperative discussion.    Operative findings: Left inguinal direct hernia    Procedure: Patient brought to the operating room and put under IV sedation.  Left inguinal region widely prepped and draped in usual sterile fashion.  Checked with Marcaine throughout for adequate anesthetic.  Skin incision along the inguinal crease and taken down to external aponeurosis.  Field block was placed here with Marcaine.  External aponeurosis was then opened to the external ring.  The cord was then isolated with a Penrose.  The cord was exposed anteriorly noting a cord lipoma that was resected there was no indirect sac.  The indirect sac was then reduced using a pursestring of 3-0 Vicryl.  I then initiated my routine mesh repair using a 15 x 9 cm prior Lux ProGrip mesh slid out laterally passed around the cord tails crossed per my routine and inferior edges secured lateral to the newly created deep ring to inguinal ligament per routine.  The mesh distended to the midline upper the rectus muscle.  Was also secured down to the inguinal ligament more medially.  Once this was completed closed the external aponeurosis with a running Vicryl skin subcuticular Steri-Strips were applied    Estimated blood loss: Minimal  Pathology: none  The patient was taken to the recovery room where he was without difficulty or apparent complication.

## 2024-02-28 ENCOUNTER — PATIENT OUTREACH (OUTPATIENT)
Dept: SURGERY | Facility: CLINIC | Age: 79
End: 2024-02-28
Payer: COMMERCIAL

## 2024-02-28 ENCOUNTER — TELEPHONE (OUTPATIENT)
Dept: SURGERY | Facility: CLINIC | Age: 79
End: 2024-02-28
Payer: COMMERCIAL

## 2024-02-28 NOTE — TELEPHONE ENCOUNTER
[facility] Call Center    Phone Message    May a detailed message be left on voicemail: yes     Reason for Call: Other: Pt is returning call from clinic today for a status update and to review post-op teachings. No answer on priority line. Please review and call pt back to discuss. Thank you!     Action Taken: Message routed to:  [facility] ([facility]IN[facility] GEN [facility] NURSES-[facility]    Travel Screening: Not Applicable

## 2024-02-28 NOTE — PROGRESS NOTES
[facility] Call Center    Phone Message    May a detailed message be left on voicemail: yes     Reason for Call: Other: Pt returned call to clinic. No answer on priority line. Please review and call pt back. Thank you!     Action Taken: Message routed to:  [facility] SURGERY CLINIC GEN S NURSES-    Travel Screening: Not Applicable

## 2024-02-28 NOTE — PROGRESS NOTES
02/28/24    9:09 AM     Tariq Yeager is a patient of Dr. Ra Ware that underwent open inguinal hernia repair approximately 2 days ago (2/26).  Attempted to contact patient via telephone for a status update and review post-op  teaching.  LM on VM to call office.  Await return call.      Of note:  Wound:  Steri-strips  Follow-up:  Routine  Restrictions:  - No lifting, pushing, pulling more than 15-20 pounds for 3-4 weeks  New medications:  Norco, Senokot S  Equipment/Supplies:  Athletic Supporter

## 2024-02-29 NOTE — PROGRESS NOTES
RN Post-Op/Post-Discharge Care Coordination Note    Spoke with Patient.    Support  Patient able to care for self independently.     Health Status  Nausea/Vomiting: Patient reports feeling nauseated last night. No vomiting.   Eating/drinking: Patient is able to eat and drink without any complaints.  Bowel habits: Patient reports having a normal bowel movement.  Fevers/chills: Patient denies any fever or chills.  Incisions: Patient denies any signs and symptoms of infection. Wound closure: Steri-strips  Pain: tolerable with the use of OTC Tylenol per package instructions and use of ice for 20min intervals protecting skin from injury. His penis is bruised. He will use heat for 20min intervals and elevate his lower body 3-4x/day.   New Medications:  Norco, Senna    Activity/Restrictions  No lifting in excess of 15-20 pounds for 3-4 weeks    Equipment  Athletic Supporter    Pathology reviewed with patient:  N/A    Forms/Letters  No    All of his questions were answered including reviewing restrictions and wound care. He will call this office if he has any further questions and/or concerns.      In lieu of a post-op clinic patient that patient would like to be contacted in approximately 7-10 days via telephone.    A copy of this note was routed to the primary surgeon.    Whom and When to Call  Patient acknowledges understanding of how to manage any medication changes and when to seek medical care.     Patient advised that if after hour medical concerns arise to please call [phone] and choose option 4 to speak to the physician on call.

## 2024-03-07 ENCOUNTER — PATIENT OUTREACH (OUTPATIENT)
Dept: SURGERY | Facility: CLINIC | Age: 79
End: 2024-03-07
Payer: COMMERCIAL

## 2024-03-07 NOTE — PROGRESS NOTES
Tariq BOOKER Shobha was contacted several days post procedure via telephone for a status update and elected to have a telephone follow-up call approximately 7-10 days after original contact in lieu of a clinic visit with Dr. Ra Ware.  He continues to do well and the steri-strips  have peeled off.  The patients wounds are healed and the area is C/D/I.  He is afebrile, pain free, and prieto po; the patient is slowly resuming his normal activity. Discussed restrictions including no lifting in excess of 15 pounds for 2 more weeks.    Pathology was reviewed with the patient: N/A    All of Tariq Andrademann question's were answered and  he would like to return to the clinic on a PRN basis. The patient is aware that  he may schedule a post op appointment at any time.    A copy of this note was routed to the patients surgeon.

## 2024-04-04 ENCOUNTER — TELEPHONE (OUTPATIENT)
Dept: NEUROSURGERY | Facility: CLINIC | Age: 79
End: 2024-04-04
Payer: COMMERCIAL

## 2024-04-16 ENCOUNTER — ANCILLARY PROCEDURE (OUTPATIENT)
Dept: MRI IMAGING | Facility: CLINIC | Age: 79
End: 2024-04-16
Attending: NEUROLOGICAL SURGERY
Payer: COMMERCIAL

## 2024-04-16 DIAGNOSIS — I67.1 CEREBRAL ANEURYSM: ICD-10-CM

## 2024-04-16 PROCEDURE — 70544 MR ANGIOGRAPHY HEAD W/O DYE: CPT | Mod: GC | Performed by: RADIOLOGY

## 2024-04-19 ENCOUNTER — TELEPHONE (OUTPATIENT)
Dept: NEUROSURGERY | Facility: CLINIC | Age: 79
End: 2024-04-19
Payer: COMMERCIAL

## 2024-04-30 ENCOUNTER — VIRTUAL VISIT (OUTPATIENT)
Dept: NEUROSURGERY | Facility: CLINIC | Age: 79
End: 2024-04-30
Payer: COMMERCIAL

## 2024-04-30 VITALS — WEIGHT: 155 LBS | HEIGHT: 68 IN | BODY MASS INDEX: 23.49 KG/M2

## 2024-04-30 DIAGNOSIS — I67.1 BRAIN ANEURYSM: Primary | ICD-10-CM

## 2024-04-30 PROCEDURE — 99441 PR PHYSICIAN TELEPHONE EVALUATION 5-10 MIN: CPT | Mod: 93 | Performed by: NEUROLOGICAL SURGERY

## 2024-04-30 ASSESSMENT — PAIN SCALES - GENERAL: PAINLEVEL: NO PAIN (0)

## 2024-04-30 NOTE — PATIENT INSTRUCTIONS
Follow-up with Dr. Robles in 5 years with an MRA prior to your appointment.    Stroke & Endovascular RN Care Coordinators:    Ruthy Larson, RN, BSN  Kelsey Berrios, RN, CNRN, SCRN    If you have any questions please contact the RN Care Coordinators at [phone], option 1.     After business hours call the  at [phone] and have the Neuro-Interventional Fellow paged.    Thank you for choosing [facility] for your health care needs.

## 2024-04-30 NOTE — NURSING NOTE
Is the patient currently in the state of [place]? YES    Visit mode:VIDEO    If the visit is dropped, the patient can be reconnected by: TELEPHONE VISIT: Phone number: [phone]    Will anyone else be joining the visit? NO  (If patient encounters technical issues they should call [phone]    How would you like to obtain your AVS? MyChart    Are changes needed to the allergy or medication list? Pt stated no changes to allergies and Pt stated no med changes    Are refills needed on medications prescribed by this physician? NO    Reason for visit: ZARINA MANZANOF

## 2024-04-30 NOTE — PROGRESS NOTES
Virtual Visit Details    Type of service:  Telephone Visit     I had the pleasure to talk to Avi today by telephone during a neurosurgical phone visit.    Briefly Avi is a 78-year-old gentleman with a previous ruptured aneurysm that was treated.    He had presented to me for follow-up.  He was found to have a 1 mm aneurysm versus infundibulum in both the P1 segment and also MCA.  I have been following this with serial MR angiograms every 2 years.    Recent MR angiogram is consistent with the previous studies without any change.  At this point he is wondering if he can move this out to every 5 years.  I think that that would be reasonable.  I would recommend an MR angiogram in 5 years and then follow-up with me at that point in time.

## 2024-04-30 NOTE — LETTER
4/30/2024       RE: Tariq Yeager  [address]  [place] [address]       Dear Colleague,    Thank you for referring your patient, Tariq Yeager, to the [facility] NEUROSURGERY CLINIC [place] at [facility]. Please see a copy of my visit note below.        I had the pleasure to talk to Avi today by telephone during a neurosurgical phone visit.    Briefly Avi is a 78-year-old gentleman with a previous ruptured aneurysm that was treated.    He had presented to me for follow-up.  He was found to have a 1 mm aneurysm versus infundibulum in both the P1 segment and also MCA.  I have been following this with serial MR angiograms every 2 years.    Recent MR angiogram is consistent with the previous studies without any change.  At this point he is wondering if he can move this out to every 5 years.  I think that that would be reasonable.  I would recommend an MR angiogram in 5 years and then follow-up with me at that point in time.      Again, thank you for allowing me to participate in the care of your patient.      Sincerely,    Lokesh Robles MD

## 2024-05-06 ENCOUNTER — TELEPHONE (OUTPATIENT)
Dept: SURGERY | Facility: CLINIC | Age: 79
End: 2024-05-06
Payer: COMMERCIAL

## 2024-05-06 NOTE — TELEPHONE ENCOUNTER
This writer received a voicemail from this patient requesting a post op appointment with Dr. Ware with some pain after surgery. This writer is forwarding to RNCC's to call and schedule. Patient may have clinical questions.    Beth Wilcox on 5/6/2024 at 10:49 AM

## 2024-05-09 ENCOUNTER — OFFICE VISIT (OUTPATIENT)
Dept: NEPHROLOGY | Facility: CLINIC | Age: 79
End: 2024-05-09
Attending: INTERNAL MEDICINE
Payer: MEDICARE

## 2024-05-09 ENCOUNTER — OFFICE VISIT (OUTPATIENT)
Dept: SURGERY | Facility: CLINIC | Age: 79
End: 2024-05-09
Payer: COMMERCIAL

## 2024-05-09 ENCOUNTER — LAB (OUTPATIENT)
Dept: LAB | Facility: CLINIC | Age: 79
End: 2024-05-09
Payer: COMMERCIAL

## 2024-05-09 VITALS
HEART RATE: 58 BPM | BODY MASS INDEX: 23.87 KG/M2 | WEIGHT: 157 LBS | SYSTOLIC BLOOD PRESSURE: 150 MMHG | TEMPERATURE: 98.2 F | OXYGEN SATURATION: 98 % | DIASTOLIC BLOOD PRESSURE: 79 MMHG

## 2024-05-09 VITALS
HEART RATE: 58 BPM | OXYGEN SATURATION: 98 % | SYSTOLIC BLOOD PRESSURE: 150 MMHG | BODY MASS INDEX: 23.87 KG/M2 | HEIGHT: 68 IN | DIASTOLIC BLOOD PRESSURE: 79 MMHG

## 2024-05-09 DIAGNOSIS — R52 PAIN: Primary | ICD-10-CM

## 2024-05-09 DIAGNOSIS — Z98.890 POSTOPERATIVE STATE: ICD-10-CM

## 2024-05-09 DIAGNOSIS — Q61.2 ADPKD (AUTOSOMAL DOMINANT POLYCYSTIC KIDNEY DISEASE): ICD-10-CM

## 2024-05-09 DIAGNOSIS — I12.9 HYPERTENSION, RENAL: Primary | ICD-10-CM

## 2024-05-09 LAB
ALBUMIN MFR UR ELPH: 7.3 MG/DL
ALBUMIN SERPL BCG-MCNC: 4.5 G/DL (ref 3.5–5.2)
ALBUMIN UR-MCNC: NEGATIVE MG/DL
ANION GAP SERPL CALCULATED.3IONS-SCNC: 9 MMOL/L (ref 7–15)
APPEARANCE UR: CLEAR
BASOPHILS # BLD AUTO: 0 10E3/UL (ref 0–0.2)
BASOPHILS NFR BLD AUTO: 1 %
BILIRUB UR QL STRIP: NEGATIVE
BUN SERPL-MCNC: 26.8 MG/DL (ref 8–23)
CALCIUM SERPL-MCNC: 8.9 MG/DL (ref 8.8–10.2)
CHLORIDE SERPL-SCNC: 108 MMOL/L (ref 98–107)
COLOR UR AUTO: ABNORMAL
CREAT SERPL-MCNC: 1.25 MG/DL (ref 0.67–1.17)
CREAT UR-MCNC: 67.7 MG/DL
DEPRECATED HCO3 PLAS-SCNC: 23 MMOL/L (ref 22–29)
EGFRCR SERPLBLD CKD-EPI 2021: 60 ML/MIN/1.73M2
EOSINOPHIL # BLD AUTO: 0.1 10E3/UL (ref 0–0.7)
EOSINOPHIL NFR BLD AUTO: 1 %
ERYTHROCYTE [DISTWIDTH] IN BLOOD BY AUTOMATED COUNT: 12.6 % (ref 10–15)
GLUCOSE SERPL-MCNC: 99 MG/DL (ref 70–99)
GLUCOSE UR STRIP-MCNC: NEGATIVE MG/DL
HCT VFR BLD AUTO: 39.6 % (ref 40–53)
HGB BLD-MCNC: 13.7 G/DL (ref 13.3–17.7)
HGB UR QL STRIP: NEGATIVE
IMM GRANULOCYTES # BLD: 0 10E3/UL
IMM GRANULOCYTES NFR BLD: 0 %
KETONES UR STRIP-MCNC: NEGATIVE MG/DL
LEUKOCYTE ESTERASE UR QL STRIP: NEGATIVE
LYMPHOCYTES # BLD AUTO: 1.4 10E3/UL (ref 0.8–5.3)
LYMPHOCYTES NFR BLD AUTO: 25 %
MCH RBC QN AUTO: 33 PG (ref 26.5–33)
MCHC RBC AUTO-ENTMCNC: 34.6 G/DL (ref 31.5–36.5)
MCV RBC AUTO: 95 FL (ref 78–100)
MONOCYTES # BLD AUTO: 0.7 10E3/UL (ref 0–1.3)
MONOCYTES NFR BLD AUTO: 12 %
MUCOUS THREADS #/AREA URNS LPF: PRESENT /LPF
NEUTROPHILS # BLD AUTO: 3.4 10E3/UL (ref 1.6–8.3)
NEUTROPHILS NFR BLD AUTO: 61 %
NITRATE UR QL: NEGATIVE
NRBC # BLD AUTO: 0 10E3/UL
NRBC BLD AUTO-RTO: 0 /100
PH UR STRIP: 5.5 [PH] (ref 5–7)
PHOSPHATE SERPL-MCNC: 2.5 MG/DL (ref 2.5–4.5)
PLATELET # BLD AUTO: 202 10E3/UL (ref 150–450)
POTASSIUM SERPL-SCNC: 4.3 MMOL/L (ref 3.4–5.3)
PROT/CREAT 24H UR: 0.11 MG/MG CR (ref 0–0.2)
RBC # BLD AUTO: 4.15 10E6/UL (ref 4.4–5.9)
RBC URINE: 1 /HPF
SODIUM SERPL-SCNC: 140 MMOL/L (ref 135–145)
SP GR UR STRIP: 1.01 (ref 1–1.03)
UROBILINOGEN UR STRIP-MCNC: NORMAL MG/DL
WBC # BLD AUTO: 5.6 10E3/UL (ref 4–11)
WBC URINE: 1 /HPF

## 2024-05-09 PROCEDURE — 84156 ASSAY OF PROTEIN URINE: CPT | Performed by: INTERNAL MEDICINE

## 2024-05-09 PROCEDURE — 80069 RENAL FUNCTION PANEL: CPT | Performed by: INTERNAL MEDICINE

## 2024-05-09 PROCEDURE — G0463 HOSPITAL OUTPT CLINIC VISIT: HCPCS | Performed by: INTERNAL MEDICINE

## 2024-05-09 PROCEDURE — 99024 POSTOP FOLLOW-UP VISIT: CPT | Performed by: SURGERY

## 2024-05-09 PROCEDURE — 99000 SPECIMEN HANDLING OFFICE-LAB: CPT | Performed by: PATHOLOGY

## 2024-05-09 PROCEDURE — 36415 COLL VENOUS BLD VENIPUNCTURE: CPT | Performed by: PATHOLOGY

## 2024-05-09 PROCEDURE — 81001 URINALYSIS AUTO W/SCOPE: CPT | Performed by: PATHOLOGY

## 2024-05-09 PROCEDURE — 99214 OFFICE O/P EST MOD 30 MIN: CPT | Mod: 24 | Performed by: INTERNAL MEDICINE

## 2024-05-09 PROCEDURE — 85025 COMPLETE CBC W/AUTO DIFF WBC: CPT | Performed by: PATHOLOGY

## 2024-05-09 RX ORDER — LISINOPRIL 40 MG/1
40 TABLET ORAL DAILY
Qty: 90 TABLET | Refills: 3 | Status: SHIPPED | OUTPATIENT
Start: 2024-05-09

## 2024-05-09 ASSESSMENT — PAIN SCALES - GENERAL: PAINLEVEL: NO PAIN (0)

## 2024-05-09 NOTE — LETTER
5/9/2024       RE: Tariq Yeager  [address]  [place] [address]       Dear Colleague,    Thank you for referring your patient, Tariq Yeager, to the [facility] GENERAL SURGERY CLINIC [place] at [facility]. Please see a copy of my visit note below.    Tariq Yeager is status post:  2/26/2024  Herniorrhaphy inguinal (Left) Procedure: HERNIORRHAPHY, INGUINAL, OPEN LEFT;  Surgeon: Ra Ware MD;  Location: [facility] OR    Surgery without complications.  Post-op course without complications.  Here today because over last month has had persistent pain over the supra pubic region. No bulges or symptoms of recurrence.  No change in urinary or bowel habits.  On exam today:  Mildly tender over pubis without mass. Inguinal canals are both esamines and show no recurrence.  Impression: suspect could be scar from mesh.  Options include PT increased stretching vs CT to evaluate more completely.    Plan: CT abdomen pelvis  I will call with results.        Again, thank you for allowing me to participate in the care of your patient.      Sincerely,    Ra Ware MD

## 2024-05-09 NOTE — LETTER
5/9/2024       RE: Tariq Yeager  [address]  [place] [address]     Dear Colleague,    Thank you for referring your patient, Tariq Yeager, to the [facility] NEPHROLOGY CLINIC [place] at [facility]. Please see a copy of my visit note below.    Nephrology Clinic    Tariq Yeager MRN:[id number] YOB: 1945  Date of initial Service: October 29, 2020  Date of Service 3/31/2022        REASON FOR FOLLOW UP: Polycystic kidney disease    HISTORY OF PRESENT ILLNESS:  Tariq Yeager is a 73 year old male who presented initially for multiple kidney cysts, consistent with PKD.   Mr Yeager has recently been informed about the presence of bilateral renal cysts which were incidentally identified by MRI of the spine he received in early February 2018 for back pain.  The presence of bilateral renal cysts was confirmed by ultrasound also obtained recently. (2/11/19 [facility])(see below).  He denies any history of gross hematuria, flank pain, urolithiasis, or UTIs.  He does however have a history of acute sudden incerebral hemorrhage in 2002 for which he had a prolonged ICU hospitalization at [facility] .  Note is made that he also underwent placement of a permanent IVC filter, although Mr Yeager does not recall having had a DVT.  Prior to that event, it does not appear that he was in good health.  He had an inguinal herniorrhaphy on the R and reports a recurrence of the hernia + new Inguinal hernia on the L. No history of diverticulosis     June 13, 2019  Since the last visit, he had an MRI of Brain and Abd.  MRI of the brain revealed a small aneurysm for which I put in a referral to Dr STAN Robles.  Aneurysm was deemed very small and recommended repeat MRI in 2 year.  MRI of Abd revealed an estimated total kidney volume of 1627 ml, with class IB risk  category of progression of ADPKD, with an estimated projected eGFR ~ 24  "ml/min in 10 years.  He is moderating his caffeine intake, salt intake.    Still swims 3 x a week.  Feels generally well.  No HA.     December 19, 2019  Since last visit, had a cologard test that came back positive. He is scheduled to have a colonoscopy in Jan 2020.  Otherwise feels \"very well physically\" \"totally boring\"  Swims 25 min twice a week, without CP or ESCOBEDO.    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  No L Ext swelling.  No gross hematuria or  symptoms.  Rare diarrhea.  No abd pain.  No HA.  Admits to forgetting his meds on occasion (estimates he takes his meds 5 days a week)  Drinks 6 cups of coffee a day.      July 30, 2020  Since last visit, he notes doing well. No hospitalizations or significant illnesses. No problems with hematuria or dysuria.  No abd pain. Continues to swim regularly, 3-4x per week.   He continues to have chronic headaches, almost nightly, for many years. No new symptoms or change in character. He does snore quite loudly per the wife, but no witnessed apnea. Does not take Tylenol or Advil.  In terms of BP, home BP is running 130-140/80-90. Remains on amlodipine and lisinopril, good adherence.  Of note, he did have a fall while fixing some gutters, and did briefly lose consciousness. Feeling fine now without issue. Did not seek medical attention.     October 29, 2020  24 hour BP monitoring  July 30, 2020  Overall Syst  +/- 11.67  Diast BP 74+/-5.8  Pulse pressure 62+/-10.0   HR 58+/-8.9  Syst > limits 52%, Diast > limit 21%  Please see scanned document for full results  Of Note, most of the that day, he was very active doing repairs \"on a ladder\".       Has new BP machine 122-156  Most > 135  Drinks about 8 x 6 oz of coffee, has cut down to 3 cups a day.  Continues to cut down salt, paying attention to sodium content of foods.  Continues to swim 3 x a week, currently doing 14 laps + exersices,  Plus once a week aerobic exercise.    September 30, 2021  Overall feeling well. Blood " pressure control sub-optimal with systolic blood pressure ranging between 130-150. Is trying to cut down on caffeine intake. Down to 4 cups a day. No interim hospitalization or change in meds. Started taking Lisinopril 30 mg about a week ago (before this was taking 20 mg).     ROS;   Occasional mild flank pain.  Leg cramps + (trying Tonic water - has quinine, which he reports helps)  No GI symptoms.  No  symptoms,   Stream may have slowed down.  No L Ext swelling.  No Change in L inguinal hernia.        March 31, 2022  Feels generally well.  To have repeat MRI of brain next week to follow up on small aneurysm seen 2 years ago.  No GI or  symptoms  Had nocturnal pressure CP, recurrent. -> had stress echo in Nov 2021 which was normal  Taking losartan 50 mg (instead of 100)    January 18, 2024  Mr Tavares presents with no acute complaints.  He feels generally well and remains physically active.  He raised questions about his L inguinal hernia which he discussed with his PCP, but did not have evaluated by surgery.  It has caused no acute pain and is easily reducible by his description.  He then reported some RLQ localized pain superior to the inguinal ligament, without a visible or palpable bulge.   He otherwise has no HA, gross hematuria, dysuria or GI complaints.  No SOB or CP.   BP at home remains suboptimally controlled with SBP usually > 140.  No L Ext swelling      May 9, 2024  Had L inguinal hernia repair Feb 29  Has suprapubic discomfort and saw Dr Ware today.  Feels that BP is not as good on Losartan, would like to go back to lisinopril.  At home -163.  He does not want to take hydrochlorothiazide   No CP, SOB, flank pain  Does have pain in right inguinal area as well.  No L Ext swelling.  No gross hematuria.      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    Hypertension 2002    Inguinal hernia 2016    Presence of IVC filter 2002     PAST SURGICAL HISTORY:  Past Surgical History:   Procedure Laterality Date    cerebral aneurysm coil  2002    CRANIOTOMY, REPAIR ANEURYSM, COMBINED  2002    HERNIORRHAPHY INGUINAL Right 2016    HERNIORRHAPHY INGUINAL Left 2/26/2024    Procedure: HERNIORRHAPHY, INGUINAL, OPEN LEFT;  Surgeon: Ra Ware MD;  [place] [facility] [place]     MEDICATIONS:  Current Outpatient Medications   Medication Sig Dispense Refill    amLODIPine (NORVASC) 10 MG tablet Take 1 tablet (10 mg) by mouth daily 90 tablet 3    losartan (COZAAR) 100 MG tablet Take 1 tablet (100 mg) by mouth daily 90 tablet 3    sildenafil (REVATIO) 20 MG tablet   See Instructions, Instructions: TAKE THREE TO FIVE TABLETS BY MOUTH 30 MINUTES PRIOR TO SEXUAL ACTIVITY, # 20 tab(s), 0 Refill(s), Pharmacy: [id number], TAKE THREE TO FIVE TABLETS BY MOUTH 30 MINUTES PRIOR TO SEXUAL ACTIVITY, 67, in, 10/25...      hydrochlorothiazide (HYDRODIURIL) 25 MG tablet Take 1 tablet (25 mg) by mouth daily (Patient not taking: Reported on 5/9/2024) 90 tablet 3    HYDROcodone-acetaminophen (NORCO) 5-325 MG tablet Take 1-2 tablets by mouth every 4 hours as needed for moderate to severe pain 15 tablet 0    senna-docusate (SENOKOT-S/PERICOLACE) 8.6-50 MG tablet Take 1-2 tablets by mouth 2 times daily 15 tablet 0         ALLERGIES:    Allergies   Allergen Reactions    Lisinopril      Other reaction(s): itching    Dilantin [Phenytoin] Rash     REVIEW OF SYSTEMS:  A comprehensive review of systems was performed and found to be negative except as described here or above.     SOCIAL HISTORY:   Social History     Socioeconomic History    Marital status:      Spouse name: Not on file    Number of children: Not on file    Years of education: Not on file    Highest education level: Not on file   Occupational History    Not on file   Social Needs     "Financial resource strain: Not on file    Food insecurity:     Worry: Not on file     Inability: Not on file    Transportation needs:     Medical: Not on file     Non-medical: Not on file   Tobacco Use    Smoking status: Never Smoker    Smokeless tobacco: Never Used   Substance and Sexual Activity    Alcohol use: No     Frequency: Never.  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  Remote history of use > 40 yrs ago    Sexual activity: Not on file   Lifestyle    Physical activity:     Days per week: Swims 3x/week     Minutes per session: 25 min    Stress: Not on file   Relationships   Other Topics Concern    Not on file   Social History Narrative    Not on file     FAMILY MEDICAL HISTORY:   Family History   Problem Relation Age of Onset    Kidney failure Mother     Cystic Kidney Disease Sister    Sister (70) was recently diagnosed with PKD by MRI, with multiple bilat cyst  Brother (77) with possible PKD (with few cysts on one side only)  Sister (75) does not want to be evaluated, but used to run marathons, and still runs half-marathons.    PHYSICAL EXAM:   BP (!) 150/79   Pulse 58   Temp 98.2  F (36.8  C) (Oral)   Wt 71.2 kg (157 lb)   SpO2 98%   BMI 23.87 kg/m     GENERAL APPEARANCE: alert and no distress  EYES: nonicteric  NECK: supple, no adenopathy.   RESP: lungs clear to auscultation   CV: regular rhythm, normal rate, no rub  ABDOMEN: soft, nontender, normal bowel sounds, no HSM . Reports tenderness in suprapubic area, but is able to (self) palpate quite deeply.  No elicited pain on essentially doing a \"sit-up\"    Extremities: no edema  MS: no evidence of inflammation in joints, no muscle tenderness  SKIN: no rash  NEURO: mentation intact and speech normal  PSYCH: affect normal/bright     LABS:   CMP  Recent Labs   Lab Test 05/09/24  1251 01/18/24  1209 03/31/22  1245 09/30/21  1340 10/29/20  1407 07/30/20  1328 12/19/19  1416 06/13/19  1354 06/13/19  1354 04/05/19  1249 " 03/28/19  1456    141 146* 141 142 138 141  --  140   < > 142   POTASSIUM 4.3 4.3 4.2 3.8 3.8 3.8 4.0  --  4.1   < > 4.0   CHLORIDE 108* 107 111* 109 110* 106 109  --  109   < > 109   CO2 23 26 28 27 27 27 27  --  26   < > 27   ANIONGAP 9 8 7 5 5 5 6  --  5  --  5   GLC 99 104* 108* 104* 88 102* 96  --  91   < > 106*   BUN 26.8* 26.2* 25 27 30 27 27  --  29   < > 31*   CR 1.25* 1.26* 1.22 1.29* 1.22 1.26* 1.21  --  1.28*   < > 1.17   GFRESTIMATED 60* 59* 62 55* 58* 56* 59*  --  56*   < > 62   GFRESTBLACK  --   --   --   --  68 65 69  --  65  --  72   JOHN 8.9 8.9 8.5 8.5 8.6 8.2* 8.8  --  8.5   < > 8.7   PHOS 2.5 2.8 2.7 2.4* 3.1 3.0 2.8   < >  --   --   --    PROTTOTAL  --   --   --   --   --   --   --   --  6.4*  --  6.7*   ALBUMIN 4.5 4.5 3.9 3.6 4.0 3.9 4.2  --  3.9   < > 3.9   BILITOTAL  --   --   --   --   --   --   --   --  0.3  --  0.3   ALKPHOS  --   --   --   --   --   --   --   --  63  --  65   AST  --   --   --   --   --   --   --   --  16  --  18   ALT  --   --   --   --   --   --   --   --  19  --  24    < > = values in this interval not displayed.     CBC  Recent Labs   Lab Test 05/09/24  1251 01/18/24  1209 03/31/22  1245 09/30/21  1340   HGB 13.7 13.9 13.7 12.9*   WBC 5.6 5.6 6.9 4.6   RBC 4.15* 4.28* 4.26* 3.96*   HCT 39.6* 39.9* 40.8 37.4*   MCV 95 93 96 94   MCH 33.0 32.5 32.2 32.6   MCHC 34.6 34.8 33.6 34.5   RDW 12.6 12.8 12.5 12.7    211 226 185       Recent Labs   Lab Test 03/31/22  1248 09/30/21  1347 10/29/20  1410 07/30/20  1331 12/19/19  1405 06/13/19  1400 03/28/19  1501   UTPG 0.16 0.16 0.12 0.12 0.11 0.11 0.10         MRA [facility] 4/5/22  History: Cerebral aneurysm, follow-up; Cerebral aneurysm.  ICD-10: Cerebral aneurysm     Comparison:  MRA 4/10/2019      Technique: Noncontrast 3D time-of-flight MRA of the head.     Findings:     Head MRA demonstrates patent major intracranial arteries. No evidence  of flow within treated anterior communicating artery aneurysm.  The  anterior communicating artery appears patent. Stable right P1  posterior cerebral artery segment posteromedially oriented focal  outpouching measuring approximately 1 mm from dome to base with a 1 mm  wide neck. This appears to be an infundibulum, with vessel arising  from the apex. In addition, there is a similar additional infundibulum  near the origin of the left superior cerebellar artery. No new focal  outpouching or aneurysm.     There is no high flow vascular malformation. Fetal origin of the left  posterior cerebral artery.                                                                    Impression:   1.  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 No  new aneurysm.  2.  No definite flow seen in treated anterior communicating artery  aneurysm.     I have personally reviewed the examination and initial interpretation  and I agree with the findings.     DREAD WESTBROOK MD       CARDIAC STRESS ECHOCARDIOGRAM (11/04/2021 1:13 PM CDT)  Miscellaneous Results - CARDIAC STRESS ECHOCARDIOGRAM (11/04/2021 1:13 PM CDT)  Specimen (Source) Anatomical Location / Laterality Collection Method / Volume Collection Time Received Time         11/04/2021 1:13 PM CDT       Miscellaneous Results - CARDIAC STRESS ECHOCARDIOGRAM (11/04/2021 1:13 PM CDT)  Narrative   PROSOLV - 11/04/2021 2:43 PM CDT   Summary   1. The patient exercised for 8 minutes and 25 seconds on the  Preston  protocol.   2. Frequent PACs at rest and with stress, PVCs at peak exercise.   3. Good functional capacity for age. No chest pain.   4. Normal left ventricular systolic function with no regional wall motion  abnormalities noted at rest.   LVEF 60%.   5. Post stress, no wall motion abnormalities seen.   LV systolic size  decreases and systolic function increases appropriately.   6. Normal stress echocardiogram.   7. Mildly dilated aortic root of 4.2 cm is incidentally  noted on the  resting  images.      Report Signatures  Stress ECG Finalized by Nadine Kang on 11/4/2021 02:42 PM  Echo Finalized by Nadine Kang on 11/4/2021 02:42 PM      Miscellaneous Results - CARDIAC STRESS ECHOCARDIOGRAM (11/04/2021 1:13 PM CDT)  Procedure Note   Nadine Kang MD - 11/04/2021   Formatting of this note might be different from the original.  Summary  1. The patient exercised for 8 minutes and 25 seconds on the Preston  protocol.  2. Frequent PACs at rest and with stress, PVCs at peak exercise.  3. Good functional capacity for age. No chest pain.  4. Normal left ventricular systolic function with no regional wall motion  abnormalities noted at rest. LVEF 60%.  5. Post stress, no wall motion abnormalities seen. LV systolic size  decreases and systolic function increases appropriately.  6. Normal stress echocardiogram.  7. Mildly dilated aortic root of 4.2 cm is incidentally noted on the  resting  images.      Report Signatures  Stress ECG Finalized by Nadine Kang on 11/4/2021 02:42 PM  Echo Finalized by Nadine Kang on 11/4/2021 02:42 PM      ASSESSMENT AND PLAN:   Mr Yeager presents with PKD. Previously, we reviewed together the results of the MRI, the estimated TKV, and the estimated anticipated rate of progression of CKD given the current data. It was explained that he would most likely not benefit from Tolvaptan.    His renal function remains  Stable and well preserved.     BP:  BP remains suboptimally controlled, especially considering the history for small cerebral aneurysm.  This is despite the full dose losartan and amlodipine. His relative bradycardia precludes the addition of a beta blocker.  He did not start the hydrochlorothiazide .  Wishes to go back to lisinopril -> gave him a new prescription.  If he does not tolerate, we can try eplerenone or spironolactone.     Mr Yeager is concerned about his abd/suprapubic pain and discomfort.  He was seen by Dr Ware today  and his examination did not suggest recurrent hernia.  A CT of the abd was ordered.        Cerebral aneurysm: the repeat MRI did not show a significant change in the previously observed aneurysm.    Mr Shobha inquired about repeat imaging of the kidneys.  I offered to repeat an MRI - with the indication being mostly for cancer screening.  I do not think that an US would be sensitive enough.  We agree to differ this to the next visit.      Return to clinic in 6 months    Feroz Maria MD  Division of Nephrology and Hypertension

## 2024-05-09 NOTE — PROGRESS NOTES
Tariq Yeager is status post:  2/26/2024  Herniorrhaphy inguinal (Left) Procedure: HERNIORRHAPHY, INGUINAL, OPEN LEFT;  Surgeon: Ra Ware MD;  Location: [facility] OR    Surgery without complications.  Post-op course without complications.  Here today because over last month has had persistent pain over the supra pubic region. No bulges or symptoms of recurrence.  No change in urinary or bowel habits.  On exam today:  Mildly tender over pubis without mass. Inguinal canals are both esamines and show no recurrence.  Impression: suspect could be scar from mesh.  Options include PT increased stretching vs CT to evaluate more completely.    Plan: CT abdomen pelvis  I will call with results.

## 2024-05-09 NOTE — NURSING NOTE
Chief Complaint   Patient presents with    RECHECK     Return Glomerular        BP (!) 150/79   Pulse 58   Temp 98.2  F (36.8  C) (Oral)   Wt 71.2 kg (157 lb)   SpO2 98%   BMI 23.87 kg/m      Fabricio Wilcox on 5/9/2024 at 1:24 PM

## 2024-05-09 NOTE — PROGRESS NOTES
"Nephrology Clinic    Tariq Yeager MRN:[id number] YOB: 1945  Date of initial Service: October 29, 2020  Date of Service 3/31/2022        REASON FOR FOLLOW UP: Polycystic kidney disease    HISTORY OF PRESENT ILLNESS:  Tariq Yeager is a 73 year old male who presented initially for multiple kidney cysts, consistent with PKD.   Mr Yeager has recently been informed about the presence of bilateral renal cysts which were incidentally identified by MRI of the spine he received in early February 2018 for back pain.  The presence of bilateral renal cysts was confirmed by ultrasound also obtained recently. (2/11/19 [facility])(see below).  He denies any history of gross hematuria, flank pain, urolithiasis, or UTIs.  He does however have a history of acute sudden incerebral hemorrhage in 2002 for which he had a prolonged ICU hospitalization at [facility] .  Note is made that he also underwent placement of a permanent IVC filter, although Mr Yeager does not recall having had a DVT.  Prior to that event, it does not appear that he was in good health.  He had an inguinal herniorrhaphy on the R and reports a recurrence of the hernia + new Inguinal hernia on the L. No history of diverticulosis     June 13, 2019  Since the last visit, he had an MRI of Brain and Abd.  MRI of the brain revealed a small aneurysm for which I put in a referral to Dr STAN Robles.  Aneurysm was deemed very small and recommended repeat MRI in 2 year.  MRI of Abd revealed an estimated total kidney volume of 1627 ml, with class IB risk  category of progression of ADPKD, with an estimated projected eGFR ~ 24 ml/min in 10 years.  He is moderating his caffeine intake, salt intake.    Still swims 3 x a week.  Feels generally well.  No HA.     December 19, 2019  Since last visit, had a cologard test that came back positive. He is scheduled to have a colonoscopy in Jan 2020.  Otherwise feels \"very well physically\" \"totally " "boring\"  Swims 25 min twice a week, without CP or ESCOBEDO.    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  No L Ext swelling.  No gross hematuria or  symptoms.  Rare diarrhea.  No abd pain.  No HA.  Admits to forgetting his meds on occasion (estimates he takes his meds 5 days a week)  Drinks 6 cups of coffee a day.      July 30, 2020  Since last visit, he notes doing well. No hospitalizations or significant illnesses. No problems with hematuria or dysuria.  No abd pain. Continues to swim regularly, 3-4x per week.   He continues to have chronic headaches, almost nightly, for many years. No new symptoms or change in character. He does snore quite loudly per the wife, but no witnessed apnea. Does not take Tylenol or Advil.  In terms of BP, home BP is running 130-140/80-90. Remains on amlodipine and lisinopril, good adherence.  Of note, he did have a fall while fixing some gutters, and did briefly lose consciousness. Feeling fine now without issue. Did not seek medical attention.     October 29, 2020  24 hour BP monitoring  July 30, 2020  Overall Syst  +/- 11.67  Diast BP 74+/-5.8  Pulse pressure 62+/-10.0   HR 58+/-8.9  Syst > limits 52%, Diast > limit 21%  Please see scanned document for full results  Of Note, most of the that day, he was very active doing repairs \"on a ladder\".       Has new BP machine 122-156  Most > 135  Drinks about 8 x 6 oz of coffee, has cut down to 3 cups a day.  Continues to cut down salt, paying attention to sodium content of foods.  Continues to swim 3 x a week, currently doing 14 laps + exersices,  Plus once a week aerobic exercise.    September 30, 2021  Overall feeling well. Blood pressure control sub-optimal with systolic blood pressure ranging between 130-150. Is trying to cut down on caffeine intake. Down to 4 cups a day. No interim hospitalization or change in meds. Started taking Lisinopril 30 mg about a week ago (before this was taking 20 mg).     ROS;   Occasional mild flank pain.  Leg " cramps + (trying Tonic water - has quinine, which he reports helps)  No GI symptoms.  No  symptoms,   Stream may have slowed down.  No L Ext swelling.  No Change in L inguinal hernia.        March 31, 2022  Feels generally well.  To have repeat MRI of brain next week to follow up on small aneurysm seen 2 years ago.  No GI or  symptoms  Had nocturnal pressure CP, recurrent. -> had stress echo in Nov 2021 which was normal  Taking losartan 50 mg (instead of 100)    January 18, 2024  Mr Tavares presents with no acute complaints.  He feels generally well and remains physically active.  He raised questions about his L inguinal hernia which he discussed with his PCP, but did not have evaluated by surgery.  It has caused no acute pain and is easily reducible by his description.  He then reported some RLQ localized pain superior to the inguinal ligament, without a visible or palpable bulge.   He otherwise has no HA, gross hematuria, dysuria or GI complaints.  No SOB or CP.   BP at home remains suboptimally controlled with SBP usually > 140.  No L Ext swelling      May 9, 2024  Had L inguinal hernia repair Feb 29  Has suprapubic discomfort and saw Dr Ware today.  Feels that BP is not as good on Losartan, would like to go back to lisinopril.  At home -163.  He does not want to take hydrochlorothiazide   No CP, SOB, flank pain  Does have pain in right inguinal area as well.  No L Ext swelling.  No gross hematuria.      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    Hypertension 2002    Inguinal hernia 2016    Presence of IVC filter 2002     PAST SURGICAL HISTORY:  Past Surgical History:   Procedure Laterality Date    cerebral aneurysm coil  2002    CRANIOTOMY, REPAIR ANEURYSM, COMBINED  2002    HERNIORRHAPHY INGUINAL Right 2016     HERNIORRHAPHY INGUINAL Left 2/26/2024    Procedure: HERNIORRHAPHY, INGUINAL, OPEN LEFT;  Surgeon: Ra Ware MD;  Location: [facility] OR     MEDICATIONS:  Current Outpatient Medications   Medication Sig Dispense Refill    amLODIPine (NORVASC) 10 MG tablet Take 1 tablet (10 mg) by mouth daily 90 tablet 3    losartan (COZAAR) 100 MG tablet Take 1 tablet (100 mg) by mouth daily 90 tablet 3    sildenafil (REVATIO) 20 MG tablet   See Instructions, Instructions: TAKE THREE TO FIVE TABLETS BY MOUTH 30 MINUTES PRIOR TO SEXUAL ACTIVITY, # 20 tab(s), 0 Refill(s), Pharmacy: [facility][id number], TAKE THREE TO FIVE TABLETS BY MOUTH 30 MINUTES PRIOR TO SEXUAL ACTIVITY, 67, in, 10/25...      hydrochlorothiazide (HYDRODIURIL) 25 MG tablet Take 1 tablet (25 mg) by mouth daily (Patient not taking: Reported on 5/9/2024) 90 tablet 3    HYDROcodone-acetaminophen (NORCO) 5-325 MG tablet Take 1-2 tablets by mouth every 4 hours as needed for moderate to severe pain 15 tablet 0    senna-docusate (SENOKOT-S/PERICOLACE) 8.6-50 MG tablet Take 1-2 tablets by mouth 2 times daily 15 tablet 0         ALLERGIES:    Allergies   Allergen Reactions    Lisinopril      Other reaction(s): itching    Dilantin [Phenytoin] Rash     REVIEW OF SYSTEMS:  A comprehensive review of systems was performed and found to be negative except as described here or above.     SOCIAL HISTORY:   Social History     Socioeconomic History    Marital status:      Spouse name: Not on file    Number of children: Not on file    Years of education: Not on file    Highest education level: Not on file   Occupational History    Not on file   Social Needs    Financial resource strain: Not on file    Food insecurity:     Worry: Not on file     Inability: Not on file    Transportation needs:     Medical: Not on file     Non-medical: Not on file   Tobacco Use    Smoking status: Never Smoker    Smokeless tobacco: Never Used   Substance and Sexual Activity    Alcohol use:  "No     Frequency: Never.  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  Remote history of use > 40 yrs ago    Sexual activity: Not on file   Lifestyle    Physical activity:     Days per week: Swims 3x/week     Minutes per session: 25 min    Stress: Not on file   Relationships   Other Topics Concern    Not on file   Social History Narrative    Not on file     FAMILY MEDICAL HISTORY:   Family History   Problem Relation Age of Onset    Kidney failure Mother     Cystic Kidney Disease Sister    Sister (70) was recently diagnosed with PKD by MRI, with multiple bilat cyst  Brother (77) with possible PKD (with few cysts on one side only)  Sister (75) does not want to be evaluated, but used to run marathons, and still runs half-marathons.    PHYSICAL EXAM:   BP (!) 150/79   Pulse 58   Temp 98.2  F (36.8  C) (Oral)   Wt 71.2 kg (157 lb)   SpO2 98%   BMI 23.87 kg/m     GENERAL APPEARANCE: alert and no distress  EYES: nonicteric  NECK: supple, no adenopathy.   RESP: lungs clear to auscultation   CV: regular rhythm, normal rate, no rub  ABDOMEN: soft, nontender, normal bowel sounds, no HSM . Reports tenderness in suprapubic area, but is able to (self) palpate quite deeply.  No elicited pain on essentially doing a \"sit-up\"    Extremities: no edema  MS: no evidence of inflammation in joints, no muscle tenderness  SKIN: no rash  NEURO: mentation intact and speech normal  PSYCH: affect normal/bright     LABS:   CMP  Recent Labs   Lab Test 05/09/24  1251 01/18/24  1209 03/31/22  1245 09/30/21  1340 10/29/20  1407 07/30/20  1328 12/19/19  1416 06/13/19  1354 06/13/19  1354 04/05/19  1249 03/28/19  1456    141 146* 141 142 138 141  --  140   < > 142   POTASSIUM 4.3 4.3 4.2 3.8 3.8 3.8 4.0  --  4.1   < > 4.0   CHLORIDE 108* 107 111* 109 110* 106 109  --  109   < > 109   CO2 23 26 28 27 27 27 27  --  26   < > 27   ANIONGAP 9 8 7 5 5 5 6  --  5  --  5   GLC 99 104* 108* 104* 88 102* 96  --  91   < > " 106*   BUN 26.8* 26.2* 25 27 30 27 27  --  29   < > 31*   CR 1.25* 1.26* 1.22 1.29* 1.22 1.26* 1.21  --  1.28*   < > 1.17   GFRESTIMATED 60* 59* 62 55* 58* 56* 59*  --  56*   < > 62   GFRESTBLACK  --   --   --   --  68 65 69  --  65  --  72   JOHN 8.9 8.9 8.5 8.5 8.6 8.2* 8.8  --  8.5   < > 8.7   PHOS 2.5 2.8 2.7 2.4* 3.1 3.0 2.8   < >  --   --   --    PROTTOTAL  --   --   --   --   --   --   --   --  6.4*  --  6.7*   ALBUMIN 4.5 4.5 3.9 3.6 4.0 3.9 4.2  --  3.9   < > 3.9   BILITOTAL  --   --   --   --   --   --   --   --  0.3  --  0.3   ALKPHOS  --   --   --   --   --   --   --   --  63  --  65   AST  --   --   --   --   --   --   --   --  16  --  18   ALT  --   --   --   --   --   --   --   --  19  --  24    < > = values in this interval not displayed.     CBC  Recent Labs   Lab Test 05/09/24  1251 01/18/24  1209 03/31/22  1245 09/30/21  1340   HGB 13.7 13.9 13.7 12.9*   WBC 5.6 5.6 6.9 4.6   RBC 4.15* 4.28* 4.26* 3.96*   HCT 39.6* 39.9* 40.8 37.4*   MCV 95 93 96 94   MCH 33.0 32.5 32.2 32.6   MCHC 34.6 34.8 33.6 34.5   RDW 12.6 12.8 12.5 12.7    211 226 185       Recent Labs   Lab Test 03/31/22  1248 09/30/21  1347 10/29/20  1410 07/30/20  1331 12/19/19  1405 06/13/19  1400 03/28/19  1501   UTPG 0.16 0.16 0.12 0.12 0.11 0.11 0.10         MRA [place] 4/5/22  History: Cerebral aneurysm, follow-up; Cerebral aneurysm.  ICD-10: Cerebral aneurysm     Comparison:  MRA 4/10/2019      Technique: Noncontrast 3D time-of-flight MRA of the head.     Findings:     Head MRA demonstrates patent major intracranial arteries. No evidence  of flow within treated anterior communicating artery aneurysm. The  anterior communicating artery appears patent. Stable right P1  posterior cerebral artery segment posteromedially oriented focal  outpouching measuring approximately 1 mm from dome to base with a 1 mm  wide neck. This appears to be an infundibulum, with vessel arising  from the apex. In addition, there is a similar  additional infundibulum  near the origin of the left superior cerebellar artery. No new focal  outpouching or aneurysm.     There is no high flow vascular malformation. Fetal origin of the left  posterior cerebral artery.                                                                    Impression:   1.  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 No  new aneurysm.  2.  No definite flow seen in treated anterior communicating artery  aneurysm.     I have personally reviewed the examination and initial interpretation  and I agree with the findings.     DREAD WESTBROOK MD       CARDIAC STRESS ECHOCARDIOGRAM (11/04/2021 1:13 PM CDT)  Miscellaneous Results - CARDIAC STRESS ECHOCARDIOGRAM (11/04/2021 1:13 PM CDT)  Specimen (Source) Anatomical Location / Laterality Collection Method / Volume Collection Time Received Time         11/04/2021 1:13 PM CDT       Miscellaneous Results - CARDIAC STRESS ECHOCARDIOGRAM (11/04/2021 1:13 PM CDT)  Narrative   PROSOLV - 11/04/2021 2:43 PM CDT   Summary   1. The patient exercised for 8 minutes and 25 seconds on the  Preston  protocol.   2. Frequent PACs at rest and with stress, PVCs at peak exercise.   3. Good functional capacity for age. No chest pain.   4. Normal left ventricular systolic function with no regional wall motion  abnormalities noted at rest.   LVEF 60%.   5. Post stress, no wall motion abnormalities seen.   LV systolic size  decreases and systolic function increases appropriately.   6. Normal stress echocardiogram.   7. Mildly dilated aortic root of 4.2 cm is incidentally noted on the  resting  images.      Report Signatures  Stress ECG Finalized by Nadine Kang on 11/4/2021 02:42 PM  Echo Finalized by Nadine Kang on 11/4/2021 02:42 PM      Miscellaneous Results - CARDIAC STRESS ECHOCARDIOGRAM (11/04/2021 1:13 PM CDT)  Procedure Note   Nadine Kang MD - 11/04/2021    Formatting of this note might be different from the original.  Summary  1. The patient exercised for 8 minutes and 25 seconds on the Preston  protocol.  2. Frequent PACs at rest and with stress, PVCs at peak exercise.  3. Good functional capacity for age. No chest pain.  4. Normal left ventricular systolic function with no regional wall motion  abnormalities noted at rest. LVEF 60%.  5. Post stress, no wall motion abnormalities seen. LV systolic size  decreases and systolic function increases appropriately.  6. Normal stress echocardiogram.  7. Mildly dilated aortic root of 4.2 cm is incidentally noted on the  resting  images.      Report Signatures  Stress ECG Finalized by Nadine Kang on 11/4/2021 02:42 PM  Echo Finalized by Nadine Kang on 11/4/2021 02:42 PM      ASSESSMENT AND PLAN:   Mr Yeager presents with PKD. Previously, we reviewed together the results of the MRI, the estimated TKV, and the estimated anticipated rate of progression of CKD given the current data. It was explained that he would most likely not benefit from Tolvaptan.    His renal function remains  Stable and well preserved.     BP:  BP remains suboptimally controlled, especially considering the history for small cerebral aneurysm.  This is despite the full dose losartan and amlodipine. His relative bradycardia precludes the addition of a beta blocker.  He did not start the hydrochlorothiazide .  Wishes to go back to lisinopril -> gave him a new prescription.  If he does not tolerate, we can try eplerenone or spironolactone.     Mr Yeager is concerned about his abd/suprapubic pain and discomfort.  He was seen by Dr Ware today and his examination did not suggest recurrent hernia.  A CT of the abd was ordered.        Cerebral aneurysm: the repeat MRI did not show a significant change in the previously observed aneurysm.    Mr Yeager inquired about repeat imaging of the kidneys.  I offered to repeat an MRI - with the indication  being mostly for cancer screening.  I do not think that an US would be sensitive enough.  We agree to differ this to the next visit.      Return to clinic in 6 months    Feroz Maria MD  Division of Nephrology and Hypertension

## 2024-05-10 ENCOUNTER — TELEPHONE (OUTPATIENT)
Dept: NEPHROLOGY | Facility: CLINIC | Age: 79
End: 2024-05-10
Payer: COMMERCIAL

## 2024-05-21 ENCOUNTER — TELEPHONE (OUTPATIENT)
Dept: NEPHROLOGY | Facility: CLINIC | Age: 79
End: 2024-05-21
Payer: COMMERCIAL

## 2024-05-21 NOTE — TELEPHONE ENCOUNTER
MIKE w/ writer's direct call back information for pt to schedule 6 mo follow-up w/ Dr. Feroz Maria in Neph GC clinic around November 2024 per request from Dorita Lama, Adult Renal RNCC.    Siria Carter  Pediatric Nephrology/ Neph Genetic Clinic  Sr. Patient Coordinator/ Sr. Complex Referral Specialist  [facility]/ [facility]

## 2024-05-23 NOTE — TELEPHONE ENCOUNTER
Pt has been scheduled for follow-up w/ Dr. Maria in Neph GC clinic on Friday, November 1st @ 11am w/ labs prior @ 10am.  Confirmed date/ time/ location/ provider with pt.    Siria Carter  Pediatric Nephrology/ Neph Genetic Clinic  Sr. Patient Coordinator/ Sr. Complex Referral Specialist  [facility]/ [facility]

## 2024-06-01 ENCOUNTER — HEALTH MAINTENANCE LETTER (OUTPATIENT)
Age: 79
End: 2024-06-01

## 2024-07-01 ENCOUNTER — ANCILLARY PROCEDURE (OUTPATIENT)
Dept: CT IMAGING | Facility: CLINIC | Age: 79
End: 2024-07-01
Attending: SURGERY
Payer: COMMERCIAL

## 2024-07-01 DIAGNOSIS — R52 PAIN: ICD-10-CM

## 2024-07-01 LAB
CREAT BLD-MCNC: 1.5 MG/DL (ref 0.7–1.3)
EGFRCR SERPLBLD CKD-EPI 2021: 48 ML/MIN/1.73M2

## 2024-07-01 PROCEDURE — 72193 CT PELVIS W/DYE: CPT | Mod: GC | Performed by: RADIOLOGY

## 2024-07-01 PROCEDURE — 82565 ASSAY OF CREATININE: CPT | Performed by: PATHOLOGY

## 2024-07-01 RX ORDER — IOPAMIDOL 755 MG/ML
84 INJECTION, SOLUTION INTRAVASCULAR ONCE
Status: COMPLETED | OUTPATIENT
Start: 2024-07-01 | End: 2024-07-01

## 2024-07-01 RX ADMIN — IOPAMIDOL 84 ML: 755 INJECTION, SOLUTION INTRAVASCULAR at 15:43

## 2024-07-01 NOTE — DISCHARGE INSTRUCTIONS

## 2024-07-02 ENCOUNTER — PATIENT OUTREACH (OUTPATIENT)
Dept: SURGERY | Facility: CLINIC | Age: 79
End: 2024-07-02
Payer: COMMERCIAL

## 2024-07-02 NOTE — PROGRESS NOTES
CT ordered by Dr. Ware now resulted. Dr. Ware reviewed the CT and called patient to discuss recommendations. It is likely pain from mesh that was placed during a previous surgery. Dr. aWre recommends aggressive stretching or physical therapy for the pain he is experiencing. Patient will call the Nurse Advice line if he has any questions or concerns.

## 2024-11-01 ENCOUNTER — OFFICE VISIT (OUTPATIENT)
Dept: NEPHROLOGY | Facility: CLINIC | Age: 79
End: 2024-11-01
Attending: INTERNAL MEDICINE
Payer: MEDICARE

## 2024-11-01 ENCOUNTER — APPOINTMENT (OUTPATIENT)
Dept: LAB | Facility: CLINIC | Age: 79
End: 2024-11-01
Attending: INTERNAL MEDICINE
Payer: MEDICARE

## 2024-11-01 VITALS
HEART RATE: 62 BPM | WEIGHT: 154.4 LBS | SYSTOLIC BLOOD PRESSURE: 133 MMHG | DIASTOLIC BLOOD PRESSURE: 76 MMHG | HEIGHT: 68 IN | BODY MASS INDEX: 23.4 KG/M2

## 2024-11-01 DIAGNOSIS — Q61.2 ADPKD (AUTOSOMAL DOMINANT POLYCYSTIC KIDNEY DISEASE): ICD-10-CM

## 2024-11-01 DIAGNOSIS — I67.1 CEREBRAL ANEURYSM: ICD-10-CM

## 2024-11-01 DIAGNOSIS — I12.9 HYPERTENSION, RENAL: ICD-10-CM

## 2024-11-01 LAB
ALBUMIN MFR UR ELPH: <6 MG/DL
ALBUMIN SERPL BCG-MCNC: 4.4 G/DL (ref 3.5–5.2)
ALBUMIN UR-MCNC: NEGATIVE MG/DL
ANION GAP SERPL CALCULATED.3IONS-SCNC: 9 MMOL/L (ref 7–15)
APPEARANCE UR: CLEAR
BASOPHILS # BLD AUTO: 0 10E3/UL (ref 0–0.2)
BASOPHILS NFR BLD AUTO: 1 %
BILIRUB UR QL STRIP: NEGATIVE
BUN SERPL-MCNC: 25.6 MG/DL (ref 8–23)
CALCIUM SERPL-MCNC: 9 MG/DL (ref 8.8–10.4)
CHLORIDE SERPL-SCNC: 102 MMOL/L (ref 98–107)
COLOR UR AUTO: ABNORMAL
CREAT SERPL-MCNC: 1.41 MG/DL (ref 0.67–1.17)
CREAT UR-MCNC: 68.7 MG/DL
CREAT UR-MCNC: 73.9 MG/DL
EGFRCR SERPLBLD CKD-EPI 2021: 52 ML/MIN/1.73M2
EOSINOPHIL # BLD AUTO: 0.1 10E3/UL (ref 0–0.7)
EOSINOPHIL NFR BLD AUTO: 1 %
ERYTHROCYTE [DISTWIDTH] IN BLOOD BY AUTOMATED COUNT: 12.4 % (ref 10–15)
GLUCOSE SERPL-MCNC: 83 MG/DL (ref 70–99)
GLUCOSE UR STRIP-MCNC: NEGATIVE MG/DL
HCO3 SERPL-SCNC: 28 MMOL/L (ref 22–29)
HCT VFR BLD AUTO: 37.4 % (ref 40–53)
HGB BLD-MCNC: 13 G/DL (ref 13.3–17.7)
HGB UR QL STRIP: NEGATIVE
IMM GRANULOCYTES # BLD: 0 10E3/UL
IMM GRANULOCYTES NFR BLD: 0 %
KETONES UR STRIP-MCNC: NEGATIVE MG/DL
LEUKOCYTE ESTERASE UR QL STRIP: NEGATIVE
LYMPHOCYTES # BLD AUTO: 1.8 10E3/UL (ref 0.8–5.3)
LYMPHOCYTES NFR BLD AUTO: 28 %
MCH RBC QN AUTO: 33.4 PG (ref 26.5–33)
MCHC RBC AUTO-ENTMCNC: 34.8 G/DL (ref 31.5–36.5)
MCV RBC AUTO: 96 FL (ref 78–100)
MICROALBUMIN UR-MCNC: <12 MG/L
MICROALBUMIN/CREAT UR: NORMAL MG/G{CREAT}
MONOCYTES # BLD AUTO: 0.7 10E3/UL (ref 0–1.3)
MONOCYTES NFR BLD AUTO: 12 %
MUCOUS THREADS #/AREA URNS LPF: PRESENT /LPF
NEUTROPHILS # BLD AUTO: 3.7 10E3/UL (ref 1.6–8.3)
NEUTROPHILS NFR BLD AUTO: 59 %
NITRATE UR QL: NEGATIVE
NRBC # BLD AUTO: 0 10E3/UL
NRBC BLD AUTO-RTO: 0 /100
PH UR STRIP: 5.5 [PH] (ref 5–7)
PHOSPHATE SERPL-MCNC: 3.1 MG/DL (ref 2.5–4.5)
PLATELET # BLD AUTO: 212 10E3/UL (ref 150–450)
POTASSIUM SERPL-SCNC: 3.7 MMOL/L (ref 3.4–5.3)
PROT/CREAT 24H UR: NORMAL MG/G{CREAT}
RBC # BLD AUTO: 3.89 10E6/UL (ref 4.4–5.9)
RBC URINE: 1 /HPF
SODIUM SERPL-SCNC: 139 MMOL/L (ref 135–145)
SP GR UR STRIP: 1.01 (ref 1–1.03)
SQUAMOUS EPITHELIAL: <1 /HPF
UROBILINOGEN UR STRIP-MCNC: NORMAL MG/DL
WBC # BLD AUTO: 6.3 10E3/UL (ref 4–11)
WBC URINE: 1 /HPF

## 2024-11-01 PROCEDURE — 82043 UR ALBUMIN QUANTITATIVE: CPT | Performed by: INTERNAL MEDICINE

## 2024-11-01 PROCEDURE — 84156 ASSAY OF PROTEIN URINE: CPT | Performed by: INTERNAL MEDICINE

## 2024-11-01 PROCEDURE — 81001 URINALYSIS AUTO W/SCOPE: CPT | Performed by: INTERNAL MEDICINE

## 2024-11-01 PROCEDURE — 85004 AUTOMATED DIFF WBC COUNT: CPT | Performed by: INTERNAL MEDICINE

## 2024-11-01 PROCEDURE — G0463 HOSPITAL OUTPT CLINIC VISIT: HCPCS | Performed by: INTERNAL MEDICINE

## 2024-11-01 PROCEDURE — 36415 COLL VENOUS BLD VENIPUNCTURE: CPT | Performed by: INTERNAL MEDICINE

## 2024-11-01 PROCEDURE — 82310 ASSAY OF CALCIUM: CPT | Performed by: INTERNAL MEDICINE

## 2024-11-01 PROCEDURE — 99214 OFFICE O/P EST MOD 30 MIN: CPT | Performed by: INTERNAL MEDICINE

## 2024-11-01 RX ORDER — LISINOPRIL 40 MG/1
40 TABLET ORAL DAILY
Qty: 90 TABLET | Refills: 3 | Status: SHIPPED | OUTPATIENT
Start: 2024-11-01

## 2024-11-01 RX ORDER — AMLODIPINE BESYLATE 10 MG/1
10 TABLET ORAL DAILY
Qty: 90 TABLET | Refills: 3 | Status: SHIPPED | OUTPATIENT
Start: 2024-11-01

## 2024-11-01 NOTE — PROGRESS NOTES
"Nephrology Clinic    Tariq Yeager MRN:[id number] YOB: 1945    REASON FOR FOLLOW UP: Polycystic kidney disease    HISTORY OF PRESENT ILLNESS:  Date of initial Service: October 29, 2020  Tariq Yeager is a 73 year old male who presented initially for multiple kidney cysts, consistent with PKD.   Mr Yeager has recently been informed about the presence of bilateral renal cysts which were incidentally identified by MRI of the spine he received in early February 2018 for back pain.  The presence of bilateral renal cysts was confirmed by ultrasound also obtained recently. (2/11/19 [facility])(see below).  He denies any history of gross hematuria, flank pain, urolithiasis, or UTIs.  He does however have a history of acute sudden incerebral hemorrhage in 2002 for which he had a prolonged ICU hospitalization at [facility] .  Note is made that he also underwent placement of a permanent IVC filter, although Mr Yeager does not recall having had a DVT.  Prior to that event, it does not appear that he was in good health.  He had an inguinal herniorrhaphy on the R and reports a recurrence of the hernia + new Inguinal hernia on the L. No history of diverticulosis     June 13, 2019  Since the last visit, he had an MRI of Brain and Abd.  MRI of the brain revealed a small aneurysm for which I put in a referral to Dr STAN Robles.  Aneurysm was deemed very small and recommended repeat MRI in 2 year.  MRI of Abd revealed an estimated total kidney volume of 1627 ml, with class IB risk  category of progression of ADPKD, with an estimated projected eGFR ~ 24 ml/min in 10 years.  He is moderating his caffeine intake, salt intake.    Still swims 3 x a week.  Feels generally well.  No HA.     December 19, 2019  Since last visit, had a cologard test that came back positive. He is scheduled to have a colonoscopy in Jan 2020.  Otherwise feels \"very well physically\" \"totally boring\"  Swims 25 min twice a " "week, without CP or ESC[place].    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  No L Ext swelling.  No gross hematuria or  symptoms.  Rare diarrhea.  No abd pain.  No HA.  Admits to forgetting his meds on occasion (estimates he takes his meds 5 days a week)  Drinks 6 cups of coffee a day.      July 30, 2020  Since last visit, he notes doing well. No hospitalizations or significant illnesses. No problems with hematuria or dysuria.  No abd pain. Continues to swim regularly, 3-4x per week.   He continues to have chronic headaches, almost nightly, for many years. No new symptoms or change in character. He does snore quite loudly per the wife, but no witnessed apnea. Does not take Tylenol or Advil.  In terms of BP, home BP is running 130-140/80-90. Remains on amlodipine and lisinopril, good adherence.  Of note, he did have a fall while fixing some gutters, and did briefly lose consciousness. Feeling fine now without issue. Did not seek medical attention.     October 29, 2020  24 hour BP monitoring  July 30, 2020  Overall Syst  +/- 11.67  Diast BP 74+/-5.8  Pulse pressure 62+/-10.0   HR 58+/-8.9  Syst > limits 52%, Diast > limit 21%  Please see scanned document for full results  Of Note, most of the that day, he was very active doing repairs \"on a ladder\".       Has new BP machine 122-156  Most > 135  Drinks about 8 x 6 oz of coffee, has cut down to 3 cups a day.  Continues to cut down salt, paying attention to sodium content of foods.  Continues to swim 3 x a week, currently doing 14 laps + exersices,  Plus once a week aerobic exercise.    September 30, 2021  Overall feeling well. Blood pressure control sub-optimal with systolic blood pressure ranging between 130-150. Is trying to cut down on caffeine intake. Down to 4 cups a day. No interim hospitalization or change in meds. Started taking Lisinopril 30 mg about a week ago (before this was taking 20 mg).     ROS;   Occasional mild flank pain.  Leg cramps + (trying Tonic water - has " quinine, which he reports helps)  No GI symptoms.  No  symptoms,   Stream may have slowed down.  No L Ext swelling.  No Change in L inguinal hernia.        March 31, 2022  Feels generally well.  To have repeat MRI of brain next week to follow up on small aneurysm seen 2 years ago.  No GI or  symptoms  Had nocturnal pressure CP, recurrent. -> had stress echo in Nov 2021 which was normal  Taking losartan 50 mg (instead of 100)    January 18, 2024  Mr Tavares presents with no acute complaints.  He feels generally well and remains physically active.  He raised questions about his L inguinal hernia which he discussed with his PCP, but did not have evaluated by surgery.  It has caused no acute pain and is easily reducible by his description.  He then reported some RLQ localized pain superior to the inguinal ligament, without a visible or palpable bulge.   He otherwise has no HA, gross hematuria, dysuria or GI complaints.  No SOB or CP.   BP at home remains suboptimally controlled with SBP usually > 140.  No L Ext swelling      May 9, 2024  Had L inguinal hernia repair Feb 29  Has suprapubic discomfort and saw Dr Ware today.  Feels that BP is not as good on Losartan, would like to go back to lisinopril.  At home -163.  He does not want to take hydrochlorothiazide   No CP, SOB, flank pain  Does have pain in right inguinal area as well.  No L Ext swelling.  No gross hematuria.    November 1, 2024  Feels generally well.  He had a L inguinal hernia repaired by Dr Ware on 2/26/24   He also underwent cataract surgery right eye on 6/26/24  He had a virtual visit with Dr Robles, neurosurgery on 4/30/24.  Repeat MRA without significant change from previous. It was determined that he could have the next repeat MRA in 5 years (around 4/2029)  He denies any gross hematuria or flank pain. No L Ext swelling.   No GI or  symptoms.    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 90 tablet 3    hydrochlorothiazide (HYDRODIURIL) 25 MG tablet Take 1 tablet (25 mg) by mouth daily 90 tablet 3    lisinopril (ZESTRIL) 40 MG tablet Take 1 tablet (40 mg) by mouth daily. 90 tablet 3    sildenafil (REVATIO) 20 MG tablet   See Instructions, Instructions: TAKE THREE TO FIVE TABLETS BY MOUTH 30 MINUTES PRIOR TO SEXUAL ACTIVITY, # 20 tab(s), 0 Refill(s), Pharmacy: [id number], TAKE THREE TO FIVE TABLETS BY MOUTH 30 MINUTES PRIOR TO SEXUAL ACTIVITY, 69, in, 10/25...      HYDROcodone-acetaminophen (NORCO) 5-325 MG tablet Take 1-2 tablets by mouth every 4 hours as needed for moderate to severe pain 15 tablet 0    senna-docusate (SENOKOT-S/PERICOLACE) 8.6-50 MG tablet Take 1-2 tablets by mouth 2 times daily 15 tablet 0         ALLERGIES:    Allergies   Allergen Reactions    Lisinopril      Other reaction(s): itching    Dilantin [Phenytoin] Rash     REVIEW OF SYSTEMS:  A comprehensive review of systems was performed and found to be negative except as described here or above.     SOCIAL HISTORY:   Social History     Socioeconomic History    Marital status:      Spouse name: Not on file    Number of children: Not on file    Years of education: Not on file    Highest education level: Not on  "file   Occupational History    Not on file   Social Needs    Financial resource strain: Not on file    Food insecurity:     Worry: Not on file     Inability: Not on file    Transportation needs:     Medical: Not on file     Non-medical: Not on file   Tobacco Use    Smoking status: Never Smoker    Smokeless tobacco: Never Used   Substance and Sexual Activity    Alcohol use: No     Frequency: Never.  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  Remote history of use > 40 yrs ago    Sexual activity: Not on file   Lifestyle    Physical activity:     Days per week: Swims 3x/week     Minutes per session: 25 min    Stress: Not on file   Relationships   Other Topics Concern    Not on file   Social History Narrative    Not on file     FAMILY MEDICAL HISTORY:   Family History   Problem Relation Age of Onset    Kidney failure Mother     Cystic Kidney Disease Sister    Sister (70) was recently diagnosed with PKD by MRI, with multiple bilat cyst  Brother (77) with possible PKD (with few cysts on one side only)  Sister (75) does not want to be evaluated, but used to run marathons, and still runs half-marathons.    PHYSICAL EXAM:   /76 (BP Location: Right arm, Patient Position: Sitting, Cuff Size: Adult Regular)   Pulse 62   Ht 5' 7.52\" (171.5 cm)   Wt 154 lb 6.4 oz (70 kg)   BMI 23.81 kg/m     GENERAL APPEARANCE: alert and no distress  EYES: nonicteric  NECK: supple, no adenopathy.   RESP: lungs clear to auscultation   CV: regular rhythm, normal rate, no rub  ABDOMEN: soft,     Extremities: no edema  MS: no evidence of inflammation in joints, no muscle tenderness  SKIN: no rash  NEURO: mentation intact and speech normal  PSYCH: affect normal/bright     LABS:   CMP  Recent Labs   Lab Test 11/01/24  1050 07/01/24  1538 05/09/24  1251 01/18/24  1209 03/31/22  1245 09/30/21  1340 10/29/20  1407 07/30/20  1328 12/19/19  1416 06/13/19  1354 06/13/19  1354 04/05/19  1249 03/28/19  1456     --  " 140 141 146*   < > 142 138 141  --  140   < > 142   POTASSIUM 3.7  --  4.3 4.3 4.2   < > 3.8 3.8 4.0  --  4.1   < > 4.0   CHLORIDE 102  --  108* 107 111*   < > 110* 106 109  --  109   < > 109   CO2 28  --  23 26 28   < > 27 27 27  --  26   < > 27   ANIONGAP 9  --  9 8 7   < > 5 5 6  --  5  --  5   GLC 83  --  99 104* 108*   < > 88 102* 96  --  91   < > 106*   BUN 25.6*  --  26.8* 26.2* 25   < > 30 27 27  --  29   < > 31*   CR 1.41* 1.5* 1.25* 1.26* 1.22   < > 1.22 1.26* 1.21  --  1.28*   < > 1.17   GFRESTIMATED 52* 48* 60* 59* 62   < > 58* 56* 59*  --  56*   < > 62   GFRESTBLACK  --   --   --   --   --   --  68 65 69  --  65  --  72   JOHN 9.0  --  8.9 8.9 8.5   < > 8.6 8.2* 8.8  --  8.5   < > 8.7   PHOS 3.1  --  2.5 2.8 2.7   < > 3.1 3.0 2.8   < >  --   --   --    PROTTOTAL  --   --   --   --   --   --   --   --   --   --  6.4*  --  6.7*   ALBUMIN 4.4  --  4.5 4.5 3.9   < > 4.0 3.9 4.2  --  3.9   < > 3.9   BILITOTAL  --   --   --   --   --   --   --   --   --   --  0.3  --  0.3   ALKPHOS  --   --   --   --   --   --   --   --   --   --  63  --  65   AST  --   --   --   --   --   --   --   --   --   --  16  --  18   ALT  --   --   --   --   --   --   --   --   --   --  19  --  24    < > = values in this interval not displayed.     CBC  Recent Labs   Lab Test 11/01/24  1050 05/09/24  1251 01/18/24  1209 03/31/22  1245   HGB 13.0* 13.7 13.9 13.7   WBC 6.3 5.6 5.6 6.9   RBC 3.89* 4.15* 4.28* 4.26*   HCT 37.4* 39.6* 39.9* 40.8   MCV 96 95 93 96   MCH 33.4* 33.0 32.5 32.2   MCHC 34.8 34.6 34.8 33.6   RDW 12.4 12.6 12.8 12.5    202 211 226       Recent Labs   Lab Test 03/31/22  1248 09/30/21  1347 10/29/20  1410 07/30/20  1331 12/19/19  1405 06/13/19  1400 03/28/19  1501   UTPG 0.16 0.16 0.12 0.12 0.11 0.11 0.10         MRA [facility] 4/5/22  History: Cerebral aneurysm, follow-up; Cerebral aneurysm.  ICD-10: Cerebral aneurysm     Comparison:  MRA 4/10/2019      Technique: Noncontrast 3D time-of-flight MRA of  the head.     Findings:     Head MRA demonstrates patent major intracranial arteries. No evidence  of flow within treated anterior communicating artery aneurysm. The  anterior communicating artery appears patent. Stable right P1  posterior cerebral artery segment posteromedially oriented focal  outpouching measuring approximately 1 mm from dome to base with a 1 mm  wide neck. This appears to be an infundibulum, with vessel arising  from the apex. In addition, there is a similar additional infundibulum  near the origin of the left superior cerebellar artery. No new focal  outpouching or aneurysm.     There is no high flow vascular malformation. Fetal origin of the left  posterior cerebral artery.                                                                    Impression:   1.  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 No  new aneurysm.  2.  No definite flow seen in treated anterior communicating artery  aneurysm.     I have personally reviewed the examination and initial interpretation  and I agree with the findings.     DREAD WESTBROOK MD       CARDIAC STRESS ECHOCARDIOGRAM (11/04/2021 1:13 PM CDT)  Miscellaneous Results - CARDIAC STRESS ECHOCARDIOGRAM (11/04/2021 1:13 PM CDT)  Specimen (Source) Anatomical Location / Laterality Collection Method / Volume Collection Time Received Time         11/04/2021 1:13 PM CDT       Miscellaneous Results - CARDIAC STRESS ECHOCARDIOGRAM (11/04/2021 1:13 PM CDT)  Narrative   PROSOLV - 11/04/2021 2:43 PM CDT   Summary   1. The patient exercised for 8 minutes and 25 seconds on the  Preston  protocol.   2. Frequent PACs at rest and with stress, PVCs at peak exercise.   3. Good functional capacity for age. No chest pain.   4. Normal left ventricular systolic function with no regional wall motion  abnormalities noted at rest.   LVEF 60%.   5. Post stress, no wall motion abnormalities seen.   LV  systolic size  decreases and systolic function increases appropriately.   6. Normal stress echocardiogram.   7. Mildly dilated aortic root of 4.2 cm is incidentally noted on the  resting  images.      Report Signatures  Stress ECG Finalized by Nadine Kang on 11/4/2021 02:42 PM  Echo Finalized by Nadine Kang on 11/4/2021 02:42 PM      Miscellaneous Results - CARDIAC STRESS ECHOCARDIOGRAM (11/04/2021 1:13 PM CDT)  Procedure Note   Nadine Kang MD - 11/04/2021   Formatting of this note might be different from the original.  Summary  1. The patient exercised for 8 minutes and 25 seconds on the Preston  protocol.  2. Frequent PACs at rest and with stress, PVCs at peak exercise.  3. Good functional capacity for age. No chest pain.  4. Normal left ventricular systolic function with no regional wall motion  abnormalities noted at rest. LVEF 60%.  5. Post stress, no wall motion abnormalities seen. LV systolic size  decreases and systolic function increases appropriately.  6. Normal stress echocardiogram.  7. Mildly dilated aortic root of 4.2 cm is incidentally noted on the  resting  images.      Report Signatures  Stress ECG Finalized by Nadine Kang on 11/4/2021 02:42 PM  Echo Finalized by Nadine Kang on 11/4/2021 02:42 PM      ASSESSMENT AND PLAN:   Mr Yeager presents with PKD. Previously, we reviewed together the results of the MRI, the estimated TKV, and the estimated anticipated rate of progression of CKD given the current data. It was explained that he would most likely not benefit from Tolvaptan.    Nov 1, 2024  His renal function remains  Stable and well preserved.     BP:  BP under better control.  I made no changes to his meds today     Cerebral aneurysm: the repeat MRI did not show a significant change in the previously observed aneurysm -> next MRA tentatively planned for ~ 2029.    Return to clinic in 6 months    Feroz Maria MD  Division of Nephrology and Hypertension

## 2024-11-01 NOTE — LETTER
11/1/2024      RE: Tariq Yeager  [address]  [place] [address]     Dear Colleague,    Thank you for the opportunity to participate in the care of your patient, Tariq Yeager, at the [facility] at [facility]. Please see a copy of my visit note below.    Nephrology Clinic    Tariq Yeager MRN:[id number] YOB: 1945    REASON FOR FOLLOW UP: Polycystic kidney disease    HISTORY OF PRESENT ILLNESS:  Date of initial Service: October 29, 2020  Tariq Yeager is a 73 year old male who presented initially for multiple kidney cysts, consistent with PKD.   Mr Yeager has recently been informed about the presence of bilateral renal cysts which were incidentally identified by MRI of the spine he received in early February 2018 for back pain.  The presence of bilateral renal cysts was confirmed by ultrasound also obtained recently. (2/11/19 [facility])(see below).  He denies any history of gross hematuria, flank pain, urolithiasis, or UTIs.  He does however have a history of acute sudden incerebral hemorrhage in 2002 for which he had a prolonged ICU hospitalization at [facility] .  Note is made that he also underwent placement of a permanent IVC filter, although Mr Yeager does not recall having had a DVT.  Prior to that event, it does not appear that he was in good health.  He had an inguinal herniorrhaphy on the R and reports a recurrence of the hernia + new Inguinal hernia on the L. No history of diverticulosis     June 13, 2019  Since the last visit, he had an MRI of Brain and Abd.  MRI of the brain revealed a small aneurysm for which I put in a referral to Dr STAN Robles.  Aneurysm was deemed very small and recommended repeat MRI in 2 year.  MRI of Abd revealed an estimated total kidney volume of 1627 ml, with class IB risk  category of progression of ADPKD, with an estimated projected  "eGFR ~ 24 ml/min in 10 years.  He is moderating his caffeine intake, salt intake.    Still swims 3 x a week.  Feels generally well.  No HA.     December 19, 2019  Since last visit, had a cologard test that came back positive. He is scheduled to have a colonoscopy in Jan 2020.  Otherwise feels \"very well physically\" \"totally boring\"  Swims 25 min twice a week, without CP or ESCOBEDO.    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  No L Ext swelling.  No gross hematuria or  symptoms.  Rare diarrhea.  No abd pain.  No HA.  Admits to forgetting his meds on occasion (estimates he takes his meds 5 days a week)  Drinks 6 cups of coffee a day.      July 30, 2020  Since last visit, he notes doing well. No hospitalizations or significant illnesses. No problems with hematuria or dysuria.  No abd pain. Continues to swim regularly, 3-4x per week.   He continues to have chronic headaches, almost nightly, for many years. No new symptoms or change in character. He does snore quite loudly per the wife, but no witnessed apnea. Does not take Tylenol or Advil.  In terms of BP, home BP is running 130-140/80-90. Remains on amlodipine and lisinopril, good adherence.  Of note, he did have a fall while fixing some gutters, and did briefly lose consciousness. Feeling fine now without issue. Did not seek medical attention.     October 29, 2020  24 hour BP monitoring  July 30, 2020  Overall Syst  +/- 11.67  Diast BP 74+/-5.8  Pulse pressure 62+/-10.0   HR 58+/-8.9  Syst > limits 52%, Diast > limit 21%  Please see scanned document for full results  Of Note, most of the that day, he was very active doing repairs \"on a ladder\".       Has new BP machine 122-156  Most > 135  Drinks about 8 x 6 oz of coffee, has cut down to 3 cups a day.  Continues to cut down salt, paying attention to sodium content of foods.  Continues to swim 3 x a week, currently doing 14 laps + exersices,  Plus once a week aerobic exercise.    September 30, 2021  Overall feeling well. " Blood pressure control sub-optimal with systolic blood pressure ranging between 130-150. Is trying to cut down on caffeine intake. Down to 4 cups a day. No interim hospitalization or change in meds. Started taking Lisinopril 30 mg about a week ago (before this was taking 20 mg).     ROS;   Occasional mild flank pain.  Leg cramps + (trying Tonic water - has quinine, which he reports helps)  No GI symptoms.  No  symptoms,   Stream may have slowed down.  No L Ext swelling.  No Change in L inguinal hernia.        March 31, 2022  Feels generally well.  To have repeat MRI of brain next week to follow up on small aneurysm seen 2 years ago.  No GI or  symptoms  Had nocturnal pressure CP, recurrent. -> had stress echo in Nov 2021 which was normal  Taking losartan 50 mg (instead of 100)    January 18, 2024  Mr Tavares presents with no acute complaints.  He feels generally well and remains physically active.  He raised questions about his L inguinal hernia which he discussed with his PCP, but did not have evaluated by surgery.  It has caused no acute pain and is easily reducible by his description.  He then reported some RLQ localized pain superior to the inguinal ligament, without a visible or palpable bulge.   He otherwise has no HA, gross hematuria, dysuria or GI complaints.  No SOB or CP.   BP at home remains suboptimally controlled with SBP usually > 140.  No L Ext swelling      May 9, 2024  Had L inguinal hernia repair Feb 29  Has suprapubic discomfort and saw Dr Ware today.  Feels that BP is not as good on Losartan, would like to go back to lisinopril.  At home -163.  He does not want to take hydrochlorothiazide   No CP, SOB, flank pain  Does have pain in right inguinal area as well.  No L Ext swelling.  No gross hematuria.    November 1, 2024  Feels generally well.  He had a L inguinal hernia repaired by Dr Ware on 2/26/24   He also underwent cataract surgery right eye on 6/26/24  He had a virtual visit  with Dr Robles, neurosurgery on 4/30/24.  Repeat MRA without significant change from previous. It was determined that he could have the next repeat MRA in 5 years (around 4/2029)  He denies any gross hematuria or flank pain. No L Ext swelling.   No GI or  symptoms.    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 90 tablet 3     hydrochlorothiazide (HYDRODIURIL) 25 MG tablet Take 1 tablet (25 mg) by mouth daily 90 tablet 3     lisinopril (ZESTRIL) 40 MG tablet Take 1 tablet (40 mg) by mouth daily. 90 tablet 3     sildenafil (REVATIO) 20 MG tablet   See Instructions, Instructions: TAKE THREE TO FIVE TABLETS BY MOUTH 30 MINUTES PRIOR TO SEXUAL ACTIVITY, # 20 tab(s), 0 Refill(s), Pharmacy: [id number], TAKE THREE TO FIVE TABLETS BY MOUTH 30 MINUTES PRIOR TO SEXUAL ACTIVITY, 67, in, 10/25...       HYDROcodone-acetaminophen (NORCO) 5-325 MG tablet Take 1-2 tablets by mouth every 4 hours as needed for moderate to severe pain 15 tablet 0     senna-docusate (SENOKOT-S/PERICOLACE) 8.6-50 MG tablet Take 1-2 tablets by mouth 2 times daily 15 tablet 0         ALLERGIES:    Allergies   Allergen Reactions     Lisinopril      Other reaction(s): itching     Dilantin  "[Phenytoin] Rash     REVIEW OF SYSTEMS:  A comprehensive review of systems was performed and found to be negative except as described here or above.     SOCIAL HISTORY:   Social History     Socioeconomic History     Marital status:      Spouse name: Not on file     Number of children: Not on file     Years of education: Not on file     Highest education level: Not on file   Occupational History     Not on file   Social Needs     Financial resource strain: Not on file     Food insecurity:     Worry: Not on file     Inability: Not on file     Transportation needs:     Medical: Not on file     Non-medical: Not on file   Tobacco Use     Smoking status: Never Smoker     Smokeless tobacco: Never Used   Substance and Sexual Activity     Alcohol use: No     Frequency: Never.  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  Remote history of use > 40 yrs ago     Sexual activity: Not on file   Lifestyle     Physical activity:     Days per week: Swims 3x/week     Minutes per session: 25 min     Stress: Not on file   Relationships   Other Topics Concern     Not on file   Social History Narrative     Not on file     FAMILY MEDICAL HISTORY:   Family History   Problem Relation Age of Onset     Kidney failure Mother      Cystic Kidney Disease Sister    Sister (70) was recently diagnosed with PKD by MRI, with multiple bilat cyst  Brother (77) with possible PKD (with few cysts on one side only)  Sister (75) does not want to be evaluated, but used to run marathons, and still runs half-marathons.    PHYSICAL EXAM:   /76 (BP Location: Right arm, Patient Position: Sitting, Cuff Size: Adult Regular)   Pulse 62   Ht 5' 7.52\" (171.5 cm)   Wt 154 lb 6.4 oz (70 kg)   BMI 23.81 kg/m     GENERAL APPEARANCE: alert and no distress  EYES: nonicteric  NECK: supple, no adenopathy.   RESP: lungs clear to auscultation   CV: regular rhythm, normal rate, no rub  ABDOMEN: soft,     Extremities: no edema  MS: no " evidence of inflammation in joints, no muscle tenderness  SKIN: no rash  NEURO: mentation intact and speech normal  PSYCH: affect normal/bright     LABS:   CMP  Recent Labs   Lab Test 11/01/24  1050 07/01/24  1538 05/09/24  1251 01/18/24  1209 03/31/22  1245 09/30/21  1340 10/29/20  1407 07/30/20  1328 12/19/19  1416 06/13/19  1354 06/13/19  1354 04/05/19  1249 03/28/19  1456     --  140 141 146*   < > 142 138 141  --  140   < > 142   POTASSIUM 3.7  --  4.3 4.3 4.2   < > 3.8 3.8 4.0  --  4.1   < > 4.0   CHLORIDE 102  --  108* 107 111*   < > 110* 106 109  --  109   < > 109   CO2 28  --  23 26 28   < > 27 27 27  --  26   < > 27   ANIONGAP 9  --  9 8 7   < > 5 5 6  --  5  --  5   GLC 83  --  99 104* 108*   < > 88 102* 96  --  91   < > 106*   BUN 25.6*  --  26.8* 26.2* 25   < > 30 27 27  --  29   < > 31*   CR 1.41* 1.5* 1.25* 1.26* 1.22   < > 1.22 1.26* 1.21  --  1.28*   < > 1.17   GFRESTIMATED 52* 48* 60* 59* 62   < > 58* 56* 59*  --  56*   < > 62   GFRESTBLACK  --   --   --   --   --   --  68 65 69  --  65  --  72   JOHN 9.0  --  8.9 8.9 8.5   < > 8.6 8.2* 8.8  --  8.5   < > 8.7   PHOS 3.1  --  2.5 2.8 2.7   < > 3.1 3.0 2.8   < >  --   --   --    PROTTOTAL  --   --   --   --   --   --   --   --   --   --  6.4*  --  6.7*   ALBUMIN 4.4  --  4.5 4.5 3.9   < > 4.0 3.9 4.2  --  3.9   < > 3.9   BILITOTAL  --   --   --   --   --   --   --   --   --   --  0.3  --  0.3   ALKPHOS  --   --   --   --   --   --   --   --   --   --  63  --  65   AST  --   --   --   --   --   --   --   --   --   --  16  --  18   ALT  --   --   --   --   --   --   --   --   --   --  19  --  24    < > = values in this interval not displayed.     CBC  Recent Labs   Lab Test 11/01/24  1050 05/09/24  1251 01/18/24  1209 03/31/22  1245   HGB 13.0* 13.7 13.9 13.7   WBC 6.3 5.6 5.6 6.9   RBC 3.89* 4.15* 4.28* 4.26*   HCT 37.4* 39.6* 39.9* 40.8   MCV 96 95 93 96   MCH 33.4* 33.0 32.5 32.2   MCHC 34.8 34.6 34.8 33.6   RDW 12.4 12.6 12.8 12.5     202 211 226       Recent Labs   Lab Test 03/31/22  1248 09/30/21  1347 10/29/20  1410 07/30/20  1331 12/19/19  1405 06/13/19  1400 03/28/19  1501   UTPG 0.16 0.16 0.12 0.12 0.11 0.11 0.10         MRA [facility] [place] 4/5/22  History: Cerebral aneurysm, follow-up; Cerebral aneurysm.  ICD-10: Cerebral aneurysm     Comparison:  MRA 4/10/2019      Technique: Noncontrast 3D time-of-flight MRA of the head.     Findings:     Head MRA demonstrates patent major intracranial arteries. No evidence  of flow within treated anterior communicating artery aneurysm. The  anterior communicating artery appears patent. Stable right P1  posterior cerebral artery segment posteromedially oriented focal  outpouching measuring approximately 1 mm from dome to base with a 1 mm  wide neck. This appears to be an infundibulum, with vessel arising  from the apex. In addition, there is a similar additional infundibulum  near the origin of the left superior cerebellar artery. No new focal  outpouching or aneurysm.     There is no high flow vascular malformation. Fetal origin of the left  posterior cerebral artery.                                                                    Impression:   1.  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 No  new aneurysm.  2.  No definite flow seen in treated anterior communicating artery  aneurysm.     I have personally reviewed the examination and initial interpretation  and I agree with the findings.     DREAD WESTBROOK MD       CARDIAC STRESS ECHOCARDIOGRAM (11/04/2021 1:13 PM CDT)  Miscellaneous Results - CARDIAC STRESS ECHOCARDIOGRAM (11/04/2021 1:13 PM CDT)  Specimen (Source) Anatomical Location / Laterality Collection Method / Volume Collection Time Received Time         11/04/2021 1:13 PM CDT       Miscellaneous Results - CARDIAC STRESS ECHOCARDIOGRAM (11/04/2021 1:13 PM CDT)  Narrative   PROSOLV - 11/04/2021 2:43 PM CDT    Summary   1. The patient exercised for 8 minutes and 25 seconds on the  [place]  protocol.   2. Frequent PACs at rest and with stress, PVCs at peak exercise.   3. Good functional capacity for age. No chest pain.   4. Normal left ventricular systolic function with no regional wall motion  abnormalities noted at rest.   LVEF 60%.   5. Post stress, no wall motion abnormalities seen.   LV systolic size  decreases and systolic function increases appropriately.   6. Normal stress echocardiogram.   7. Mildly dilated aortic root of 4.2 cm is incidentally noted on the  resting  images.      Report Signatures  Stress ECG Finalized by Nadine Kang on 11/4/2021 02:42 PM  Echo Finalized by Nadine Kang on 11/4/2021 02:42 PM      Miscellaneous Results - CARDIAC STRESS ECHOCARDIOGRAM (11/04/2021 1:13 PM CDT)  Procedure Note   Nadine Kang MD - 11/04/2021   Formatting of this note might be different from the original.  Summary  1. The patient exercised for 8 minutes and 25 seconds on the [place]  protocol.  2. Frequent PACs at rest and with stress, PVCs at peak exercise.  3. Good functional capacity for age. No chest pain.  4. Normal left ventricular systolic function with no regional wall motion  abnormalities noted at rest. LVEF 60%.  5. Post stress, no wall motion abnormalities seen. LV systolic size  decreases and systolic function increases appropriately.  6. Normal stress echocardiogram.  7. Mildly dilated aortic root of 4.2 cm is incidentally noted on the  resting  images.      Report Signatures  Stress ECG Finalized by Nadine Kang on 11/4/2021 02:42 PM  Echo Finalized by Nadine Kang on 11/4/2021 02:42 PM      ASSESSMENT AND PLAN:   Mr Yeager presents with PKD. Previously, we reviewed together the results of the MRI, the estimated TKV, and the estimated anticipated rate of progression of CKD given the current data. It was explained that he would most likely not benefit from Tolvaptan.    Nov 1, 2024  His  renal function remains  Stable and well preserved.     BP:  BP under better control.  I made no changes to his meds today     Cerebral aneurysm: the repeat MRI did not show a significant change in the previously observed aneurysm -> next MRA tentatively planned for ~ 2029.    Return to clinic in 6 months    Feroz Maria MD  Division of Nephrology and Hypertension              Please do not hesitate to contact me if you have any questions/concerns.     Sincerely,       Feroz Maria MD

## 2024-11-25 ENCOUNTER — PATIENT OUTREACH (OUTPATIENT)
Dept: NEPHROLOGY | Facility: CLINIC | Age: 79
End: 2024-11-25
Payer: COMMERCIAL

## 2024-11-25 DIAGNOSIS — Q61.2 ADPKD (AUTOSOMAL DOMINANT POLYCYSTIC KIDNEY DISEASE): ICD-10-CM

## 2024-11-25 DIAGNOSIS — I12.9 HYPERTENSION, RENAL: ICD-10-CM

## 2024-11-25 RX ORDER — HYDROCHLOROTHIAZIDE 25 MG/1
25 TABLET ORAL DAILY
Qty: 90 TABLET | Refills: 3 | Status: SHIPPED | OUTPATIENT
Start: 2024-11-25

## 2024-11-26 NOTE — PROGRESS NOTES
[facility] Nephrology Note: Medication Refill Request    Medication Refill Request:     Medication/Dose/Frequency: Hydrochlorothiazide 25mg daily  Preferred Pharmacy: [facility]  Provider:  Dr. Maria                        SITUATION/BACKROUND:                Last neph office visit: 11/2/24       Future neph office visit: [facility] clinic May 2025 once clinic is opened.    ASSESSMENT:     Neph Assessments:    Recent Labs:    Last Renal Panel:  Sodium   Date Value Ref Range Status   11/01/2024 139 135 - 145 mmol/L Final   10/29/2020 142 133 - 144 mmol/L Final     Potassium   Date Value Ref Range Status   11/01/2024 3.7 3.4 - 5.3 mmol/L Final   03/31/2022 4.2 3.4 - 5.3 mmol/L Final   10/29/2020 3.8 3.4 - 5.3 mmol/L Final     Chloride   Date Value Ref Range Status   11/01/2024 102 98 - 107 mmol/L Final   03/31/2022 111 (H) 94 - 109 mmol/L Final   10/29/2020 110 (H) 94 - 109 mmol/L Final     Carbon Dioxide   Date Value Ref Range Status   10/29/2020 27 20 - 32 mmol/L Final     Carbon Dioxide (CO2)   Date Value Ref Range Status   11/01/2024 28 22 - 29 mmol/L Final   03/31/2022 28 20 - 32 mmol/L Final     Anion Gap   Date Value Ref Range Status   11/01/2024 9 7 - 15 mmol/L Final   03/31/2022 7 3 - 14 mmol/L Final   10/29/2020 5 3 - 14 mmol/L Final     Glucose   Date Value Ref Range Status   11/01/2024 83 70 - 99 mg/dL Final   03/31/2022 108 (H) 70 - 99 mg/dL Final   10/29/2020 88 70 - 99 mg/dL Final     Urea Nitrogen   Date Value Ref Range Status   11/01/2024 25.6 (H) 8.0 - 23.0 mg/dL Final   03/31/2022 25 7 - 30 mg/dL Final   10/29/2020 30 7 - 30 mg/dL Final     Creatinine   Date Value Ref Range Status   11/01/2024 1.41 (H) 0.67 - 1.17 mg/dL Final   10/29/2020 1.22 0.66 - 1.25 mg/dL Final     GFR Estimate   Date Value Ref Range Status   11/01/2024 52 (L) >60 mL/min/1.73m2 Final     Comment:     eGFR calculated using 2021 CKD-EPI equation.   10/29/2020 58 (L) >60 mL/min/[1.73_m2] Final     Comment:     Non   American GFR Calc  Starting 12/18/2018, serum creatinine based estimated GFR (eGFR) will be   calculated using the Chronic Kidney Disease Epidemiology Collaboration   (CKD-EPI) equation.       GFR, ESTIMATED POCT   Date Value Ref Range Status   07/01/2024 48 (L) >60 mL/min/1.73m2 Final     Calcium   Date Value Ref Range Status   11/01/2024 9.0 8.8 - 10.4 mg/dL Final     Comment:     Reference intervals for this test were updated on 7/16/2024 to reflect our healthy population more accurately. There may be differences in the flagging of prior results with similar values performed with this method. Those prior results can be interpreted in the context of the updated reference intervals.   10/29/2020 8.6 8.5 - 10.1 mg/dL Final     Phosphorus   Date Value Ref Range Status   11/01/2024 3.1 2.5 - 4.5 mg/dL Final   10/29/2020 3.1 2.5 - 4.5 mg/dL Final     Albumin   Date Value Ref Range Status   11/01/2024 4.4 3.5 - 5.2 g/dL Final   03/31/2022 3.9 3.4 - 5.0 g/dL Final   10/29/2020 4.0 3.4 - 5.0 g/dL Final       Liver Function Studies:  Recent Labs   Lab Test 11/01/24  1050 12/19/19  1416 06/13/19  1354   PROTTOTAL  --   --  6.4*   ALBUMIN 4.4   < > 3.9   BILITOTAL  --   --  0.3   ALKPHOS  --   --  63   AST  --   --  16   ALT  --   --  19    < > = values in this interval not displayed.     CBC Results:  Recent Labs   Lab Test 11/01/24  1050   WBC 6.3   RBC 3.89*   HGB 13.0*   HCT 37.4*   MCV 96   MCH 33.4*   MCHC 34.8   RDW 12.4        Urinalysis:  Color Urine (no units)   Date Value   11/01/2024 Light Yellow   10/29/2020 Yellow     Appearance Urine (no units)   Date Value   11/01/2024 Clear   10/29/2020 Slightly Cloudy     Glucose Urine (mg/dL)   Date Value   11/01/2024 Negative   10/29/2020 Negative     Bilirubin Urine (no units)   Date Value   11/01/2024 Negative   10/29/2020 Negative     Ketones Urine (mg/dL)   Date Value   11/01/2024 Negative   10/29/2020 Negative     Specific Gravity Urine (no units)   Date Value    11/01/2024 1.011   10/29/2020 1.019     pH Urine   Date Value   11/01/2024 5.5   10/29/2020 5.0 pH     Protein Albumin Urine (mg/dL)   Date Value   11/01/2024 Negative   10/29/2020 Negative     Nitrite Urine (no units)   Date Value   11/01/2024 Negative   10/29/2020 Negative     Leukocyte Esterase Urine (no units)   Date Value   11/01/2024 Negative   10/29/2020 Negative         Current Medication List:   Current Outpatient Medications (Antihypertensive, Cardiovascular, Diuretics, Beta blockers, Calcium blockers, Anticoagulants)   Medication Sig    amLODIPine (NORVASC) 10 MG tablet Take 1 tablet (10 mg) by mouth daily.    hydrochlorothiazide (HYDRODIURIL) 25 MG tablet Take 1 tablet (25 mg) by mouth daily    lisinopril (ZESTRIL) 40 MG tablet Take 1 tablet (40 mg) by mouth daily.    sildenafil (REVATIO) 20 MG tablet   See Instructions, Instructions: TAKE THREE TO FIVE TABLETS BY MOUTH 30 MINUTES PRIOR TO SEXUAL ACTIVITY, # 20 tab(s), 0 Refill(s), Pharmacy: [facility] #[id number], TAKE THREE TO FIVE TABLETS BY MOUTH 30 MINUTES PRIOR TO SEXUAL ACTIVITY, 67, in, 10/25...     Current Outpatient Medications (Other)   Medication Sig    HYDROcodone-acetaminophen (NORCO) 5-325 MG tablet Take 1-2 tablets by mouth every 4 hours as needed for moderate to severe pain    senna-docusate (SENOKOT-S/PERICOLACE) 8.6-50 MG tablet Take 1-2 tablets by mouth 2 times daily       Has patient had kidney transplant in the prior 1 year: no.   Has patient been seen the last 12 months: Yes.  Associated labs reviewed for medication: Yes    PLAN:     Medication refilled per protocol: Yes    Dorita Lama RN

## 2024-12-18 NOTE — PROGRESS NOTES
Planning to discharge home on POD 1 in the morning with his wife, daughter, and grandson helping and staying with him after surgery. He has arranged home care PT through [facility] in [place], [place].       12/18/24 [id number]   Discharge Planning   Patient/Family Anticipates Transition to home with family  (home care PT scheduled through [facility] in [place])   Concerns to be Addressed all concerns addressed in this encounter   Living Arrangements   People in Home spouse   Type of Residence Private Residence   Is your private residence a single family home or apartment? Single family home   Number of Stairs, Within Home, Primary none   Once home, are you able to live on one level? Yes   Which level? Main Level   Bathroom Shower/Tub Tub/Shower unit   Equipment Currently Used at Home shower chair;grab bar, tub/shower;grab bar, toilet;raised toilet seat  (Has 2 walkers at home)   Support System   Support Systems Spouse/Significant Other;Children  (wife, Sue Amado, and grandson, Abdelrahman (24), and daughter, Dara, will all be helping after surgery)   Do you have someone available to stay with you one or two nights once you are home? Yes   Education   Patient attended total joint pre-op class/received pre-op teaching  email/phone call

## 2024-12-30 NOTE — TREATMENT PLAN
Orthopedic Surgery Pre-Op Plan: Tariq Yeager  pre-op review. This is NOT an H&P   Surgeon:  [facility]: [facility]  Name of Surgery: Left Total Knee Arthroplasty  Date of Surgery: 12/31/24  H&P: Completed on 12/17/24 by Dr. Vipul Palomo at [facility].   History of ASA, NSAIDS, vitamin and/or herbal supplements, GLP-1 Agonist or SGLT Inhibitor medication taken within 10 days?: No  History of blood thinners?: No    Plan:   1) Discharge Plan: Home POD 1 with assist of Family (Spouse, Daughter, Grandson) will all be taking turns helping after surgery. Please see Discharge Planning section near bottom of this note for further details.     2) History of Ruptured Cerebral Aneurysm: S/P Coil Embolization in 2002: Follows with Neurology. Reportedly stable since embolization procedure in 2002. I do not have access to his recent Neurology notes.     3) Cerebral Aneurysm: reportedly stable.  Per notes from PCP, patient has chronic, stable aneurysm in both P1 and MCA that is followed every 2 years by Neurology with serial MR angiograms and has not significantly changed in several years. I do not have access to his recent Neurology notes.      4) History of DVT with IVC Filter in place: appears this was a provoked DVT following surgery and hospitalization for ruptured cerebral aneurysm in 2002.  Reportedly still has IVC filter in place from 2002.  Denies any further blood clots since then.  Not on chronic anticoagulation.    5) Hyperlipidemia: Not currently on statin or other cholesterol-lowering medication.    6) Hypertension: BP elevated at 162/85 at preop exam on 12/17/2024 but improved to 144/78 on recheck 12/23/2024.  On amlodipine, lisinopril, and hydrochlorothiazide.  Patient was instructed to hold lisinopril and hydrochlorothiazide on the morning of surgery but to continue taking amlodipine.    7) Polycystic Kidney Disease: Renal function stable.  Creatinine 1.3, GFR 57, BUN 30 on 12/17/2024.      8) Benign Prostatic Hyperplasia (BPH): At increased risk for postop urinary retention.  I recommend close monitoring with frequent bladder scanning as needed.  If patient has issues with postop urinary retention could consider trial of tamsulosin with straight catheterization as needed.     Patient appears medically optimized for upcoming surgery. I would recommend Hospitalist Consult to assist with medical management. Please call me below with any questions on this patient.       Review of Systems Notable for: History of ruptured cerebral aneurysm-s/p coil embolization in 2002-follows with neurology, cerebral aneurysm-reportedly stable-monitored by neurology with serial MR angiograms every 2 years, history of DVT in 2002 with IVC filter in place, hyperlipidemia, hypertension, polycystic kidney disease, benign prostatic hyperplasia.    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    Eczema     ED (erectile dysfunction)     History of aneurysm     small brain aneurysm being montired by Neurology    Hyperlipidemia     Hypertension 2002    Inguinal hernia 2016    Presence of IVC filter 2002    Thrombosis 2002    DVT when hospitalized     Past Surgical History:   Procedure Laterality Date    CATARACT EXTRACTION Right 06/26/2024    CATARACT EXTRACTION Left 07/24/2024    cerebral aneurysm coil  2002    CRANIOTOMY  2002    HERNIORRHAPHY INGUINAL Right 2016    HERNIORRHAPHY INGUINAL Left 02/26/2024    Procedure: HERNIORRHAPHY, INGUINAL, OPEN LEFT;  Surgeon: Ra Ware MD;  Location: [facility] OR       Current Medications:  Patient's Medications   New Prescriptions    No medications on file   Previous Medications     AMLODIPINE (NORVASC) 10 MG TABLET    Take 1 tablet (10 mg) by mouth daily.    HYDROCHLOROTHIAZIDE (HYDRODIURIL) 25 MG TABLET    Take 1 tablet (25 mg) by mouth daily.    LISINOPRIL (ZESTRIL) 40 MG TABLET    Take 1 tablet (40 mg) by mouth daily.    SILDENAFIL (REVATIO) 20 MG TABLET      See Instructions, Instructions: TAKE THREE TO FIVE TABLETS BY MOUTH 30 MINUTES PRIOR TO SEXUAL ACTIVITY, # 20 tab(s), 0 Refill(s), Pharmacy: [facility] #[id number], TAKE THREE TO FIVE TABLETS BY MOUTH 30 MINUTES PRIOR TO SEXUAL ACTIVITY, 69, in, 10/25...   Modified Medications    No medications on file   Discontinued Medications    HYDROCODONE-ACETAMINOPHEN (NORCO) 5-325 MG TABLET    Take 1-2 tablets by mouth every 4 hours as needed for moderate to severe pain    SENNA-DOCUSATE (SENOKOT-S/PERICOLACE) 8.6-50 MG TABLET    Take 1-2 tablets by mouth 2 times daily       ALLERGIES:  Allergies   Allergen Reactions    Iodinated Contrast Media Rash    Dilantin [Phenytoin] Rash       Social History  Social History     Tobacco Use    Smoking status: Never    Smokeless tobacco: Never   Substance Use Topics    Alcohol use: No    Drug use: No       Any Abnormal Recent Diagnostics? Yes  Creatinine 1.3, GFR 57, BUN 30 on 12/17/2024: History of polycystic kidney disease with stable renal function.     Discharge Planning:     Planning to discharge home on POD 1 in the morning with his wife, daughter, and grandson helping and staying with him after surgery. He has arranged home care PT through [facility] in [place], [place].     12/18/24 5721   Discharge Planning   Patient/Family Anticipates Transition to home with family  (home care PT scheduled through [facility] in [place])   Concerns to be Addressed all concerns addressed in this encounter   Living Arrangements   People in Home spouse   Type of Residence Private Residence   Is your private residence a single family home or apartment? Single family home   Number of Stairs, Within Home, Primary none   Once home,  are you able to live on one level? Yes   Which level? Main Level   Bathroom Shower/Tub Tub/Shower unit   Equipment Currently Used at Home shower chair;grab bar, tub/shower;grab bar, toilet;raised toilet seat  (Has 2 walkers at home)   Support System   Support Systems Spouse/Significant Other;Children  (wife, Sue Amado, and grandson, Abdelrahman (24), and daughter, Dara, will all be helping after surgery)   Do you have someone available to stay with you one or two nights once you are home? Yes   Education   Patient attended total joint pre-op class/received pre-op teaching  email/phone call       IGLESIA Saunders, CNP   Advanced Practice Nurse Navigator- Orthopedics  [facility]   Phone: [phone]

## 2024-12-31 ENCOUNTER — ANESTHESIA (OUTPATIENT)
Dept: SURGERY | Facility: CLINIC | Age: 79
End: 2024-12-31
Payer: MEDICARE

## 2024-12-31 ENCOUNTER — HOSPITAL ENCOUNTER (OUTPATIENT)
Facility: CLINIC | Age: 79
Discharge: HOME | End: 2025-01-02
Attending: ORTHOPAEDIC SURGERY | Admitting: ORTHOPAEDIC SURGERY
Payer: MEDICARE

## 2024-12-31 ENCOUNTER — ANCILLARY PROCEDURE (OUTPATIENT)
Dept: ULTRASOUND IMAGING | Facility: CLINIC | Age: 79
End: 2024-12-31
Attending: STUDENT IN AN ORGANIZED HEALTH CARE EDUCATION/TRAINING PROGRAM
Payer: COMMERCIAL

## 2024-12-31 ENCOUNTER — ANESTHESIA EVENT (OUTPATIENT)
Dept: SURGERY | Facility: CLINIC | Age: 79
End: 2024-12-31
Payer: MEDICARE

## 2024-12-31 DIAGNOSIS — M17.12 PRIMARY OSTEOARTHRITIS OF LEFT KNEE: Primary | ICD-10-CM

## 2024-12-31 PROCEDURE — 999N000141 HC STATISTIC PRE-PROCEDURE NURSING ASSESSMENT: Performed by: ORTHOPAEDIC SURGERY

## 2024-12-31 PROCEDURE — C1713 ANCHOR/SCREW BN/BN,TIS/BN: HCPCS | Performed by: ORTHOPAEDIC SURGERY

## 2024-12-31 PROCEDURE — 258N000001 HC RX 258: Performed by: ORTHOPAEDIC SURGERY

## 2024-12-31 PROCEDURE — 250N000009 HC RX 250: Performed by: NURSE ANESTHETIST, CERTIFIED REGISTERED

## 2024-12-31 PROCEDURE — 258N000003 HC RX IP 258 OP 636: Performed by: STUDENT IN AN ORGANIZED HEALTH CARE EDUCATION/TRAINING PROGRAM

## 2024-12-31 PROCEDURE — C1776 JOINT DEVICE (IMPLANTABLE): HCPCS | Performed by: ORTHOPAEDIC SURGERY

## 2024-12-31 PROCEDURE — 710N000010 HC RECOVERY PHASE 1, LEVEL 2, PER MIN: Performed by: ORTHOPAEDIC SURGERY

## 2024-12-31 PROCEDURE — 250N000013 HC RX MED GY IP 250 OP 250 PS 637

## 2024-12-31 PROCEDURE — 360N000077 HC SURGERY LEVEL 4, PER MIN: Performed by: ORTHOPAEDIC SURGERY

## 2024-12-31 PROCEDURE — 258N000003 HC RX IP 258 OP 636

## 2024-12-31 PROCEDURE — 370N000017 HC ANESTHESIA TECHNICAL FEE, PER MIN: Performed by: ORTHOPAEDIC SURGERY

## 2024-12-31 PROCEDURE — 258N000003 HC RX IP 258 OP 636: Performed by: NURSE ANESTHETIST, CERTIFIED REGISTERED

## 2024-12-31 PROCEDURE — 250N000011 HC RX IP 250 OP 636

## 2024-12-31 PROCEDURE — 250N000011 HC RX IP 250 OP 636: Mod: JW | Performed by: STUDENT IN AN ORGANIZED HEALTH CARE EDUCATION/TRAINING PROGRAM

## 2024-12-31 PROCEDURE — 250N000011 HC RX IP 250 OP 636: Performed by: ORTHOPAEDIC SURGERY

## 2024-12-31 PROCEDURE — 250N000011 HC RX IP 250 OP 636: Performed by: STUDENT IN AN ORGANIZED HEALTH CARE EDUCATION/TRAINING PROGRAM

## 2024-12-31 PROCEDURE — 250N000009 HC RX 250: Performed by: ORTHOPAEDIC SURGERY

## 2024-12-31 PROCEDURE — 250N000013 HC RX MED GY IP 250 OP 250 PS 637: Performed by: ORTHOPAEDIC SURGERY

## 2024-12-31 PROCEDURE — 258N000003 HC RX IP 258 OP 636: Performed by: ORTHOPAEDIC SURGERY

## 2024-12-31 PROCEDURE — 272N000001 HC OR GENERAL SUPPLY STERILE: Performed by: ORTHOPAEDIC SURGERY

## 2024-12-31 PROCEDURE — 64447 NJX AA&/STRD FEMORAL NRV IMG: CPT

## 2024-12-31 PROCEDURE — 250N000011 HC RX IP 250 OP 636: Performed by: NURSE ANESTHETIST, CERTIFIED REGISTERED

## 2024-12-31 DEVICE — SPEEDSET FULL DOSE ANTIBIOTIC BONE CEMENT, 10 PACK CATALOG NUMBER IS 6192-1-010
Type: IMPLANTABLE DEVICE | Site: KNEE | Status: FUNCTIONAL
Brand: SIMPLEX

## 2024-12-31 DEVICE — ATTUNE KNEE SYSTEM TIBIAL BASE FIXED BEARING SIZE 7 CEMENTED
Type: IMPLANTABLE DEVICE | Site: KNEE | Status: FUNCTIONAL
Brand: ATTUNE

## 2024-12-31 DEVICE — ATTUNE KNEE SYSTEM TIBIAL INSERT FIXED BEARING MEDIAL STABILIZED LEFT AOX 6, 7MM
Type: IMPLANTABLE DEVICE | Site: KNEE | Status: FUNCTIONAL
Brand: ATTUNE

## 2024-12-31 DEVICE — ATTUNE PATELLA MEDIALIZED DOME 41MM CEMENTED AOX
Type: IMPLANTABLE DEVICE | Site: KNEE | Status: FUNCTIONAL
Brand: ATTUNE

## 2024-12-31 DEVICE — ATTUNE KNEE SYSTEM FEMORAL CRUCIATE RETAINING SIZE 6 LEFT CEMENTED
Type: IMPLANTABLE DEVICE | Site: KNEE | Status: FUNCTIONAL
Brand: ATTUNE

## 2024-12-31 RX ORDER — DEXAMETHASONE SODIUM PHOSPHATE 10 MG/ML
4 INJECTION, SOLUTION INTRAMUSCULAR; INTRAVENOUS
Status: CANCELLED | OUTPATIENT
Start: 2024-12-31

## 2024-12-31 RX ORDER — ASPIRIN 81 MG/1
81 TABLET ORAL 2 TIMES DAILY
Status: DISCONTINUED | OUTPATIENT
Start: 2024-12-31 | End: 2025-01-02 | Stop reason: HOSPADM

## 2024-12-31 RX ORDER — BISACODYL 10 MG
10 SUPPOSITORY, RECTAL RECTAL DAILY PRN
Status: DISCONTINUED | OUTPATIENT
Start: 2025-01-03 | End: 2025-01-01

## 2024-12-31 RX ORDER — ACETAMINOPHEN 325 MG/1
975 TABLET ORAL ONCE
Status: COMPLETED | OUTPATIENT
Start: 2024-12-31 | End: 2024-12-31

## 2024-12-31 RX ORDER — FLUMAZENIL 0.1 MG/ML
0.2 INJECTION, SOLUTION INTRAVENOUS
Status: DISCONTINUED | OUTPATIENT
Start: 2024-12-31 | End: 2024-12-31 | Stop reason: HOSPADM

## 2024-12-31 RX ORDER — NALOXONE HYDROCHLORIDE 0.4 MG/ML
0.4 INJECTION, SOLUTION INTRAMUSCULAR; INTRAVENOUS; SUBCUTANEOUS
Status: DISCONTINUED | OUTPATIENT
Start: 2024-12-31 | End: 2025-01-02 | Stop reason: HOSPADM

## 2024-12-31 RX ORDER — MAGNESIUM HYDROXIDE 1200 MG/15ML
LIQUID ORAL PRN
Status: DISCONTINUED | OUTPATIENT
Start: 2024-12-31 | End: 2024-12-31 | Stop reason: HOSPADM

## 2024-12-31 RX ORDER — PHENYLEPHRINE HCL IN 0.9% NACL 50MG/250ML
PLASTIC BAG, INJECTION (ML) INTRAVENOUS CONTINUOUS PRN
Status: DISCONTINUED | OUTPATIENT
Start: 2024-12-31 | End: 2024-12-31

## 2024-12-31 RX ORDER — OXYCODONE HYDROCHLORIDE 5 MG/1
5 TABLET ORAL EVERY 4 HOURS PRN
Status: DISCONTINUED | OUTPATIENT
Start: 2024-12-31 | End: 2025-01-01

## 2024-12-31 RX ORDER — CEFAZOLIN SODIUM/WATER 2 G/20 ML
2 SYRINGE (ML) INTRAVENOUS
Status: COMPLETED | OUTPATIENT
Start: 2024-12-31 | End: 2024-12-31

## 2024-12-31 RX ORDER — LIDOCAINE 40 MG/G
CREAM TOPICAL
Status: DISCONTINUED | OUTPATIENT
Start: 2024-12-31 | End: 2024-12-31 | Stop reason: HOSPADM

## 2024-12-31 RX ORDER — ONDANSETRON 2 MG/ML
4 INJECTION INTRAMUSCULAR; INTRAVENOUS EVERY 30 MIN PRN
Status: DISCONTINUED | OUTPATIENT
Start: 2024-12-31 | End: 2024-12-31 | Stop reason: HOSPADM

## 2024-12-31 RX ORDER — ONDANSETRON 2 MG/ML
INJECTION INTRAMUSCULAR; INTRAVENOUS PRN
Status: DISCONTINUED | OUTPATIENT
Start: 2024-12-31 | End: 2024-12-31

## 2024-12-31 RX ORDER — SODIUM CHLORIDE, SODIUM LACTATE, POTASSIUM CHLORIDE, CALCIUM CHLORIDE 600; 310; 30; 20 MG/100ML; MG/100ML; MG/100ML; MG/100ML
INJECTION, SOLUTION INTRAVENOUS CONTINUOUS
Status: DISCONTINUED | OUTPATIENT
Start: 2024-12-31 | End: 2025-01-02

## 2024-12-31 RX ORDER — CEFAZOLIN SODIUM/WATER 2 G/20 ML
2 SYRINGE (ML) INTRAVENOUS SEE ADMIN INSTRUCTIONS
Status: DISCONTINUED | OUTPATIENT
Start: 2024-12-31 | End: 2024-12-31 | Stop reason: HOSPADM

## 2024-12-31 RX ORDER — SODIUM CHLORIDE, SODIUM LACTATE, POTASSIUM CHLORIDE, CALCIUM CHLORIDE 600; 310; 30; 20 MG/100ML; MG/100ML; MG/100ML; MG/100ML
INJECTION, SOLUTION INTRAVENOUS CONTINUOUS
Status: DISCONTINUED | OUTPATIENT
Start: 2024-12-31 | End: 2024-12-31 | Stop reason: HOSPADM

## 2024-12-31 RX ORDER — CEFAZOLIN SODIUM 1 G/3ML
1 INJECTION, POWDER, FOR SOLUTION INTRAMUSCULAR; INTRAVENOUS EVERY 8 HOURS
Status: COMPLETED | OUTPATIENT
Start: 2024-12-31 | End: 2025-01-02

## 2024-12-31 RX ORDER — NALOXONE HYDROCHLORIDE 0.4 MG/ML
0.2 INJECTION, SOLUTION INTRAMUSCULAR; INTRAVENOUS; SUBCUTANEOUS
Status: DISCONTINUED | OUTPATIENT
Start: 2024-12-31 | End: 2025-01-02 | Stop reason: HOSPADM

## 2024-12-31 RX ORDER — NALOXONE HYDROCHLORIDE 0.4 MG/ML
0.1 INJECTION, SOLUTION INTRAMUSCULAR; INTRAVENOUS; SUBCUTANEOUS
Status: DISCONTINUED | OUTPATIENT
Start: 2024-12-31 | End: 2024-12-31 | Stop reason: HOSPADM

## 2024-12-31 RX ORDER — HYDROMORPHONE HCL IN WATER/PF 6 MG/30 ML
0.1 PATIENT CONTROLLED ANALGESIA SYRINGE INTRAVENOUS EVERY 4 HOURS PRN
Status: DISCONTINUED | OUTPATIENT
Start: 2024-12-31 | End: 2025-01-01

## 2024-12-31 RX ORDER — ONDANSETRON 2 MG/ML
4 INJECTION INTRAMUSCULAR; INTRAVENOUS EVERY 6 HOURS PRN
Status: DISCONTINUED | OUTPATIENT
Start: 2024-12-31 | End: 2025-01-02 | Stop reason: HOSPADM

## 2024-12-31 RX ORDER — ACETAMINOPHEN 325 MG/1
975 TABLET ORAL EVERY 8 HOURS
Status: DISCONTINUED | OUTPATIENT
Start: 2024-12-31 | End: 2025-01-02

## 2024-12-31 RX ORDER — ONDANSETRON 4 MG/1
4 TABLET, ORALLY DISINTEGRATING ORAL EVERY 6 HOURS PRN
Status: DISCONTINUED | OUTPATIENT
Start: 2024-12-31 | End: 2025-01-02 | Stop reason: HOSPADM

## 2024-12-31 RX ORDER — FENTANYL CITRATE 50 UG/ML
25 INJECTION, SOLUTION INTRAMUSCULAR; INTRAVENOUS EVERY 5 MIN PRN
Status: DISCONTINUED | OUTPATIENT
Start: 2024-12-31 | End: 2024-12-31 | Stop reason: HOSPADM

## 2024-12-31 RX ORDER — HYDROMORPHONE HCL IN WATER/PF 6 MG/30 ML
0.4 PATIENT CONTROLLED ANALGESIA SYRINGE INTRAVENOUS EVERY 5 MIN PRN
Status: DISCONTINUED | OUTPATIENT
Start: 2024-12-31 | End: 2024-12-31 | Stop reason: HOSPADM

## 2024-12-31 RX ORDER — NALOXONE HYDROCHLORIDE 0.4 MG/ML
0.1 INJECTION, SOLUTION INTRAMUSCULAR; INTRAVENOUS; SUBCUTANEOUS
Status: CANCELLED | OUTPATIENT
Start: 2024-12-31

## 2024-12-31 RX ORDER — ONDANSETRON 2 MG/ML
4 INJECTION INTRAMUSCULAR; INTRAVENOUS EVERY 30 MIN PRN
Status: CANCELLED | OUTPATIENT
Start: 2024-12-31

## 2024-12-31 RX ORDER — HYDROMORPHONE HCL IN WATER/PF 6 MG/30 ML
0.2 PATIENT CONTROLLED ANALGESIA SYRINGE INTRAVENOUS EVERY 4 HOURS PRN
Status: DISCONTINUED | OUTPATIENT
Start: 2024-12-31 | End: 2025-01-01

## 2024-12-31 RX ORDER — OXYCODONE HYDROCHLORIDE 5 MG/1
10 TABLET ORAL
Status: CANCELLED | OUTPATIENT
Start: 2024-12-31

## 2024-12-31 RX ORDER — AMOXICILLIN 250 MG
1 CAPSULE ORAL 2 TIMES DAILY
Status: DISCONTINUED | OUTPATIENT
Start: 2024-12-31 | End: 2025-01-02 | Stop reason: HOSPADM

## 2024-12-31 RX ORDER — TRANEXAMIC ACID 650 MG/1
1950 TABLET ORAL ONCE
Status: COMPLETED | OUTPATIENT
Start: 2024-12-31 | End: 2024-12-31

## 2024-12-31 RX ORDER — BUPIVACAINE HYDROCHLORIDE 7.5 MG/ML
INJECTION, SOLUTION INTRASPINAL
Status: COMPLETED | OUTPATIENT
Start: 2024-12-31 | End: 2024-12-31

## 2024-12-31 RX ORDER — PROPOFOL 10 MG/ML
INJECTION, EMULSION INTRAVENOUS CONTINUOUS PRN
Status: DISCONTINUED | OUTPATIENT
Start: 2024-12-31 | End: 2024-12-31

## 2024-12-31 RX ORDER — LIDOCAINE 40 MG/G
CREAM TOPICAL
Status: DISCONTINUED | OUTPATIENT
Start: 2024-12-31 | End: 2025-01-02 | Stop reason: HOSPADM

## 2024-12-31 RX ORDER — POLYETHYLENE GLYCOL 3350 17 G/17G
17 POWDER, FOR SOLUTION ORAL DAILY
Status: DISCONTINUED | OUTPATIENT
Start: 2025-01-01 | End: 2025-01-01

## 2024-12-31 RX ORDER — PROCHLORPERAZINE MALEATE 5 MG/1
5 TABLET ORAL EVERY 6 HOURS PRN
Status: DISCONTINUED | OUTPATIENT
Start: 2024-12-31 | End: 2025-01-02 | Stop reason: HOSPADM

## 2024-12-31 RX ORDER — DEXAMETHASONE SODIUM PHOSPHATE 4 MG/ML
4 INJECTION, SOLUTION INTRA-ARTICULAR; INTRALESIONAL; INTRAMUSCULAR; INTRAVENOUS; SOFT TISSUE
Status: DISCONTINUED | OUTPATIENT
Start: 2024-12-31 | End: 2024-12-31 | Stop reason: HOSPADM

## 2024-12-31 RX ORDER — FENTANYL CITRATE 50 UG/ML
50 INJECTION, SOLUTION INTRAMUSCULAR; INTRAVENOUS EVERY 5 MIN PRN
Status: DISCONTINUED | OUTPATIENT
Start: 2024-12-31 | End: 2024-12-31 | Stop reason: HOSPADM

## 2024-12-31 RX ORDER — CELECOXIB 200 MG/1
400 CAPSULE ORAL ONCE
Status: COMPLETED | OUTPATIENT
Start: 2024-12-31 | End: 2024-12-31

## 2024-12-31 RX ORDER — ROPIVACAINE HYDROCHLORIDE 5 MG/ML
INJECTION, SOLUTION EPIDURAL; INFILTRATION; PERINEURAL
Status: COMPLETED | OUTPATIENT
Start: 2024-12-31 | End: 2024-12-31

## 2024-12-31 RX ORDER — ONDANSETRON 4 MG/1
4 TABLET, ORALLY DISINTEGRATING ORAL EVERY 30 MIN PRN
Status: DISCONTINUED | OUTPATIENT
Start: 2024-12-31 | End: 2024-12-31 | Stop reason: HOSPADM

## 2024-12-31 RX ORDER — ONDANSETRON 4 MG/1
4 TABLET, ORALLY DISINTEGRATING ORAL EVERY 30 MIN PRN
Status: CANCELLED | OUTPATIENT
Start: 2024-12-31

## 2024-12-31 RX ORDER — OXYCODONE HYDROCHLORIDE 5 MG/1
5 TABLET ORAL
Status: CANCELLED | OUTPATIENT
Start: 2024-12-31

## 2024-12-31 RX ORDER — FENTANYL CITRATE 50 UG/ML
25-100 INJECTION, SOLUTION INTRAMUSCULAR; INTRAVENOUS
Status: DISCONTINUED | OUTPATIENT
Start: 2024-12-31 | End: 2024-12-31 | Stop reason: HOSPADM

## 2024-12-31 RX ORDER — HYDROMORPHONE HCL IN WATER/PF 6 MG/30 ML
0.2 PATIENT CONTROLLED ANALGESIA SYRINGE INTRAVENOUS EVERY 5 MIN PRN
Status: DISCONTINUED | OUTPATIENT
Start: 2024-12-31 | End: 2024-12-31 | Stop reason: HOSPADM

## 2024-12-31 RX ORDER — LORAZEPAM 2 MG/ML
.5-1 INJECTION INTRAMUSCULAR
Status: DISCONTINUED | OUTPATIENT
Start: 2024-12-31 | End: 2024-12-31 | Stop reason: HOSPADM

## 2024-12-31 RX ADMIN — ACETAMINOPHEN 975 MG: 325 TABLET ORAL at 12:51

## 2024-12-31 RX ADMIN — ASPIRIN 81 MG: 81 TABLET, COATED ORAL at 21:23

## 2024-12-31 RX ADMIN — ROPIVACAINE HYDROCHLORIDE 18 ML: 5 INJECTION, SOLUTION EPIDURAL; INFILTRATION; PERINEURAL at 13:41

## 2024-12-31 RX ADMIN — Medication 2 G: at 14:32

## 2024-12-31 RX ADMIN — CEFAZOLIN 1 G: 1 INJECTION, POWDER, FOR SOLUTION INTRAMUSCULAR; INTRAVENOUS at 23:18

## 2024-12-31 RX ADMIN — SODIUM CHLORIDE, POTASSIUM CHLORIDE, SODIUM LACTATE AND CALCIUM CHLORIDE: 600; 310; 30; 20 INJECTION, SOLUTION INTRAVENOUS at 13:10

## 2024-12-31 RX ADMIN — MIDAZOLAM HYDROCHLORIDE 2 MG: 1 INJECTION, SOLUTION INTRAMUSCULAR; INTRAVENOUS at 13:37

## 2024-12-31 RX ADMIN — Medication 0.5 MCG/KG/MIN: at 15:00

## 2024-12-31 RX ADMIN — SENNOSIDES AND DOCUSATE SODIUM 1 TABLET: 50; 8.6 TABLET ORAL at 21:23

## 2024-12-31 RX ADMIN — FENTANYL CITRATE 50 MCG: 50 INJECTION INTRAMUSCULAR; INTRAVENOUS at 13:36

## 2024-12-31 RX ADMIN — ACETAMINOPHEN 975 MG: 325 TABLET ORAL at 21:22

## 2024-12-31 RX ADMIN — SODIUM CHLORIDE, POTASSIUM CHLORIDE, SODIUM LACTATE AND CALCIUM CHLORIDE: 600; 310; 30; 20 INJECTION, SOLUTION INTRAVENOUS at 21:24

## 2024-12-31 RX ADMIN — OXYCODONE 5 MG: 5 TABLET ORAL at 19:56

## 2024-12-31 RX ADMIN — DEXMEDETOMIDINE HYDROCHLORIDE 0.3 MCG: 100 INJECTION, SOLUTION INTRAVENOUS at 15:49

## 2024-12-31 RX ADMIN — PROPOFOL 100 MCG/KG/MIN: 10 INJECTION, EMULSION INTRAVENOUS at 14:38

## 2024-12-31 RX ADMIN — DEXMEDETOMIDINE HYDROCHLORIDE 12 MCG: 100 INJECTION, SOLUTION INTRAVENOUS at 14:41

## 2024-12-31 RX ADMIN — TRANEXAMIC ACID 1950 MG: 650 TABLET ORAL at 12:51

## 2024-12-31 RX ADMIN — BUPIVACAINE HYDROCHLORIDE IN DEXTROSE 1.6 ML: 7.5 INJECTION, SOLUTION SUBARACHNOID at 14:41

## 2024-12-31 RX ADMIN — CELECOXIB 400 MG: 200 CAPSULE ORAL at 12:51

## 2024-12-31 RX ADMIN — SODIUM CHLORIDE, POTASSIUM CHLORIDE, SODIUM LACTATE AND CALCIUM CHLORIDE: 600; 310; 30; 20 INJECTION, SOLUTION INTRAVENOUS at 18:55

## 2024-12-31 RX ADMIN — ONDANSETRON 4 MG: 2 INJECTION INTRAMUSCULAR; INTRAVENOUS at 16:08

## 2024-12-31 ASSESSMENT — ACTIVITIES OF DAILY LIVING (ADL)
ADLS_ACUITY_SCORE: 18
ADLS_ACUITY_SCORE: 24
ADLS_ACUITY_SCORE: 18
ADLS_ACUITY_SCORE: 24
ADLS_ACUITY_SCORE: 18

## 2024-12-31 NOTE — BRIEF OP NOTE
[facility] Brief Operative Note    Pre-operative diagnosis: Osteoarthritis of left knee [M17.12]   Post-operative diagnosis same   Procedure: Procedure(s):  LEFT TOTAL KNEE ARTHROPLASTY   Surgeon: Danyel Em MD   Assistants(s): Latosha Mistry PA-C   Estimated blood loss: Less than 30 ml    Specimens: None   Findings: DJD    Plan:  wbat  23 hr obs  Oxy  Asa 81mg bid x 6 wk  F/u levar care team 10-14 days

## 2024-12-31 NOTE — ANESTHESIA CARE TRANSFER NOTE
Patient: Tariq Yeager    Procedure: Procedure(s):  LEFT TOTAL KNEE ARTHROPLASTY       Diagnosis: Osteoarthritis of left knee [M17.12]  Diagnosis Additional Information: No value filed.    Anesthesia Type:   Spinal     Note:    Oropharynx: oropharynx clear of all foreign objects  Level of Consciousness: drowsy  Oxygen Supplementation: face mask  Level of Supplemental Oxygen (L/min / FiO2): 8  Independent Airway: airway patency satisfactory and stable  Dentition: dentition unchanged  Vital Signs Stable: post-procedure vital signs reviewed and stable  Report to RN Given: handoff report given  Patient transferred to: PACU    Handoff Report: Identifed the Patient, Identified the Reponsible Provider, Reviewed the pertinent medical history, Discussed the surgical course, Reviewed Intra-OP anesthesia mangement and issues during anesthesia, Set expectations for post-procedure period and Allowed opportunity for questions and acknowledgement of understanding      Vitals:  Vitals Value Taken Time   /57 12/31/24 1610   Temp     Pulse 61 12/31/24 1613   Resp 11 12/31/24 1613   SpO2 100 % 12/31/24 1613   Vitals shown include unfiled device data.    Electronically Signed By: IGLESIA Helton CRNA  December 31, 2024  4:15 PM

## 2024-12-31 NOTE — ANESTHESIA PREPROCEDURE EVALUATION
Anesthesia Pre-Procedure Evaluation    Patient: Tariq Yeager   MRN: [id number] : 1945        Procedure : Procedure(s):  LEFT TOTAL KNEE ARTHROPLASTY          Past Medical History:   Diagnosis Date    ADPKD (autosomal dominant polycystic kidney disease) 2019    Arthritis     BPH (benign prostatic hyperplasia)     Cerebral artery occlusion with cerebral infarction (H)     Cerebral hemorrhage (H)     Degenerative joint disease (DJD) of lumbar spine 2019    Eczema     ED (erectile dysfunction)     History of aneurysm     small brain aneurysm being montired by Neurology    Hyperlipidemia     Hypertension     Inguinal hernia     Presence of IVC filter     Thrombosis     DVT when hospitalized      Past Surgical History:   Procedure Laterality Date    CATARACT EXTRACTION Right 2024    CATARACT EXTRACTION Left 2024    cerebral aneurysm coil  2002    CRANIOTOMY  2002    HERNIORRHAPHY INGUINAL Right 2016    HERNIORRHAPHY INGUINAL Left 2024    Procedure: HERNIORRHAPHY, INGUINAL, OPEN LEFT;  Surgeon: Ra Ware MD;  Location: [facility] OR      Allergies   Allergen Reactions    Iodinated Contrast Media Rash    Dilantin [Phenytoin] Rash      Social History     Tobacco Use    Smoking status: Never    Smokeless tobacco: Never   Substance Use Topics    Alcohol use: No      Wt Readings from Last 1 Encounters:   24 69.9 kg (154 lb)        Anesthesia Evaluation            ROS/MED HX  ENT/Pulmonary:  - neg pulmonary ROS     Neurologic:  - neg neurologic ROS   (+)          CVA,  without deficits,  TIA,                  Cardiovascular:  - neg cardiovascular ROS   (+)  hypertension- -   -  - -                                      METS/Exercise Tolerance: >4 METS    Hematologic:  - neg hematologic  ROS     Musculoskeletal:  - neg musculoskeletal ROS     GI/Hepatic:  - neg GI/hepatic ROS     Renal/Genitourinary:  - neg Renal ROS   (+) renal disease, type: CRI,    "         Endo:  - neg endo ROS     Psychiatric/Substance Use:  - neg psychiatric ROS     Infectious Disease:  - neg infectious disease ROS     Malignancy:  - neg malignancy ROS     Other:            Physical Exam    Airway  airway exam normal      Mallampati: II       Respiratory Devices and Support         Dental           Cardiovascular   cardiovascular exam normal          Pulmonary   pulmonary exam normal                OUTSIDE LABS:  CBC:   Lab Results   Component Value Date    WBC 6.3 11/01/2024    WBC 5.6 05/09/2024    HGB 13.0 (L) 11/01/2024    HGB 13.7 05/09/2024    HCT 37.4 (L) 11/01/2024    HCT 39.6 (L) 05/09/2024     11/01/2024     05/09/2024     BMP:   Lab Results   Component Value Date     11/01/2024     05/09/2024    POTASSIUM 3.7 11/01/2024    POTASSIUM 4.3 05/09/2024    CHLORIDE 102 11/01/2024    CHLORIDE 108 (H) 05/09/2024    CO2 28 11/01/2024    CO2 23 05/09/2024    BUN 25.6 (H) 11/01/2024    BUN 26.8 (H) 05/09/2024    CR 1.41 (H) 11/01/2024    CR 1.5 (H) 07/01/2024    GLC 83 11/01/2024    GLC 99 05/09/2024     COAGS: No results found for: \"PTT\", \"INR\", \"FIBR\"  POC: No results found for: \"BGM\", \"HCG\", \"HCGS\"  HEPATIC:   Lab Results   Component Value Date    ALBUMIN 4.4 11/01/2024    PROTTOTAL 6.4 (L) 06/13/2019    ALT 19 06/13/2019    AST 16 06/13/2019    ALKPHOS 63 06/13/2019    BILITOTAL 0.3 06/13/2019     OTHER:   Lab Results   Component Value Date    JOHN 9.0 11/01/2024    PHOS 3.1 11/01/2024       Anesthesia Plan    ASA Status:  3    NPO Status:  NPO Appropriate    Anesthesia Type: Spinal.              Consents    Anesthesia Plan(s) and associated risks, benefits, and realistic alternatives discussed. Questions answered and patient/representative(s) expressed understanding.     - Discussed:     - Discussed with:  Patient            Postoperative Care    Pain management: Peripheral nerve block (Single Shot).   PONV prophylaxis: Ondansetron (or other 5HT-3), " Dexamethasone or Solumedrol     Comments:               Hieu Castro MD    I have reviewed the pertinent notes and labs in the chart from the past 30 days and (re)examined the patient.  Any updates or changes from those notes are reflected in this note.               # Hypertension: Noted on problem list

## 2024-12-31 NOTE — ANESTHESIA PROCEDURE NOTES
"Adductor canal Procedure Note    Pre-Procedure   Staff -        Anesthesiologist:  Hieu Castro MD       Performed By: anesthesiologist       Location: pre-op       Procedure Start/Stop Times: 12/31/2024 1:37 PM and 12/31/2024 1:41 PM       Pre-Anesthestic Checklist: patient identified, IV checked, site marked, risks and benefits discussed, informed consent, monitors and equipment checked, pre-op evaluation, at physician/surgeon's request and post-op pain management  Timeout:       Correct Patient: Yes        Correct Procedure: Yes        Correct Site: Yes        Correct Position: Yes        Correct Laterality: Yes        Site Marked: Yes  Procedure Documentation  Procedure: Adductor canal         Laterality: left       Patient Position: supine       Patient Prep/Sterile Barriers: sterile gloves, mask       Skin prep: Chloraprep       Needle Gauge: 20.        Needle Length (Inches): 4        1. Ultrasound was used to identify targeted nerve, plexus, vascular marker, or fascial plane and place a needle adjacent to it in real-time.       2. Ultrasound was used to visualize the spread of anesthetic in close proximity to the above referenced structure.       3. A permanent image is entered into the patient's record.    Assessment/Narrative         The placement was negative for: blood aspirated, painful injection and site bleeding       Paresthesias: No.    Medication(s) Administered   Ropivacaine 0.5% PF (Infiltration) - Infiltration   18 mL - 12/31/2024 1:41:00 PM  Medication Administration Time: 12/31/2024 1:37 PM      FOR [facility] [place] ONLY:   Pain Team Contact information: please page the Pain Team Via [employer]. Search \"Pain\". During daytime hours, please page the attending first. At night please page the resident first.      "

## 2024-12-31 NOTE — ANESTHESIA PROCEDURE NOTES
"Intrathecal injection Procedure Note    Pre-Procedure   Staff -        Anesthesiologist:  Hieu Castro MD       Performed By: anesthesiologist       Location: OR       Procedure Start/Stop Times: 12/31/2024 2:36 PM and 12/31/2024 2:41 PM       Pre-Anesthestic Checklist: patient identified, IV checked, risks and benefits discussed, informed consent, monitors and equipment checked, pre-op evaluation and at physician/surgeon's request  Timeout:       Correct Patient: Yes        Correct Procedure: Yes        Correct Site: Yes        Correct Position: Yes   Procedure Documentation  Procedure: intrathecal injection         Patient Position: sitting       Patient Prep/Sterile Barriers: sterile gloves, mask       Skin prep: Chloraprep       Insertion Site: L3-4. (midline approach).       Needle Gauge: 24.        Needle Length (Inches): 4        Spinal Needle Type: Pencan       Introducer used       # of attempts: 1 and  # of redirects:     Assessment/Narrative         Paresthesias: No.       CSF fluid: clear.    Medication(s) Administered   0.75% Hyperbaric Bupivacaine (Intrathecal) - Intrathecal   1.6 mL - 12/31/2024 2:41:00 PM  Medication Administration Time: 12/31/2024 2:36 PM      FOR [facility] ([facility] [place] ONLY:   Pain Team Contact information: please page the Pain Team Via [employer]. Search \"Pain\". During daytime hours, please page the attending first. At night please page the resident first.      "

## 2024-12-31 NOTE — ANESTHESIA POSTPROCEDURE EVALUATION
Patient: Tariq Yeager    Procedure: Procedure(s):  LEFT TOTAL KNEE ARTHROPLASTY       Anesthesia Type:  Spinal    Note:  Disposition: Admission   Postop Pain Control: Uneventful            Sign Out: Well controlled pain   PONV: No   Neuro/Psych: Uneventful            Sign Out: Acceptable/Baseline neuro status   Airway/Respiratory: Uneventful            Sign Out: Acceptable/Baseline resp. status   CV/Hemodynamics: Uneventful            Sign Out: Acceptable CV status; No obvious hypovolemia; No obvious fluid overload   Other NRE:    DID A NON-ROUTINE EVENT OCCUR?        Last vitals:  Vitals Value Taken Time   /74 12/31/24 1730   Temp 36  C (96.8  F) 12/31/24 1610   Pulse 47 12/31/24 1737   Resp 26 12/31/24 1737   SpO2 99 % 12/31/24 1737   Vitals shown include unfiled device data.    Electronically Signed By: Kt Lozada MD  December 31, 2024  5:38 PM

## 2024-12-31 NOTE — OP NOTE
OPERATIVE NOTE    Name:  Tariq Yeager  PCP:  Vipul Palomo  Procedure Date:  12/31/2024      Pre-Procedure Diagnosis:  Osteoarthritis of left knee [M17.12]     Post-Procedure Diagnosis:    Same    Procedure(s):  LEFT TOTAL KNEE ARTHROPLASTY     Surgeon(s):    Danyel Em MD    Assist:   Latosha Mistry PA-C    Circulator: Georgina Rodriguez RN  Scrub Person: Mia Fall   A PAC was necessary to ensure safety of this patient and adequate progression of the procedure. This assist was needed for positioning, retracting of soft tissues, helping with placement of components, closure of the incision and application of the dressings.    Anesthesia Type:  spinal    Complications:    None    Condition on discharge from OR:  Good    Estimated Blood Loss:   30ccs    Specimens:    Not sent       Drains: none       Procedure and Findings:  After being informed of risks, benefits, alternatives to the procedure, patient desired to proceed, brought to the operating suite where they were placed under spinal anesthetic.  Patient received 2 grams of intravenous Ancef.      Physician Assistant: was present for the entire length of the case for the purposes of proper patient positioning, surgical exposure, and patient safety.  A time-out verification step was completed.       Attention was directed towards the patella. The tournequet was elevated to 250mm Hg.  He had varus alignment, excellent rom.  A midline incision, 7 cm in length, just medial to the patella and up to the medial 1/3 of the quad and just medial to the tibial tuberical.  The patella was everted.  It was measured at 25 mm.  It was resected, remeasured and a 41 mm symmetric patella was positioned.The patient  had grade 4 wear at the medial aspect of the trochlear groove and all along the medial femoral condyle. A small effusion was noted.   A protector plate was placed on the patella.  Attention was directed toward the distal femur.  IM guide jesus was  placed.  An 9 mm 5 degree distal femoral cut was completed.    Attention was directed towards the distal femur.  It was sized to a 6CR J/J  component.  The 4-in-1 cutting block was placed along the epicondylar axis.  It was secured with 2 pins, anterior, posterior and chamfer cuts were completed.  Soft tissue balancing was then carried out.  Medial release was completed.The extramedullary tibial guide was placed, with the stylus at a 2 mm resection based on the medial tibial plateau.  The slope was made to match the tibial slope.  The oscillating saw was used to create this horizontile cut, preserving the PCL prior with an osteotome to protect. Lamina spreaders then were used to help balance and check the flx and extension gaps and any excess posterior medial meniscus and lateral meniscus were excised.  No aggressive posterior osteophyte was noted. I then sized the tibia to a size 7 and the rotation was set to be in line with the pcl, medial 1/3 of the patella and the first web space.  The IM drill and keel punch then set the rotation.   All components were trialed and the femoral lugs were drilled.  A size 6 CR femur was chosen.  The Patella was noted to track symmetrically throughout the range to motion.  The trial components were then removed, and the PCL and lateral corona and medial corona retractors were placed.   The cancellous surfaces were pulsatile lavaged. 1 batch of Simplex High Viscosity cement was mixed under vacuum.  The tibia, femur and patella were sequentially cemented in place.  The knee was held in extension with axial compression until the cement had hardened.   The 5  mm insert was ultimately selected and secured  Care was taken to remove any excess cement.  Joint capsular injection with Solution 32 was performed.  Excellent range of motion and stability was demonstrated.   The joint was copiously irrigated.  Joint capsules were closed with a running 0 pds stratifix and  interrupted number 1  Ethibond figure-of-8 stitches.  Subcutaneous tissues were closed with2-0 Vicryl and skin was closed with running 3-0 monocryl stratifix and steristrips.  A sterile dressing was applied, aquacel type. Ace wrap and dressings placed.  Patient tolerated the procedure well without complication and returned to the PAR in stable condition.    Danyel Em MD     Date: 12/31/2024  Time: 3:55 PM

## 2024-12-31 NOTE — PHARMACY-ADMISSION MEDICATION HISTORY
Pharmacist Admission Medication History    Admission medication history is complete. The information provided in this note is only as accurate as the sources available at the time of the update.    Information Source(s): Patient and CareEverywhere/SureScripts via in-person    Pertinent Information: None    Allergies reviewed with patient and updates made in EHR: yes    Medication History Completed By: Ferdinand Do [facility] 12/31/2024 12:55 PM    PTA Med List   Medication Sig Last Dose/Taking    amLODIPine (NORVASC) 10 MG tablet Take 1 tablet (10 mg) by mouth daily. 12/31/2024 Morning    hydrochlorothiazide (HYDRODIURIL) 25 MG tablet Take 1 tablet (25 mg) by mouth daily. 12/31/2024 Morning    lisinopril (ZESTRIL) 40 MG tablet Take 1 tablet (40 mg) by mouth daily. 12/30/2024 Morning    sildenafil (REVATIO) 20 MG tablet   See Instructions, Instructions: TAKE THREE TO FIVE TABLETS BY MOUTH 30 MINUTES PRIOR TO SEXUAL ACTIVITY, # 20 tab(s), 0 Refill(s), Pharmacy: [facility] #[id number], TAKE THREE TO FIVE TABLETS BY MOUTH 30 MINUTES PRIOR TO SEXUAL ACTIVITY, 69, in, 10/25... Past Month

## 2025-01-01 ENCOUNTER — APPOINTMENT (OUTPATIENT)
Dept: OCCUPATIONAL THERAPY | Facility: CLINIC | Age: 80
End: 2025-01-01
Attending: ORTHOPAEDIC SURGERY
Payer: MEDICARE

## 2025-01-01 ENCOUNTER — APPOINTMENT (OUTPATIENT)
Dept: PHYSICAL THERAPY | Facility: CLINIC | Age: 80
End: 2025-01-01
Attending: ORTHOPAEDIC SURGERY
Payer: MEDICARE

## 2025-01-01 PROBLEM — M19.90 OA (OSTEOARTHRITIS): Status: ACTIVE | Noted: 2025-01-01

## 2025-01-01 LAB
CREAT SERPL-MCNC: 1.38 MG/DL (ref 0.67–1.17)
EGFRCR SERPLBLD CKD-EPI 2021: 53 ML/MIN/1.73M2
FASTING STATUS PATIENT QL REPORTED: YES
GLUCOSE SERPL-MCNC: 116 MG/DL (ref 70–99)
HGB BLD-MCNC: 12.1 G/DL (ref 13.3–17.7)

## 2025-01-01 PROCEDURE — 36415 COLL VENOUS BLD VENIPUNCTURE: CPT | Performed by: ORTHOPAEDIC SURGERY

## 2025-01-01 PROCEDURE — 250N000011 HC RX IP 250 OP 636

## 2025-01-01 PROCEDURE — 82947 ASSAY GLUCOSE BLOOD QUANT: CPT | Performed by: ORTHOPAEDIC SURGERY

## 2025-01-01 PROCEDURE — 85018 HEMOGLOBIN: CPT | Performed by: ORTHOPAEDIC SURGERY

## 2025-01-01 PROCEDURE — 250N000011 HC RX IP 250 OP 636: Performed by: ORTHOPAEDIC SURGERY

## 2025-01-01 PROCEDURE — 97110 THERAPEUTIC EXERCISES: CPT | Mod: GP

## 2025-01-01 PROCEDURE — 97116 GAIT TRAINING THERAPY: CPT | Mod: GP

## 2025-01-01 PROCEDURE — 82565 ASSAY OF CREATININE: CPT | Performed by: ORTHOPAEDIC SURGERY

## 2025-01-01 PROCEDURE — 250N000013 HC RX MED GY IP 250 OP 250 PS 637: Performed by: ORTHOPAEDIC SURGERY

## 2025-01-01 PROCEDURE — 250N000013 HC RX MED GY IP 250 OP 250 PS 637

## 2025-01-01 PROCEDURE — 97165 OT EVAL LOW COMPLEX 30 MIN: CPT | Mod: GO

## 2025-01-01 PROCEDURE — 97161 PT EVAL LOW COMPLEX 20 MIN: CPT | Mod: GP

## 2025-01-01 PROCEDURE — 97535 SELF CARE MNGMENT TRAINING: CPT | Mod: GO

## 2025-01-01 RX ORDER — HYDROCHLOROTHIAZIDE 25 MG/1
25 TABLET ORAL DAILY
Status: DISCONTINUED | OUTPATIENT
Start: 2025-01-01 | End: 2025-01-02 | Stop reason: HOSPADM

## 2025-01-01 RX ORDER — ACETAMINOPHEN 325 MG/1
975 TABLET ORAL EVERY 8 HOURS
Status: DISCONTINUED | OUTPATIENT
Start: 2025-01-01 | End: 2025-01-01

## 2025-01-01 RX ORDER — SODIUM CHLORIDE, SODIUM LACTATE, POTASSIUM CHLORIDE, CALCIUM CHLORIDE 600; 310; 30; 20 MG/100ML; MG/100ML; MG/100ML; MG/100ML
INJECTION, SOLUTION INTRAVENOUS CONTINUOUS
Status: DISCONTINUED | OUTPATIENT
Start: 2025-01-01 | End: 2025-01-01

## 2025-01-01 RX ORDER — HYDROCODONE BITARTRATE AND ACETAMINOPHEN 5; 325 MG/1; MG/1
1-2 TABLET ORAL EVERY 6 HOURS PRN
Status: DISPENSED | OUTPATIENT
Start: 2025-01-01 | End: 2025-01-02

## 2025-01-01 RX ORDER — POLYETHYLENE GLYCOL 3350 17 G/17G
17 POWDER, FOR SOLUTION ORAL DAILY
Status: DISCONTINUED | OUTPATIENT
Start: 2025-01-02 | End: 2025-01-01

## 2025-01-01 RX ORDER — HYDROMORPHONE HCL IN WATER/PF 6 MG/30 ML
0.2 PATIENT CONTROLLED ANALGESIA SYRINGE INTRAVENOUS EVERY 4 HOURS PRN
Status: DISCONTINUED | OUTPATIENT
Start: 2025-01-01 | End: 2025-01-01

## 2025-01-01 RX ORDER — AMLODIPINE BESYLATE 10 MG/1
10 TABLET ORAL DAILY
Status: DISCONTINUED | OUTPATIENT
Start: 2025-01-01 | End: 2025-01-02 | Stop reason: HOSPADM

## 2025-01-01 RX ORDER — CEFAZOLIN SODIUM 1 G/3ML
1 INJECTION, POWDER, FOR SOLUTION INTRAMUSCULAR; INTRAVENOUS EVERY 8 HOURS
Status: DISCONTINUED | OUTPATIENT
Start: 2025-01-01 | End: 2025-01-01

## 2025-01-01 RX ORDER — ONDANSETRON 2 MG/ML
4 INJECTION INTRAMUSCULAR; INTRAVENOUS EVERY 6 HOURS PRN
Status: DISCONTINUED | OUTPATIENT
Start: 2025-01-01 | End: 2025-01-01

## 2025-01-01 RX ORDER — BISACODYL 10 MG
10 SUPPOSITORY, RECTAL RECTAL DAILY PRN
Status: DISCONTINUED | OUTPATIENT
Start: 2025-01-01 | End: 2025-01-01

## 2025-01-01 RX ORDER — KETOROLAC TROMETHAMINE 15 MG/ML
15 INJECTION, SOLUTION INTRAMUSCULAR; INTRAVENOUS ONCE
Status: COMPLETED | OUTPATIENT
Start: 2025-01-01 | End: 2025-01-01

## 2025-01-01 RX ORDER — OXYCODONE HYDROCHLORIDE 5 MG/1
5 TABLET ORAL EVERY 4 HOURS PRN
Status: DISCONTINUED | OUTPATIENT
Start: 2025-01-01 | End: 2025-01-01

## 2025-01-01 RX ORDER — LIDOCAINE 40 MG/G
CREAM TOPICAL
Status: DISCONTINUED | OUTPATIENT
Start: 2025-01-01 | End: 2025-01-01

## 2025-01-01 RX ORDER — HYDROMORPHONE HCL IN WATER/PF 6 MG/30 ML
0.1 PATIENT CONTROLLED ANALGESIA SYRINGE INTRAVENOUS EVERY 4 HOURS PRN
Status: DISCONTINUED | OUTPATIENT
Start: 2025-01-01 | End: 2025-01-01

## 2025-01-01 RX ORDER — BISACODYL 10 MG
10 SUPPOSITORY, RECTAL RECTAL DAILY PRN
Status: DISCONTINUED | OUTPATIENT
Start: 2025-01-01 | End: 2025-01-02 | Stop reason: HOSPADM

## 2025-01-01 RX ORDER — ASPIRIN 81 MG/1
81 TABLET ORAL 2 TIMES DAILY
Status: DISCONTINUED | OUTPATIENT
Start: 2025-01-01 | End: 2025-01-01

## 2025-01-01 RX ORDER — HYDROXYZINE HYDROCHLORIDE 25 MG/1
50 TABLET, FILM COATED ORAL EVERY 6 HOURS PRN
Status: DISCONTINUED | OUTPATIENT
Start: 2025-01-01 | End: 2025-01-02 | Stop reason: HOSPADM

## 2025-01-01 RX ORDER — LISINOPRIL 40 MG/1
40 TABLET ORAL DAILY
Status: DISCONTINUED | OUTPATIENT
Start: 2025-01-01 | End: 2025-01-02 | Stop reason: HOSPADM

## 2025-01-01 RX ORDER — PROCHLORPERAZINE MALEATE 5 MG/1
5 TABLET ORAL EVERY 6 HOURS PRN
Status: DISCONTINUED | OUTPATIENT
Start: 2025-01-01 | End: 2025-01-01

## 2025-01-01 RX ORDER — ONDANSETRON 4 MG/1
4 TABLET, ORALLY DISINTEGRATING ORAL EVERY 6 HOURS PRN
Status: DISCONTINUED | OUTPATIENT
Start: 2025-01-01 | End: 2025-01-01

## 2025-01-01 RX ORDER — ASPIRIN 81 MG/1
81 TABLET ORAL 2 TIMES DAILY
Qty: 84 TABLET | Refills: 0 | Status: SHIPPED | OUTPATIENT
Start: 2025-01-01

## 2025-01-01 RX ORDER — HYDROXYZINE HYDROCHLORIDE 25 MG/1
25 TABLET, FILM COATED ORAL EVERY 6 HOURS PRN
Status: DISCONTINUED | OUTPATIENT
Start: 2025-01-01 | End: 2025-01-02 | Stop reason: HOSPADM

## 2025-01-01 RX ORDER — HYDROMORPHONE HCL IN WATER/PF 6 MG/30 ML
0.2 PATIENT CONTROLLED ANALGESIA SYRINGE INTRAVENOUS EVERY 4 HOURS PRN
Status: DISCONTINUED | OUTPATIENT
Start: 2025-01-01 | End: 2025-01-02 | Stop reason: HOSPADM

## 2025-01-01 RX ORDER — IBUPROFEN 400 MG/1
400 TABLET, FILM COATED ORAL ONCE
Status: COMPLETED | OUTPATIENT
Start: 2025-01-01 | End: 2025-01-01

## 2025-01-01 RX ORDER — HYDROMORPHONE HCL IN WATER/PF 6 MG/30 ML
0.1 PATIENT CONTROLLED ANALGESIA SYRINGE INTRAVENOUS EVERY 4 HOURS PRN
Status: DISCONTINUED | OUTPATIENT
Start: 2025-01-01 | End: 2025-01-02 | Stop reason: HOSPADM

## 2025-01-01 RX ORDER — HYDROCODONE BITARTRATE AND ACETAMINOPHEN 5; 325 MG/1; MG/1
1 TABLET ORAL EVERY 6 HOURS PRN
Status: DISCONTINUED | OUTPATIENT
Start: 2025-01-01 | End: 2025-01-01

## 2025-01-01 RX ORDER — POLYETHYLENE GLYCOL 3350 17 G/17G
17 POWDER, FOR SOLUTION ORAL DAILY
Status: DISCONTINUED | OUTPATIENT
Start: 2025-01-02 | End: 2025-01-02 | Stop reason: HOSPADM

## 2025-01-01 RX ORDER — AMOXICILLIN 250 MG
1 CAPSULE ORAL 2 TIMES DAILY
Status: DISCONTINUED | OUTPATIENT
Start: 2025-01-01 | End: 2025-01-01

## 2025-01-01 RX ADMIN — SENNOSIDES AND DOCUSATE SODIUM 1 TABLET: 50; 8.6 TABLET ORAL at 09:19

## 2025-01-01 RX ADMIN — ACETAMINOPHEN 975 MG: 325 TABLET ORAL at 05:33

## 2025-01-01 RX ADMIN — CEFAZOLIN 1 G: 1 INJECTION, POWDER, FOR SOLUTION INTRAMUSCULAR; INTRAVENOUS at 05:33

## 2025-01-01 RX ADMIN — HYDROCODONE BITARTRATE AND ACETAMINOPHEN 2 TABLET: 5; 325 TABLET ORAL at 15:45

## 2025-01-01 RX ADMIN — AMLODIPINE BESYLATE 10 MG: 10 TABLET ORAL at 13:35

## 2025-01-01 RX ADMIN — HYDROXYZINE HYDROCHLORIDE 50 MG: 25 TABLET ORAL at 16:49

## 2025-01-01 RX ADMIN — ACETAMINOPHEN 975 MG: 325 TABLET ORAL at 14:00

## 2025-01-01 RX ADMIN — ASPIRIN 81 MG: 81 TABLET, COATED ORAL at 09:19

## 2025-01-01 RX ADMIN — ASPIRIN 81 MG: 81 TABLET, COATED ORAL at 22:51

## 2025-01-01 RX ADMIN — HYDROCODONE BITARTRATE AND ACETAMINOPHEN 2 TABLET: 5; 325 TABLET ORAL at 22:51

## 2025-01-01 RX ADMIN — OXYCODONE 5 MG: 5 TABLET ORAL at 01:39

## 2025-01-01 RX ADMIN — KETOROLAC TROMETHAMINE 15 MG: 15 INJECTION, SOLUTION INTRAMUSCULAR; INTRAVENOUS at 18:05

## 2025-01-01 RX ADMIN — HYDROMORPHONE HYDROCHLORIDE 0.2 MG: 0.2 INJECTION, SOLUTION INTRAMUSCULAR; INTRAVENOUS; SUBCUTANEOUS at 12:12

## 2025-01-01 RX ADMIN — OXYCODONE 5 MG: 5 TABLET ORAL at 05:43

## 2025-01-01 RX ADMIN — POLYETHYLENE GLYCOL 3350 17 G: 17 POWDER, FOR SOLUTION ORAL at 09:20

## 2025-01-01 RX ADMIN — HYDROCHLOROTHIAZIDE 25 MG: 25 TABLET ORAL at 13:35

## 2025-01-01 RX ADMIN — HYDROCODONE BITARTRATE AND ACETAMINOPHEN 1 TABLET: 5; 325 TABLET ORAL at 09:48

## 2025-01-01 RX ADMIN — LISINOPRIL 40 MG: 40 TABLET ORAL at 13:35

## 2025-01-01 RX ADMIN — HYDROXYZINE HYDROCHLORIDE 25 MG: 25 TABLET ORAL at 09:48

## 2025-01-01 RX ADMIN — IBUPROFEN 400 MG: 400 TABLET ORAL at 14:00

## 2025-01-01 ASSESSMENT — ACTIVITIES OF DAILY LIVING (ADL)
ADLS_ACUITY_SCORE: 23
ADLS_ACUITY_SCORE: 31
ADLS_ACUITY_SCORE: 31
ADLS_ACUITY_SCORE: 30
ADLS_ACUITY_SCORE: 31
ADLS_ACUITY_SCORE: 24
ADLS_ACUITY_SCORE: 26
ADLS_ACUITY_SCORE: 24
ADLS_ACUITY_SCORE: 24
ADLS_ACUITY_SCORE: 30
ADLS_ACUITY_SCORE: 28
ADLS_ACUITY_SCORE: 31
ADLS_ACUITY_SCORE: 28
ADLS_ACUITY_SCORE: 28
ADLS_ACUITY_SCORE: 24
ADLS_ACUITY_SCORE: 23
ADLS_ACUITY_SCORE: 31
ADLS_ACUITY_SCORE: 24
ADLS_ACUITY_SCORE: 23
ADLS_ACUITY_SCORE: 31
ADLS_ACUITY_SCORE: 23
ADLS_ACUITY_SCORE: 30
ADLS_ACUITY_SCORE: 28

## 2025-01-01 NOTE — PROGRESS NOTES
"[facility] Orthopaedics Progress Note      Post-operative Day: 1 Day Post-Op    Procedure(s):  LEFT TOTAL KNEE ARTHROPLASTY  Subjective:      Chest pain, SOB:  no  Nausea, vomiting:  no  Lightheadedness, dizziness:  no  Neuro:  Patient denies new onset numbness or paresthesias      Objective:  Blood pressure (!) 160/73, pulse 58, temperature 98  F (36.7  C), temperature source Oral, resp. rate 18, height 1.702 m (5' 7\"), weight 69.9 kg (154 lb), SpO2 99%.    Patient Vitals for the past 24 hrs:   BP Temp Temp src Pulse Resp SpO2 Height Weight   01/01/25 0524 (!) 160/73 98  F (36.7  C) Oral 58 18 99 % -- --   01/01/25 0234 (!) 148/67 97.8  F (36.6  C) Oral 54 18 98 % -- --   12/31/24 2315 (!) 154/78 -- -- -- -- -- -- --   12/31/24 1826 (!) 170/77 97.6  F (36.4  C) Temporal 57 20 98 % -- --   12/31/24 1750 (!) 146/75 -- -- (!) 46 19 100 % -- --   12/31/24 1730 (!) 161/74 -- -- (!) 46 28 100 % -- --   12/31/24 1720 (!) 142/64 -- -- (!) 47 12 100 % -- --   12/31/24 1640 120/63 -- -- 58 25 100 % -- --   12/31/24 1630 120/58 -- -- 62 14 100 % -- --   12/31/24 1610 118/57 96.8  F (36  C) Core 64 13 100 % -- --   12/31/24 1355 137/67 97.3  F (36.3  C) -- 50 28 99 % -- --   12/31/24 1350 (!) 140/67 97.3  F (36.3  C) -- 50 14 99 % -- --   12/31/24 1345 (!) 142/63 97.3  F (36.3  C) -- 52 14 100 % -- --   12/31/24 1340 (!) 159/75 97.2  F (36.2  C) -- 53 28 100 % -- --   12/31/24 1338 -- 97.2  F (36.2  C) -- 53 29 97 % -- --   12/31/24 1337 (!) 163/76 97.2  F (36.2  C) -- 52 23 97 % -- --   12/31/24 1241 (!) 162/77 97.9  F (36.6  C) Temporal 52 -- 98 % 1.702 m (5' 7\") 69.9 kg (154 lb)       Wt Readings from Last 4 Encounters:   12/31/24 69.9 kg (154 lb)   11/01/24 70 kg (154 lb 6.4 oz)   05/09/24 71.2 kg (157 lb)   04/30/24 70.3 kg (155 lb)       Gen: A&O x 3. NAD. Appears resting comfortably in bed  Wound status: clean, dry, and intact bandage  Circulation, motion and sensation: Dorsiflexion/plantarflexion intact and equal " bilaterally; distal bilateral extremity sensation is intact and equal bilaterally. Foot and toes are warm and well perfused.    Swelling: mild  Calf tenderness: calves are soft and non-tender bilaterally     Pertinent Labs   Lab Results: personally reviewed.     Recent Labs   Lab Test 01/01/25  0624 11/01/24  1050 05/09/24  1251 01/18/24  1209   WBC  --  6.3 5.6 5.6   HGB 12.1* 13.0* 13.7 13.9   HCT  --  37.4* 39.6* 39.9*   MCV  --  96 95 93   PLT  --  212 202 211   NA  --  139 140 141       Plan:   Continue current cares and rehabilitation.  Anticoagulation protocol: Aspirin 81 mg BID  x 42  days  Pain medications:  scopainmedication: oxycodone and tylenol  Weight bearing status:  WBAT  Disposition:  Home today             Report completed by:  Latosha Mistry PA-C  Date: 1/1/2025  Time: 7:01 AM

## 2025-01-01 NOTE — PROVIDER NOTIFICATION
PA paged regarding patient having uncontrolled pain with use of all PRN and scheduled pain medications.     Discussed with BISHOP Mistry. 1x ibuprofen added, increased dose of norco to 1-2 tablets, will give when available and see if pain is improved.

## 2025-01-01 NOTE — PLAN OF CARE
Problem: Adult Inpatient Plan of Care  Goal: Optimal Comfort and Wellbeing  Outcome: Progressing   Goal Outcome Evaluation:      Patient vital signs are at baseline: elevated BPS during this shift.   Patient able to ambulate as they were prior to admission or with assist devices provided by therapies during their stay:  YesAx1 w/gb  Patient MUST void prior to discharge:  Yes  Patient able to tolerate oral intake:  Yes  Pain has adequate pain control using Oral analgesics:  Yes  Does patient have an identified :  Yes  Has goal D/C date and time been discussed with patient:  Yes, possible early discharge.     Pt is alert and oriented x4, but confused at times. Baseline bradycardiac. Elevated BPS during this shift. Ax1 walker and gait belt. Denied any HA, SOB, chest pain, or N/V.

## 2025-01-01 NOTE — PROGRESS NOTES
"   01/01/25 1000   Appointment Info   Signing Clinician's Name / Credentials (PT) Jyoti Carmona, PT, DPT   Living Environment   People in Home spouse   Current Living Arrangements house   Home Accessibility stairs to enter home   Number of Stairs, Main Entrance 3   Stair Railings, Main Entrance railings safe and in good condition   Transportation Anticipated family or friend will provide   Living Environment Comments Spouse is \"disabled\" per patient. Grandson will be able to assist for 3 days post-op.   Self-Care   Usual Activity Tolerance good   Current Activity Tolerance fair   Equipment Currently Used at Home none   Fall history within last six months no   Activity/Exercise/Self-Care Comment Patient owns a FWW.   General Information   Onset of Illness/Injury or Date of Surgery 12/31/24   Referring Physician Danyel Em MD   Patient/Family Therapy Goals Statement (PT) Return home   Pertinent History of Current Problem (include personal factors and/or comorbidities that impact the POC) s/p L TKA   Existing Precautions/Restrictions fall   Weight-Bearing Status - LLE weight-bearing as tolerated   Weight-Bearing Status - RLE full weight-bearing   Range of Motion (ROM)   Range of Motion ROM deficits secondary to surgical procedure   Strength (Manual Muscle Testing)   Strength (Manual Muscle Testing) Deficits observed during functional mobility   Bed Mobility   Bed Mobility no deficits identified   Transfers   Transfers sit-stand transfer   Impairments Contributing to Impaired Transfers pain;decreased ROM;decreased strength   Sit-Stand Transfer   Sit-Stand [place] (Transfers) contact guard;verbal cues   Assistive Device (Sit-Stand Transfers) walker, front-wheeled   Gait/Stairs (Locomotion)   [place] Level (Gait) contact guard;verbal cues   Assistive Device (Gait) walker, front-wheeled   Distance in Feet (Gait) 10'   Pattern (Gait) step-to   Deviations/Abnormal Patterns (Gait) antalgic;gait speed " decreased;stride length decreased;weight shifting decreased   Comment, (Gait/Stairs) Stairs not tested due to patient feeling dizzy.   Clinical Impression   Criteria for Skilled Therapeutic Intervention Yes, treatment indicated   PT Diagnosis (PT) Impaired functional mobility   Influenced by the following impairments Pain, decreased ROM, decreased strength   Functional limitations due to impairments Transfers, gait, stairs   Clinical Presentation (PT Evaluation Complexity) stable   Clinical Presentation Rationale Patient presents as medically diagnosed.   Clinical Decision Making (Complexity) low complexity   Planned Therapy Interventions (PT) gait training;home exercise program;patient/family education;ROM (range of motion);stair training;strengthening;transfer training;home program guidelines   Risk & Benefits of therapy have been explained evaluation/treatment results reviewed;care plan/treatment goals reviewed;patient   PT Total Evaluation Time   PT Eval, Low Complexity Minutes (25743) 10   Physical Therapy Goals   PT Frequency Daily   PT Predicted Duration/Target Date for Goal Attainment 01/08/25   PT Goals Transfers;Gait;Stairs;PT Goal 1   PT: Transfers Supervision/stand-by assist;Sit to/from stand;Assistive device  (FWW)   PT: Gait Supervision/stand-by assist;Assistive device;150 feet  (FWW)   PT: Stairs Supervision/stand-by assist;3 stairs;Rail on left   PT: Goal 1 Patient will be independent with TKA HEP by time of discharge.   Interventions   Interventions Quick Adds Gait Training;Therapeutic Procedure   Therapeutic Procedure/Exercise   Ther. Procedure: strength, endurance, ROM, flexibillity Minutes (23869) 15   Symptoms Noted During/After Treatment increased pain   Treatment Detail/Skilled Intervention Patient completed TKA HEP x 10 reps each with verbal cues for exercise technique.   Gait Training   Gait Training Minutes (47108) 10   Symptoms Noted During/After Treatment (Gait Training) increased pain    Treatment Detail/Skilled Intervention Patient ambulated in hallway, to/from bathroom, with FWW. Patient ambulated with antalgic, step-to gait pattern. Unable to progress due to increased pain and dizziness. CGA for toilet transfer in bathroom.   Distance in Feet 50' x 2   [place] Level (Gait Training) contact guard   Physical Assistance Level (Gait Training) 1 person assist   Weight Bearing (Gait Training) weight-bearing as tolerated   Assistive Device (Gait Training) rolling walker  (FWW)   PT Discharge Planning   PT Plan Stairs, progress gait with FWW, review TKA HEP   PT Discharge Recommendation (DC Rec) other (see comments)  (Defer to ortho)   PT Rationale for DC Rec Patient is only able to ambulate up to 50' with CGA and FWW. Stairs not tested due to patient feeling dizzy.   PT Brief overview of current status See above.   PT Equipment Needed at Discharge walker, rolling  (FWW)   Physical Therapy Time and Intention   Timed Code Treatment Minutes 25   Total Session Time (sum of timed and untimed services) 35

## 2025-01-01 NOTE — PROVIDER NOTIFICATION
Patient is requesting pain medication. All pain medication discontinued. Paged provider in regards to increased pain with no PRN pain meds. New orders obtained. Updated provider that patient is having increased dizziness when working with therapy.

## 2025-01-01 NOTE — PROGRESS NOTES
Occupational Therapy Discharge Summary    Reason for therapy discharge:    All goals and outcomes met, no further needs identified.    Progress towards therapy goal(s). See goals on Care Plan in [facility] electronic health record for goal details.  Goals met    Therapy recommendation(s):    No further therapy is recommended.

## 2025-01-01 NOTE — PLAN OF CARE
Goal Outcome Evaluation:    Patient vital signs are at baseline: Yes  Patient able to ambulate as they were prior to admission or with assist devices provided by therapies during their stay:  No,  Reason:  not OOB this shift due to knee pain. Trying 1x toradol for pain and inflammation   Patient MUST void prior to discharge:  Yes  Patient able to tolerate oral intake:  Yes  Pain has adequate pain control using Oral analgesics:  No,  Reason:  IV dilaudid utilized  Does patient have an identified :  Yes  Has goal D/C date and time been discussed with patient:  Yes    Pain management plan reviewed with PA. On call paged for increased swelling at knee. 1x toradol orders obtained. Home tomorrow pending pain management.

## 2025-01-01 NOTE — PROVIDER NOTIFICATION
Pt had stated that he had BP meds that are taken during the days. Paged Dr. Altamirano about pt's request.

## 2025-01-01 NOTE — PROGRESS NOTES
01/01/25 1030   Appointment Info   Signing Clinician's Name / Credentials (OT) Sotero Skinner OTR/L   Quick Adds   Quick Adds Certification   Living Environment   People in Home spouse   Current Living Arrangements house   Transportation Anticipated family or friend will provide   Living Environment Comments Pt's spouse unable to assist due to disability. Pt will have son and grandson stay for 3 days. 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   Existing Precautions/Restrictions no known precautions/restrictions   Left Lower Extremity (Weight-bearing Status) weight-bearing as tolerated (WBAT)   Cognitive Status Examination   Orientation Status orientation to person, place and time   Affect/Mental Status (Cognitive) WNL   Visual Perception   Visual Impairment/Limitations WFL   Sensory   Sensory Quick Adds sensation intact   Pain Assessment   Patient Currently in Pain Yes, see Vital Sign flowsheet   Posture   Posture not impaired   Range of Motion Comprehensive   General Range of Motion no range of motion deficits identified   Strength Comprehensive (MMT)   General Manual Muscle Testing (MMT) Assessment no strength deficits identified   Bed Mobility   Bed Mobility supine-sit;sit-supine   Comment (Bed Mobility) SBA-CGA   Transfers   Transfers bed-chair transfer;sit-stand transfer;toilet transfer   Transfer Comments SBA-CGA   Activities of  Daily Living   BADL Assessment/Intervention lower body dressing;bathing   Bathing Assessment/Intervention   [place] Level (Bathing) minimum assist (75% patient effort)   Lower Body Dressing Assessment/Training   [place] Level (Lower Body Dressing) minimum assist (75% patient effort)   Clinical Impression   Criteria for Skilled Therapeutic Interventions Met (OT) Yes, treatment indicated   OT Diagnosis decreased ADLs   Influenced by the following impairments TKA   OT Problem List-Impairments impacting ADL activity tolerance impaired;pain;mobility   Assessment of Occupational Performance 1-3 Performance Deficits   Identified Performance Deficits LE dressing/bathing, bed mobility, all transfers/mobility   Planned Therapy Interventions (OT) ADL retraining;bed mobility training;transfer training   Clinical Decision Making Complexity (OT) problem focused assessment/low complexity   Risk & Benefits of therapy have been explained evaluation/treatment results reviewed;patient   OT Total Evaluation Time   OT Eval, Low Complexity Minutes (95058) 10   OT Goals   Therapy Frequency (OT) One time eval and treatment   OT Predicted Duration/Target Date for Goal Attainment 01/01/25   OT Goals Lower Body Dressing;Bed Mobility   OT: Lower Body Dressing Modified independent;using adaptive equipment;Goal Met   OT: Bed Mobility Modified independent;supine to/from sitting;Goal Met   Interventions   Interventions Quick Adds Self-Care/Home Management   Self-Care/Home Management   Self-Care/Home Mgmt/ADL, Compensatory, Meal Prep Minutes (19685) 18   Symptoms Noted During/After Treatment (Meal Preparation/Planning Training) increased pain;other (see comments)  (nausea)   Treatment Detail/Skilled Intervention Pt instructed on bed mobility techniques - completed sit<>supine Mod I. Pt edu on compensatory strategies for LE dressing using reacher and sock aid - pt completed Mod I w/ AE. Pt delining AE for home but names/resources wirtten  down for pt to order if he changes his mind. STS x2 w/ SBA and FWW. Pt only taking a couple steps in place to pull up pants before becoming too painful/nauseous and needing to lay back down. Discussed bathing at home and pt plans to sponge bathe due to shower being upstairs. Pt edu on sleeping position and car transfers - pt verbalized understanding. Emesis bag given to pt. Pt not amb. due to pain/nausea - RN notified.   OT Discharge Planning   OT Plan DC OT   OT Discharge Recommendation (DC Rec)   (defer to ortho)   OT Rationale for DC Rec Pt not tolerating ambulation due to pain/nausea but is completing ADLs Mod I. Pt has good home setup and only has help from son/grandson for 3 days.   OT Brief overview of current status Mod I w/ dressing/bed mobility   OT Equipment Needed at Discharge dressing equipment;reacher  (sock aid)   Total Session Time   Timed Code Treatment Minutes 18   Total Session Time (sum of timed and untimed services) 28

## 2025-01-01 NOTE — PROGRESS NOTES
Pt settled into [facility] room 19 - Hypertensive otherwise VSS on RA. Denies CP or SOB. Pain minimal. CMS intact. Due to void. Tolerating po intake. Call light in reach. Able to make needs known. Family at bedside.    Teresa Lopez RN

## 2025-01-01 NOTE — PROGRESS NOTES
Patient having increased nausea after working with OT. Declined nausea medication at this time. Patient provided with ice packs for pain.

## 2025-01-02 ENCOUNTER — APPOINTMENT (OUTPATIENT)
Dept: PHYSICAL THERAPY | Facility: CLINIC | Age: 80
End: 2025-01-02
Attending: ORTHOPAEDIC SURGERY
Payer: MEDICARE

## 2025-01-02 VITALS
RESPIRATION RATE: 16 BRPM | HEART RATE: 67 BPM | HEIGHT: 67 IN | OXYGEN SATURATION: 97 % | DIASTOLIC BLOOD PRESSURE: 73 MMHG | SYSTOLIC BLOOD PRESSURE: 157 MMHG | WEIGHT: 154 LBS | BODY MASS INDEX: 24.17 KG/M2 | TEMPERATURE: 98.3 F

## 2025-01-02 LAB
FASTING STATUS PATIENT QL REPORTED: YES
GLUCOSE SERPL-MCNC: 102 MG/DL (ref 70–99)
HGB BLD-MCNC: 11.8 G/DL (ref 13.3–17.7)

## 2025-01-02 PROCEDURE — 36415 COLL VENOUS BLD VENIPUNCTURE: CPT | Performed by: ORTHOPAEDIC SURGERY

## 2025-01-02 PROCEDURE — 97110 THERAPEUTIC EXERCISES: CPT | Mod: GP

## 2025-01-02 PROCEDURE — 97116 GAIT TRAINING THERAPY: CPT | Mod: GP

## 2025-01-02 PROCEDURE — 250N000013 HC RX MED GY IP 250 OP 250 PS 637

## 2025-01-02 PROCEDURE — 250N000013 HC RX MED GY IP 250 OP 250 PS 637: Performed by: NURSE PRACTITIONER

## 2025-01-02 PROCEDURE — 85018 HEMOGLOBIN: CPT | Performed by: ORTHOPAEDIC SURGERY

## 2025-01-02 PROCEDURE — 97530 THERAPEUTIC ACTIVITIES: CPT | Mod: GP

## 2025-01-02 PROCEDURE — 250N000013 HC RX MED GY IP 250 OP 250 PS 637: Performed by: ORTHOPAEDIC SURGERY

## 2025-01-02 PROCEDURE — 82947 ASSAY GLUCOSE BLOOD QUANT: CPT | Performed by: ORTHOPAEDIC SURGERY

## 2025-01-02 RX ORDER — HYDROCODONE BITARTRATE AND ACETAMINOPHEN 5; 325 MG/1; MG/1
1-2 TABLET ORAL EVERY 6 HOURS PRN
Status: DISCONTINUED | OUTPATIENT
Start: 2025-01-02 | End: 2025-01-02 | Stop reason: HOSPADM

## 2025-01-02 RX ORDER — AMOXICILLIN 250 MG
1 CAPSULE ORAL 2 TIMES DAILY PRN
Status: SHIPPED
Start: 2025-01-02

## 2025-01-02 RX ORDER — HYDROXYZINE HYDROCHLORIDE 25 MG/1
25 TABLET, FILM COATED ORAL EVERY 6 HOURS PRN
Qty: 25 TABLET | Refills: 0 | Status: SHIPPED | OUTPATIENT
Start: 2025-01-02

## 2025-01-02 RX ADMIN — SENNOSIDES AND DOCUSATE SODIUM 1 TABLET: 50; 8.6 TABLET ORAL at 08:29

## 2025-01-02 RX ADMIN — HYDROCHLOROTHIAZIDE 25 MG: 25 TABLET ORAL at 08:29

## 2025-01-02 RX ADMIN — HYDROCODONE BITARTRATE AND ACETAMINOPHEN 2 TABLET: 5; 325 TABLET ORAL at 05:20

## 2025-01-02 RX ADMIN — LISINOPRIL 40 MG: 40 TABLET ORAL at 08:28

## 2025-01-02 RX ADMIN — HYDROXYZINE HYDROCHLORIDE 25 MG: 25 TABLET ORAL at 08:28

## 2025-01-02 RX ADMIN — AMLODIPINE BESYLATE 10 MG: 10 TABLET ORAL at 08:28

## 2025-01-02 RX ADMIN — POLYETHYLENE GLYCOL 3350 17 G: 17 POWDER, FOR SOLUTION ORAL at 08:29

## 2025-01-02 RX ADMIN — ASPIRIN 81 MG: 81 TABLET, COATED ORAL at 08:29

## 2025-01-02 RX ADMIN — HYDROCODONE BITARTRATE AND ACETAMINOPHEN 2 TABLET: 5; 325 TABLET ORAL at 11:53

## 2025-01-02 ASSESSMENT — ACTIVITIES OF DAILY LIVING (ADL)
ADLS_ACUITY_SCORE: 31
ADLS_ACUITY_SCORE: 35
ADLS_ACUITY_SCORE: 31
ADLS_ACUITY_SCORE: 35
ADLS_ACUITY_SCORE: 35

## 2025-01-02 NOTE — PROGRESS NOTES
Physical Therapy Discharge Summary    Reason for therapy discharge:    All goals and outcomes met, no further needs identified.    Progress towards therapy goal(s). See goals on Care Plan in [facility] electronic health record for goal details.  Goals met    Therapy recommendation(s):    Continued therapy is recommended.  Rationale/Recommendations:  Home PT scheduled to start on Monday for progression of functional mobility, ROM, and strength s/p L TKA.

## 2025-01-02 NOTE — PLAN OF CARE
"Goal Outcome Evaluation:    Patient vital signs are at baseline: elevated BPS during this shift. Interventions implemented were pain control and monitoring. Interventions were effective.  Patient able to ambulate as they were prior to admission or with assist devices provided by therapies during their stay:  Ax1 gb/w  Patient MUST void prior to discharge:  Yes  Patient able to tolerate oral intake:  Yes  Pain has adequate pain control using Oral analgesics:  Yes  Pt had received education on Pain medication and requires further education. Pt had stated that he was 0/10 and wanted \"medication to prevent pain\". Then pt stated \"I am at a 3-7/10  pain medication. I want two tablets\".   Does patient have an identified :  Yes  Has goal D/C date and time been discussed with patient: discharge plans base on Pain control and PT clearance.      Pt is Ax4, but forgetful at times.     Scheduled Tylenol was time for 2200 hours, pt was awake to received medication, but [facility] had stated that it was past it's daily allotment. Request to was sent to pharm to readjust. It was schedule for 0000 hours. Again Epic had stated that it was past it daily accumulative dose, sent a request to again retime the medication. Pt decline schedule tylenol at the time that medication was to administer. Pt stated that he wanted that medication at 8 AM. Sent a request from tylenol to be adjusted to 8 AM.                       "

## 2025-01-02 NOTE — PROGRESS NOTES
"[facility] Orthopaedics Progress Note      Post-operative Day: 2 Days Post-Op    Procedure(s):  LEFT TOTAL KNEE ARTHROPLASTY      Subjective: Patient seen resting in bed, reports pain improved this AM and has been tolerable on PO analgesics. Tolerating a regular diet with no nausea or vomiting. Voiding without difficulty and passing gas.     Pain: moderate  Chest pain, SOB:  No      Objective:  Blood pressure (!) 157/73, pulse 67, temperature 98.3  F (36.8  C), temperature source Oral, resp. rate 16, height 1.702 m (5' 7\"), weight 69.9 kg (154 lb), SpO2 97%.    Patient Vitals for the past 24 hrs:   BP Temp Temp src Pulse Resp SpO2   01/02/25 0735 (!) 157/73 98.3  F (36.8  C) Oral 67 16 97 %   01/02/25 0056 (!) 169/81 -- -- 62 -- --   01/02/25 0043 (!) 182/88 98  F (36.7  C) Oral 61 18 95 %   01/01/25 2303 (!) 181/83 98.3  F (36.8  C) Oral 60 16 95 %   01/01/25 1528 (!) 194/92 98  F (36.7  C) Oral 70 16 96 %   01/01/25 1335 (!) 169/82 -- -- -- -- --   01/01/25 1212 -- -- -- 68 -- 97 %   01/01/25 0948 (!) 164/78 -- -- 62 -- --       Wt Readings from Last 4 Encounters:   12/31/24 69.9 kg (154 lb)   11/01/24 70 kg (154 lb 6.4 oz)   05/09/24 71.2 kg (157 lb)   04/30/24 70.3 kg (155 lb)       General: Alert and orientated, NAD  Respiratory: Non-labored breathing on RA  LLE motor function, sensation, and circulation intact   Yes, Dorsiflexion/plantarflexion intact and equal bilaterally. Moves all other extremities with ease and purpose   Wound status: incisions are clean dry and intact. Yes  Calf tenderness: Bilateral  No    Pertinent Labs   Lab Results: personally reviewed.     Recent Labs   Lab Test 01/02/25  0552 01/01/25  0624 11/01/24  1050 05/09/24  1251 01/18/24  1209   WBC  --   --  6.3 5.6 5.6   HGB 11.8* 12.1* 13.0* 13.7 13.9   HCT  --   --  37.4* 39.6* 39.9*   MCV  --   --  96 95 93   PLT  --   --  212 202 211   NA  --   --  139 140 141       Plan:   Anticoagulation protocol: Aspirin 81 mg BID  x 42  days  Pain " medications:  scopainmedication: norco and vistaril  Weight bearing status:  WBAT  Disposition:  Home today pending therapies    Continue cares and rehabilitation     Report completed by:  IGLESIA Wasserman CNP  Date: 1/2/2025  Time: 8:37 AM

## 2025-01-02 NOTE — PLAN OF CARE
Problem: Adult Inpatient Plan of Care  Goal: Optimal Comfort and Wellbeing  Outcome: Met     Patient vital signs are at baseline: Yes  Patient able to ambulate as they were prior to admission or with assist devices provided by therapies during their stay:  Yes  Patient MUST void prior to discharge:  Yes  Patient able to tolerate oral intake:  Yes  Pain has adequate pain control using Oral analgesics:  Yes  Does patient have an identified :  Yes  Has goal D/C date and time been discussed with patient:  Yes    Patient ready for discharge. Completed discharge paperwork with patient and family. Patient and family stated understanding and questions were answered. All belongings were sent with the patient. Discharged safely.  Fani Kowalski RN

## 2025-01-02 NOTE — DISCHARGE SUMMARY
[facility] Orthopedics Discharge Summary                                  [facility]     [place] HURD [id number]   Age: 79 year old  PCP: Vipul Palomo, [phone] 1945     Date of Admission:  12/31/2024  Date of Discharge::  1/2/2025  Discharge Provider:  IGLESIA Wasserman CNP    Code status:  Full Code    Admission Information:  Admission Diagnosis:  Osteoarthritis of left knee [M17.12]    Post-Operative Day: 2 Days Post-Op     Reason for admission:  The patient was admitted for the following:Procedure(s) (LRB):  LEFT TOTAL KNEE ARTHROPLASTY (Left)    Active Problems:    Left knee DJD    OA (osteoarthritis)      Allergies:  Iodinated contrast media and Dilantin [phenytoin]    Following the procedure noted above the patient was transferred to the post-op floor and started on:    Therapy:  physical therapy and occupational therapy  Anticoagulation Plan: Aspirin 81 mg BID  for 42 days  Pain Management: scopainmedication: norco and vistaril  Weight bearing status: Weight bearing as tolerated     The patient was followed by Orthopedics during the inpatient treatment course:  Complications:  None  Additional consultations:  None     Pertinent Labs   Lab Results: personally reviewed.     Recent Labs   Lab Test 01/02/25  0552 01/01/25  0624 11/01/24  1050 05/09/24  1251 01/18/24  1209   WBC  --   --  6.3 5.6 5.6   HGB 11.8* 12.1* 13.0* 13.7 13.9   HCT  --   --  37.4* 39.6* 39.9*   MCV  --   --  96 95 93   PLT  --   --  212 202 211   NA  --   --  139 140 141          Discharge Information:  Condition at discharge: Stable  Discharge destination:  Discharged to home     Medications at discharge:  Current Discharge Medication List        START taking these medications    Details   aspirin 81 MG EC tablet Take 1 tablet (81 mg) by mouth 2 times daily.  Qty: 84 tablet, Refills: 0    Associated Diagnoses: Primary osteoarthritis of left knee      hydrOXYzine HCl (ATARAX) 25 MG tablet Take 1  tablet (25 mg) by mouth every 6 hours as needed for other (adjuvant pain).  Qty: 25 tablet, Refills: 0    Associated Diagnoses: Primary osteoarthritis of left knee      senna-docusate (SENOKOT-S/PERICOLACE) 8.6-50 MG tablet Take 1 tablet by mouth 2 times daily as needed for constipation.    Associated Diagnoses: Primary osteoarthritis of left knee           CONTINUE these medications which have NOT CHANGED    Details   amLODIPine (NORVASC) 10 MG tablet Take 1 tablet (10 mg) by mouth daily.  Qty: 90 tablet, Refills: 3    Associated Diagnoses: ADPKD (autosomal dominant polycystic kidney disease)      hydrochlorothiazide (HYDRODIURIL) 25 MG tablet Take 1 tablet (25 mg) by mouth daily.  Qty: 90 tablet, Refills: 3    Associated Diagnoses: Hypertension, renal; ADPKD (autosomal dominant polycystic kidney disease)      lisinopril (ZESTRIL) 40 MG tablet Take 1 tablet (40 mg) by mouth daily.  Qty: 90 tablet, Refills: 3    Associated Diagnoses: Hypertension, renal; ADPKD (autosomal dominant polycystic kidney disease)      sildenafil (REVATIO) 20 MG tablet   See Instructions, Instructions: TAKE THREE TO FIVE TABLETS BY MOUTH 30 MINUTES PRIOR TO SEXUAL ACTIVITY, # 20 tab(s), 0 Refill(s), Pharmacy: [facility] #[id number], TAKE THREE TO FIVE TABLETS BY MOUTH 30 MINUTES PRIOR TO SEXUAL ACTIVITY, 69, in, 10/25...                        Follow-Up Care:  Patient should be seen in the office in 14 days by the Orthopedic Surgeon/Physician Assistant.  Call [phone] for appointment or questions.    It was my pleasure to take care of the above patient.  IGLESIA Wasserman CNP

## 2025-01-02 NOTE — PLAN OF CARE
Problem: Knee Arthroplasty  Goal: Effective Bowel Elimination  Outcome: Progressing  Intervention: Enhance Bowel Motility and Elimination  Recent Flowsheet Documentation  Taken 1/2/2025 0838 by Zamzam Blanco RN  Bowel Elimination Management:   hygiene measures promoted   sitting position facilitated   toileting offered   relaxation techniques promoted     Problem: Knee Arthroplasty  Goal: Optimal Coping  Outcome: Met  Intervention: Support Psychosocial Response to Surgery and Mobility Changes  Recent Flowsheet Documentation  Taken 1/2/2025 0838 by Zamzam Blanco RN  Supportive Measures:   active listening utilized   counseling provided   decision-making supported   goal-setting facilitated   relaxation techniques promoted   self-care encouraged   self-reflection promoted   self-responsibility promoted   verbalization of feelings encouraged   positive reinforcement provided   problem-solving facilitated  Goal: Absence of Bleeding  Outcome: Met  Goal: Fluid and Electrolyte Balance  Outcome: Met  Goal: Optimal Functional Ability  Outcome: Met  Intervention: Promote Optimal Functional Status  Recent Flowsheet Documentation  Taken 1/2/2025 0838 by Zamzam Blanco RN  Self-Care Promotion: independence encouraged  Activity Management:   activity adjusted per tolerance   dorsiflexion/plantar flexion performed  Goal: Absence of Infection Signs and Symptoms  Outcome: Met  Intervention: Prevent or Manage Infection  Recent Flowsheet Documentation  Taken 1/2/2025 0838 by Zamzam Blanco RN  Fever Reduction/Comfort Measures: ice pack(s) applied  Goal: Intact Neurovascular Status  Outcome: Met  Intervention: Prevent or Manage Neurovascular Compromise  Recent Flowsheet Documentation  Taken 1/2/2025 0838 by Zamzam Blanco RN  Compartment Syndrome Management: active flexion/extension encouraged  Goal: Anesthesia/Sedation Recovery  Outcome: Met  Intervention: Optimize Anesthesia Recovery  Recent Flowsheet Documentation  Taken 1/2/2025  1300 by Zamzam Blanco RN  Safety Promotion/Fall Prevention: safety round/check completed  Taken 1/2/2025 1200 by Zamzam Blanco RN  Safety Promotion/Fall Prevention: safety round/check completed  Taken 1/2/2025 0838 by Zamzam Blanco RN  Safety Promotion/Fall Prevention: safety round/check completed  Administration (IS): instruction provided, initial  Level Incentive Spirometer (mL): 1500  Number of Repetitions (IS): 10  Patient Tolerance (IS): good  Taken 1/2/2025 0700 by Zamzam Blanco RN  Safety Promotion/Fall Prevention: safety round/check completed  Goal: Optimal Pain Control and Function  Outcome: Met  Intervention: Prevent or Manage Pain  Recent Flowsheet Documentation  Taken 1/2/2025 1153 by Zamzam Blanco RN  Pain Management Interventions:   medication (see MAR)   premedicated for activity  Taken 1/2/2025 0828 by Zamzam Blanco RN  Pain Management Interventions:   medication (see MAR)   cold applied   distraction   emotional support  Goal: Nausea and Vomiting Relief  Outcome: Met  Goal: Effective Urinary Elimination  Outcome: Met  Goal: Effective Oxygenation and Ventilation  Outcome: Met  Intervention: Optimize Oxygenation and Ventilation  Recent Flowsheet Documentation  Taken 1/2/2025 0838 by Zamzam Blanco RN  Activity Management:   activity adjusted per tolerance   dorsiflexion/plantar flexion performed  Head of Bed (HOB) Positioning: HOB at 60 degrees   Goal Outcome Evaluation:  Pt ambulates with walker, SBA with gait belt. Gait is steady.     Pain controlled from 7/10 to 3/10 with po pain medication, ice, rest, repositioning. Pt taught back pain education to RN during shift.     Wife and son at bedside prior to discharge.     Printed prescriptions with patient.

## 2025-03-13 ENCOUNTER — PATIENT OUTREACH (OUTPATIENT)
Dept: NEPHROLOGY | Facility: CLINIC | Age: 80
End: 2025-03-13
Payer: COMMERCIAL

## 2025-04-24 DIAGNOSIS — Q61.2 ADPKD (AUTOSOMAL DOMINANT POLYCYSTIC KIDNEY DISEASE): Primary | ICD-10-CM

## 2025-05-02 ENCOUNTER — OFFICE VISIT (OUTPATIENT)
Dept: NEPHROLOGY | Facility: CLINIC | Age: 80
End: 2025-05-02
Attending: INTERNAL MEDICINE
Payer: MEDICARE

## 2025-05-02 VITALS
SYSTOLIC BLOOD PRESSURE: 146 MMHG | DIASTOLIC BLOOD PRESSURE: 80 MMHG | BODY MASS INDEX: 23.49 KG/M2 | TEMPERATURE: 97.5 F | WEIGHT: 150 LBS | OXYGEN SATURATION: 97 % | HEART RATE: 53 BPM

## 2025-05-02 DIAGNOSIS — N18.31 STAGE 3A CHRONIC KIDNEY DISEASE (H): ICD-10-CM

## 2025-05-02 DIAGNOSIS — I15.1 HYPERTENSION SECONDARY TO OTHER RENAL DISORDERS: ICD-10-CM

## 2025-05-02 DIAGNOSIS — R61 NIGHT SWEATS: Primary | ICD-10-CM

## 2025-05-02 DIAGNOSIS — Q61.2 ADPKD (AUTOSOMAL DOMINANT POLYCYSTIC KIDNEY DISEASE): ICD-10-CM

## 2025-05-02 DIAGNOSIS — I67.1 CEREBRAL ANEURYSM: ICD-10-CM

## 2025-05-02 PROCEDURE — 99000 SPECIMEN HANDLING OFFICE-LAB: CPT | Performed by: PATHOLOGY

## 2025-05-02 PROCEDURE — 82043 UR ALBUMIN QUANTITATIVE: CPT | Performed by: INTERNAL MEDICINE

## 2025-05-02 PROCEDURE — G0463 HOSPITAL OUTPT CLINIC VISIT: HCPCS | Performed by: INTERNAL MEDICINE

## 2025-05-02 ASSESSMENT — PAIN SCALES - GENERAL: PAINLEVEL_OUTOF10: MILD PAIN (2)

## 2025-05-02 NOTE — NURSING NOTE
Chief Complaint   Patient presents with    RECHECK     RETURN KIDNEY GENETICS         BP Readings from Last 3 Encounters:   05/02/25 (!) 146/80   01/02/25 (!) 157/73   11/01/24 133/76       BP (!) 146/80   Pulse 53   Temp 97.5  F (36.4  C) (Oral)   Wt 68 kg (150 lb)   SpO2 97%   BMI 23.49 kg/m       Ruby Schmitz

## 2025-05-02 NOTE — LETTER
5/2/2025       RE: Tariq Yeager  [address]  [facility] [address]     Dear Colleague,    Thank you for referring your patient, Tariq Yeager, to the [facility]PHR[facility] [place] at [facility]. Please see a copy of my visit note below.    Nephrology Clinic    Tariq Yeager MRN:[id number] YOB: 1945    REASON FOR FOLLOW UP: Polycystic kidney disease    HISTORY OF PRESENT ILLNESS:  Date of initial Service: October 29, 2020  Tariq Yeager is a 73 year old male who presented initially for multiple kidney cysts, consistent with PKD.   Mr Yeager has recently been informed about the presence of bilateral renal cysts which were incidentally identified by MRI of the spine he received in early February 2018 for back pain.  The presence of bilateral renal cysts was confirmed by ultrasound also obtained recently. (2/11/19 [facility])(see below).  He denies any history of gross hematuria, flank pain, urolithiasis, or UTIs.  He does however have a history of acute sudden incerebral hemorrhage in 2002 for which he had a prolonged ICU hospitalization at [facility] .  Note is made that he also underwent placement of a permanent IVC filter, although Mr Yeager does not recall having had a DVT.  Prior to that event, it does not appear that he was in good health.  He had an inguinal herniorrhaphy on the R and reports a recurrence of the hernia + new Inguinal hernia on the L. No history of diverticulosis     June 13, 2019  Since the last visit, he had an MRI of Brain and Abd.  MRI of the brain revealed a small aneurysm for which I put in a referral to Dr STAN Robles.  Aneurysm was deemed very small and recommended repeat MRI in 2 year.  MRI of Abd revealed an estimated total kidney volume of 1627 ml, with class IB risk  category of progression of ADPKD, with an estimated projected eGFR ~ 24 ml/min in 10 years.  He is  "moderating his caffeine intake, salt intake.    Still swims 3 x a week.  Feels generally well.  No HA.     December 19, 2019  Since last visit, had a cologard test that came back positive. He is scheduled to have a colonoscopy in Jan 2020.  Otherwise feels \"very well physically\" \"totally boring\"  Swims 25 min twice a week, without CP or ESCOBEDO.    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  No L Ext swelling.  No gross hematuria or  symptoms.  Rare diarrhea.  No abd pain.  No HA.  Admits to forgetting his meds on occasion (estimates he takes his meds 5 days a week)  Drinks 6 cups of coffee a day.      July 30, 2020  Since last visit, he notes doing well. No hospitalizations or significant illnesses. No problems with hematuria or dysuria.  No abd pain. Continues to swim regularly, 3-4x per week.   He continues to have chronic headaches, almost nightly, for many years. No new symptoms or change in character. He does snore quite loudly per the wife, but no witnessed apnea. Does not take Tylenol or Advil.  In terms of BP, home BP is running 130-140/80-90. Remains on amlodipine and lisinopril, good adherence.  Of note, he did have a fall while fixing some gutters, and did briefly lose consciousness. Feeling fine now without issue. Did not seek medical attention.     October 29, 2020  24 hour BP monitoring  July 30, 2020  Overall Syst  +/- 11.67  Diast BP 74+/-5.8  Pulse pressure 62+/-10.0   HR 58+/-8.9  Syst > limits 52%, Diast > limit 21%  Please see scanned document for full results  Of Note, most of the that day, he was very active doing repairs \"on a ladder\".       Has new BP machine 122-156  Most > 135  Drinks about 8 x 6 oz of coffee, has cut down to 3 cups a day.  Continues to cut down salt, paying attention to sodium content of foods.  Continues to swim 3 x a week, currently doing 14 laps + exersices,  Plus once a week aerobic exercise.    September 30, 2021  Overall feeling well. Blood pressure control sub-optimal with " systolic blood pressure ranging between 130-150. Is trying to cut down on caffeine intake. Down to 4 cups a day. No interim hospitalization or change in meds. Started taking Lisinopril 30 mg about a week ago (before this was taking 20 mg).     ROS;   Occasional mild flank pain.  Leg cramps + (trying Tonic water - has quinine, which he reports helps)  No GI symptoms.  No  symptoms,   Stream may have slowed down.  No L Ext swelling.  No Change in L inguinal hernia.        March 31, 2022  Feels generally well.  To have repeat MRI of brain next week to follow up on small aneurysm seen 2 years ago.  No GI or  symptoms  Had nocturnal pressure CP, recurrent. -> had stress echo in Nov 2021 which was normal  Taking losartan 50 mg (instead of 100)    January 18, 2024  Mr Tavares presents with no acute complaints.  He feels generally well and remains physically active.  He raised questions about his L inguinal hernia which he discussed with his PCP, but did not have evaluated by surgery.  It has caused no acute pain and is easily reducible by his description.  He then reported some RLQ localized pain superior to the inguinal ligament, without a visible or palpable bulge.   He otherwise has no HA, gross hematuria, dysuria or GI complaints.  No SOB or CP.   BP at home remains suboptimally controlled with SBP usually > 140.  No L Ext swelling      May 9, 2024  Had L inguinal hernia repair Feb 29  Has suprapubic discomfort and saw Dr Ware today.  Feels that BP is not as good on Losartan, would like to go back to lisinopril.  At home -163.  He does not want to take hydrochlorothiazide   No CP, SOB, flank pain  Does have pain in right inguinal area as well.  No L Ext swelling.  No gross hematuria.    November 1, 2024  Feels generally well.  He had a L inguinal hernia repaired by Dr Ware on 2/26/24   He also underwent cataract surgery right eye on 6/26/24  He had a virtual visit with Dr Robles, neurosurgery on  4/30/24.  Repeat MRA without significant change from previous. It was determined that he could have the next repeat MRA in 5 years (around 4/2029)  He denies any gross hematuria or flank pain. No L Ext swelling.   No GI or  symptoms.    May 2, 2025  Had L knee replacment.  Still having some swelling and pain.  Undergoing PT  C/o drenching night sweats since surgery.  He has sweats every night, but sometimes has to change his shirt once or twice a night.   Discussed with his PCP.   Reports having had imaging of the chest and abdomen.  No F/chill/cough.    Never smoker  Weight is stable.   Appetite is good.  No Add pain GI or  spms.   No recent travel outside of [place].    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/2019     Eczema      ED (erectile dysfunction)      History of aneurysm     small brain aneurysm being montired by Neurology     Hyperlipidemia      Hypertension 2002     Inguinal hernia 2016     Presence of IVC filter 2002     Thrombosis 2002    DVT when hospitalized     PAST SURGICAL HISTORY:  Past Surgical History:   Procedure Laterality Date     ARTHROPLASTY KNEE Left 12/31/2024    Procedure: LEFT TOTAL KNEE ARTHROPLASTY;  Surgeon: Danyel Em MD;  Location: [facility] OR     CATARACT EXTRACTION Right 06/26/2024     CATARACT EXTRACTION Left 07/24/2024     cerebral aneurysm coil  2002     CRANIOTOMY  2002     HERNIORRHAPHY INGUINAL Right 2016     HERNIORRHAPHY INGUINAL Left 02/26/2024    Procedure: HERNIORRHAPHY, INGUINAL, OPEN LEFT;  Surgeon: Ra Ware MD;  Location: [facility] OR     MEDICATIONS: reviewed with patient on 5/2/25  Current Outpatient Medications   Medication Sig Dispense Refill  "    amLODIPine (NORVASC) 10 MG tablet Take 1 tablet (10 mg) by mouth daily. 90 tablet 3     hydrochlorothiazide (HYDRODIURIL) 25 MG tablet Take 1 tablet (25 mg) by mouth daily. 90 tablet 3     lisinopril (ZESTRIL) 40 MG tablet Take 1 tablet (40 mg) by mouth daily. 90 tablet 3     senna-docusate (SENOKOT-S/PERICOLACE) 8.6-50 MG tablet Take 1 tablet by mouth 2 times daily as needed for constipation.       sildenafil (REVATIO) 20 MG tablet   See Instructions, Instructions: TAKE THREE TO FIVE TABLETS BY MOUTH 30 MINUTES PRIOR TO SEXUAL ACTIVITY, # 20 tab(s), 0 Refill(s), Pharmacy: [facility] #[id number], TAKE THREE TO FIVE TABLETS BY MOUTH 30 MINUTES PRIOR TO SEXUAL ACTIVITY, 69, in, 10/25...     ALLERGIES:    Allergies   Allergen Reactions     Iodinated Contrast Media Rash     Dilantin [Phenytoin] Rash     REVIEW OF SYSTEMS:  A comprehensive review of systems was performed and found to be negative except as described here or above.     SOCIAL HISTORY:   Social History     Socioeconomic History     Marital status:      Spouse name: Not on file     Number of children: Not on file     Years of education: Not on file     Highest education level: Not on file   Occupational History     Not on file   Social Needs     Financial resource strain: Not on file     Food insecurity:     Worry: Not on file     Inability: Not on file     Transportation needs:     Medical: Not on file     Non-medical: Not on file   Tobacco Use     Smoking status: Never Smoker     Smokeless tobacco: Never Used   Substance and Sexual Activity     Alcohol use: No     Frequency: Never.  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  Remote history of use > 40 yrs ago     Sexual activity: Not on file   Lifestyle     Physical activity:     Days per week: Swims 3x/week     Minutes per session: 25 min     Stress: Not on file   Relationships   Other Topics Concern     Not on file   Social History Narrative     Not on file "     FAMILY MEDICAL HISTORY:   Family History   Problem Relation Age of Onset     Kidney failure Mother      Cystic Kidney Disease Sister    Sister (70) was recently diagnosed with PKD by MRI, with multiple bilat cyst  Brother (77) with possible PKD (with few cysts on one side only)  Sister (75) does not want to be evaluated, but used to run marathons, and still runs half-marathons.    PHYSICAL EXAM:   BP (!) 146/80   Pulse 53   Temp 97.5  F (36.4  C) (Oral)   Wt 68 kg (150 lb)   SpO2 97%   BMI 23.49 kg/m     GENERAL APPEARANCE: alert and no distress  EYES: nonicteric  NECK: supple, no adenopathy.   RESP: lungs clear to auscultation   CV: regular rhythm, normal rate, no rub  ABDOMEN: soft,     Extremities: no edema  MS: L knee incision well healed.  Knee is warm with some swelling, but there is no redness or tenderness.  Good ROM without pain.   No overt effusion.  SKIN: no rash  NEURO: mentation intact and speech normal  PSYCH: affect normal/bright     LABS:   CMP  Recent Labs   Lab Test 05/02/25  0927 01/02/25  0552 01/01/25  0624 11/01/24  1050 07/01/24  1538 05/09/24  1251 01/18/24  1209 09/30/21  1340 10/29/20  1407 07/30/20  1328 12/19/19  1416 06/13/19  1354 06/13/19  1354 04/05/19  1249 03/28/19  1456     --   --  139  --  140 141   < > 142 138 141  --  140   < > 142   POTASSIUM 3.9  --   --  3.7  --  4.3 4.3   < > 3.8 3.8 4.0  --  4.1   < > 4.0   CHLORIDE 106  --   --  102  --  108* 107   < > 110* 106 109  --  109   < > 109   CO2 28  --   --  28  --  23 26   < > 27 27 27  --  26   < > 27   ANIONGAP 7  --   --  9  --  9 8   < > 5 5 6  --  5  --  5   GLC 97 102* 116* 83  --  99 104*   < > 88 102* 96  --  91   < > 106*   BUN 31.0*  --   --  25.6*  --  26.8* 26.2*   < > 30 27 27  --  29   < > 31*   CR 1.35*  --  1.38* 1.41* 1.5* 1.25* 1.26*   < > 1.22 1.26* 1.21  --  1.28*   < > 1.17   GFRESTIMATED 54*  --  53* 52* 48* 60* 59*   < > 58* 56* 59*  --  56*   < > 62   GFRESTBLACK  --   --   --   --   --    --   --   --  68 65 69  --  65  --  72   JOHN 9.1  --   --  9.0  --  8.9 8.9   < > 8.6 8.2* 8.8  --  8.5   < > 8.7   PHOS 2.5  --   --  3.1  --  2.5 2.8   < > 3.1 3.0 2.8   < >  --   --   --    PROTTOTAL  --   --   --   --   --   --   --   --   --   --   --   --  6.4*  --  6.7*   ALBUMIN 4.4  --   --  4.4  --  4.5 4.5   < > 4.0 3.9 4.2  --  3.9   < > 3.9   BILITOTAL  --   --   --   --   --   --   --   --   --   --   --   --  0.3  --  0.3   ALKPHOS  --   --   --   --   --   --   --   --   --   --   --   --  63  --  65   AST  --   --   --   --   --   --   --   --   --   --   --   --  16  --  18   ALT  --   --   --   --   --   --   --   --   --   --   --   --  19  --  24    < > = values in this interval not displayed.     CBC  Recent Labs   Lab Test 05/02/25  0927 01/02/25  0552 01/01/25  0624 11/01/24  1050 05/09/24  1251 01/18/24  1209   HGB 12.6* 11.8* 12.1* 13.0* 13.7 13.9   WBC 5.9  --   --  6.3 5.6 5.6   RBC 3.99*  --   --  3.89* 4.15* 4.28*   HCT 37.6*  --   --  37.4* 39.6* 39.9*   MCV 94  --   --  96 95 93   MCH 31.6  --   --  33.4* 33.0 32.5   MCHC 33.5  --   --  34.8 34.6 34.8   RDW 12.5  --   --  12.4 12.6 12.8     --   --  212 202 211       Recent Labs   Lab Test 03/31/22  1248 09/30/21  1347 10/29/20  1410 07/30/20  1331 12/19/19  1405 06/13/19  1400 03/28/19  1501   UTPG 0.16 0.16 0.12 0.12 0.11 0.11 0.10         MRA [facility] 4/5/22  History: Cerebral aneurysm, follow-up; Cerebral aneurysm.  ICD-10: Cerebral aneurysm     Comparison:  MRA 4/10/2019      Technique: Noncontrast 3D time-of-flight MRA of the head.     Findings:     Head MRA demonstrates patent major intracranial arteries. No evidence  of flow within treated anterior communicating artery aneurysm. The  anterior communicating artery appears patent. Stable right P1  posterior cerebral artery segment posteromedially oriented focal  outpouching measuring approximately 1 mm from dome to base with a 1 mm  wide neck. This appears to be an  infundibulum, with vessel arising  from the apex. In addition, there is a similar additional infundibulum  near the origin of the left superior cerebellar artery. No new focal  outpouching or aneurysm.     There is no high flow vascular malformation. Fetal origin of the left  posterior cerebral artery.                                                                    Impression:   1.  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 No  new aneurysm.  2.  No definite flow seen in treated anterior communicating artery  aneurysm.     I have personally reviewed the examination and initial interpretation  and I agree with the findings.     DREAD WESTBROOK MD       CARDIAC STRESS ECHOCARDIOGRAM (11/04/2021 1:13 PM CDT)  Miscellaneous Results - CARDIAC STRESS ECHOCARDIOGRAM (11/04/2021 1:13 PM CDT)  Specimen (Source) Anatomical Location / Laterality Collection Method / Volume Collection Time Received Time         11/04/2021 1:13 PM CDT       Miscellaneous Results - CARDIAC STRESS ECHOCARDIOGRAM (11/04/2021 1:13 PM CDT)  Narrative   PROSOLV - 11/04/2021 2:43 PM CDT   Summary   1. The patient exercised for 8 minutes and 25 seconds on the  Preston  protocol.   2. Frequent PACs at rest and with stress, PVCs at peak exercise.   3. Good functional capacity for age. No chest pain.   4. Normal left ventricular systolic function with no regional wall motion  abnormalities noted at rest.   LVEF 60%.   5. Post stress, no wall motion abnormalities seen.   LV systolic size  decreases and systolic function increases appropriately.   6. Normal stress echocardiogram.   7. Mildly dilated aortic root of 4.2 cm is incidentally noted on the  resting  images.      Report Signatures  Stress ECG Finalized by Nadine Kang on 11/4/2021 02:42 PM  Echo Finalized by Nadine Kang on 11/4/2021 02:42 PM          ASSESSMENT AND PLAN:   Mr Yeager presents with PKD.  "Previously, we reviewed together the results of the MRI, the estimated TKV, and the estimated anticipated rate of progression of CKD given the current data. It was explained that he would most likely not benefit from Tolvaptan.    May 2, 2025  Mr Yeager is stable from the point of view of PKD   His renal function remains  Stable and well preserved. He has no albuminuria.  No  spms    BP:  BP suboptimal again today.  I made no changes to his meds today     Cerebral aneurysm: the repeat MRI did not show a significant change in the previously observed aneurysm -> next MRA tentatively planned for ~ 2029.    Night sweats:.  I reviewed the results of the work up initiated by Dr Palomo ([facility]), including the report of the CT of Chest / abd/pelvis with contrast (uploaded to Media section on 5/2/25).  It did not reveal adenopathy, or splenomegaly.  No suspicious renal lesions.    ## there is a report of \"Quite subtle tree-in-bud airspace opacities in the lateral aspect of the right upper lobe best seen on MIP imaging are likely infectious/inflammatory.    Ovoid vertically oriented nodule in the left lower lobe, true nodule versus scar. Maximally measuring 7mm.  Six-month follow up CT recommended\"  Given these findings, and the history of drenching night sweats,  I will obtain a Quantiferon Gold test.  Recommend referral to Pulmonary Medicine and ID.     Return to clinic in 3 months    Feroz Maria MD  Division of Nephrology and Hypertension              Again, thank you for allowing me to participate in the care of your patient.      Sincerely,    Feroz Maria MD    "

## 2025-05-02 NOTE — PROGRESS NOTES
"Nephrology Clinic    Tariq Yeager MRN:[id number] YOB: 1945    REASON FOR FOLLOW UP: Polycystic kidney disease    HISTORY OF PRESENT ILLNESS:  Date of initial Service: October 29, 2020  Tariq Yeager is a 73 year old male who presented initially for multiple kidney cysts, consistent with PKD.   Mr Yeager has recently been informed about the presence of bilateral renal cysts which were incidentally identified by MRI of the spine he received in early February 2018 for back pain.  The presence of bilateral renal cysts was confirmed by ultrasound also obtained recently. (2/11/19 [facility])(see below).  He denies any history of gross hematuria, flank pain, urolithiasis, or UTIs.  He does however have a history of acute sudden incerebral hemorrhage in 2002 for which he had a prolonged ICU hospitalization at [facility] .  Note is made that he also underwent placement of a permanent IVC filter, although Mr Yeager does not recall having had a DVT.  Prior to that event, it does not appear that he was in good health.  He had an inguinal herniorrhaphy on the R and reports a recurrence of the hernia + new Inguinal hernia on the L. No history of diverticulosis     June 13, 2019  Since the last visit, he had an MRI of Brain and Abd.  MRI of the brain revealed a small aneurysm for which I put in a referral to Dr STAN Robles.  Aneurysm was deemed very small and recommended repeat MRI in 2 year.  MRI of Abd revealed an estimated total kidney volume of 1627 ml, with class IB risk  category of progression of ADPKD, with an estimated projected eGFR ~ 24 ml/min in 10 years.  He is moderating his caffeine intake, salt intake.    Still swims 3 x a week.  Feels generally well.  No HA.     December 19, 2019  Since last visit, had a cologard test that came back positive. He is scheduled to have a colonoscopy in Jan 2020.  Otherwise feels \"very well physically\" \"totally boring\"  Swims 25 min twice a " "week, without CP or ESCOBEDO.    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  No L Ext swelling.  No gross hematuria or  symptoms.  Rare diarrhea.  No abd pain.  No HA.  Admits to forgetting his meds on occasion (estimates he takes his meds 5 days a week)  Drinks 6 cups of coffee a day.      July 30, 2020  Since last visit, he notes doing well. No hospitalizations or significant illnesses. No problems with hematuria or dysuria.  No abd pain. Continues to swim regularly, 3-4x per week.   He continues to have chronic headaches, almost nightly, for many years. No new symptoms or change in character. He does snore quite loudly per the wife, but no witnessed apnea. Does not take Tylenol or Advil.  In terms of BP, home BP is running 130-140/80-90. Remains on amlodipine and lisinopril, good adherence.  Of note, he did have a fall while fixing some gutters, and did briefly lose consciousness. Feeling fine now without issue. Did not seek medical attention.     October 29, 2020  24 hour BP monitoring  July 30, 2020  Overall Syst  +/- 11.67  Diast BP 74+/-5.8  Pulse pressure 62+/-10.0   HR 58+/-8.9  Syst > limits 52%, Diast > limit 21%  Please see scanned document for full results  Of Note, most of the that day, he was very active doing repairs \"on a ladder\".       Has new BP machine 122-156  Most > 135  Drinks about 8 x 6 oz of coffee, has cut down to 3 cups a day.  Continues to cut down salt, paying attention to sodium content of foods.  Continues to swim 3 x a week, currently doing 14 laps + exersices,  Plus once a week aerobic exercise.    September 30, 2021  Overall feeling well. Blood pressure control sub-optimal with systolic blood pressure ranging between 130-150. Is trying to cut down on caffeine intake. Down to 4 cups a day. No interim hospitalization or change in meds. Started taking Lisinopril 30 mg about a week ago (before this was taking 20 mg).     ROS;   Occasional mild flank pain.  Leg cramps + (trying Tonic water - has " quinine, which he reports helps)  No GI symptoms.  No  symptoms,   Stream may have slowed down.  No L Ext swelling.  No Change in L inguinal hernia.        March 31, 2022  Feels generally well.  To have repeat MRI of brain next week to follow up on small aneurysm seen 2 years ago.  No GI or  symptoms  Had nocturnal pressure CP, recurrent. -> had stress echo in Nov 2021 which was normal  Taking losartan 50 mg (instead of 100)    January 18, 2024  Mr Tavares presents with no acute complaints.  He feels generally well and remains physically active.  He raised questions about his L inguinal hernia which he discussed with his PCP, but did not have evaluated by surgery.  It has caused no acute pain and is easily reducible by his description.  He then reported some RLQ localized pain superior to the inguinal ligament, without a visible or palpable bulge.   He otherwise has no HA, gross hematuria, dysuria or GI complaints.  No SOB or CP.   BP at home remains suboptimally controlled with SBP usually > 140.  No L Ext swelling      May 9, 2024  Had L inguinal hernia repair Feb 29  Has suprapubic discomfort and saw Dr Ware today.  Feels that BP is not as good on Losartan, would like to go back to lisinopril.  At home -163.  He does not want to take hydrochlorothiazide   No CP, SOB, flank pain  Does have pain in right inguinal area as well.  No L Ext swelling.  No gross hematuria.    November 1, 2024  Feels generally well.  He had a L inguinal hernia repaired by Dr Ware on 2/26/24   He also underwent cataract surgery right eye on 6/26/24  He had a virtual visit with Dr Robles, neurosurgery on 4/30/24.  Repeat MRA without significant change from previous. It was determined that he could have the next repeat MRA in 5 years (around 4/2029)  He denies any gross hematuria or flank pain. No L Ext swelling.   No GI or  symptoms.    May 2, 2025  Had L knee replacment.  Still having some swelling and pain.  Undergoing  PT  C/o drenching night sweats since surgery.  He has sweats every night, but sometimes has to change his shirt once or twice a night.   Discussed with his PCP.   Reports having had imaging of the chest and abdomen.  No F/chill/cough.    Never smoker  Weight is stable.   Appetite is good.  No Add pain GI or  spms.   No recent travel outside of [place].    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/2019    Eczema     ED (erectile dysfunction)     History of aneurysm     small brain aneurysm being montired by Neurology    Hyperlipidemia     Hypertension 2002    Inguinal hernia 2016    Presence of IVC filter 2002    Thrombosis 2002    DVT when hospitalized     PAST SURGICAL HISTORY:  Past Surgical History:   Procedure Laterality Date    ARTHROPLASTY KNEE Left 12/31/2024    Procedure: LEFT TOTAL KNEE ARTHROPLASTY;  Surgeon: Danyel Em MD;  Location: [facility] OR    CATARACT EXTRACTION Right 06/26/2024    CATARACT EXTRACTION Left 07/24/2024    cerebral aneurysm coil  2002    CRANIOTOMY  2002    HERNIORRHAPHY INGUINAL Right 2016    HERNIORRHAPHY INGUINAL Left 02/26/2024    Procedure: HERNIORRHAPHY, INGUINAL, OPEN LEFT;  Surgeon: Ra Ware MD;  [place] [facility] [place]     MEDICATIONS: reviewed with patient on 5/2/25  Current Outpatient Medications   Medication Sig Dispense Refill    amLODIPine (NORVASC) 10 MG tablet Take 1 tablet (10 mg) by mouth daily. 90 tablet 3    hydrochlorothiazide (HYDRODIURIL) 25 MG tablet Take 1 tablet (25 mg) by mouth daily. 90 tablet 3    lisinopril (ZESTRIL) 40 MG tablet Take 1 tablet (40 mg) by mouth daily. 90 tablet 3    senna-docusate (SENOKOT-S/PERICOLACE) 8.6-50 MG tablet Take 1 tablet  "by mouth 2 times daily as needed for constipation.      sildenafil (REVATIO) 20 MG tablet   See Instructions, Instructions: TAKE THREE TO FIVE TABLETS BY MOUTH 30 MINUTES PRIOR TO SEXUAL ACTIVITY, # 20 tab(s), 0 Refill(s), Pharmacy: [facility] #[id number], TAKE THREE TO FIVE TABLETS BY MOUTH 30 MINUTES PRIOR TO SEXUAL ACTIVITY, 67, in, 10/25...     ALLERGIES:    Allergies   Allergen Reactions    Iodinated Contrast Media Rash    Dilantin [Phenytoin] Rash     REVIEW OF SYSTEMS:  A comprehensive review of systems was performed and found to be negative except as described here or above.     SOCIAL HISTORY:   Social History     Socioeconomic History    Marital status:      Spouse name: Not on file    Number of children: Not on file    Years of education: Not on file    Highest education level: Not on file   Occupational History    Not on file   Social Needs    Financial resource strain: Not on file    Food insecurity:     Worry: Not on file     Inability: Not on file    Transportation needs:     Medical: Not on file     Non-medical: Not on file   Tobacco Use    Smoking status: Never Smoker    Smokeless tobacco: Never Used   Substance and Sexual Activity    Alcohol use: No     Frequency: Never.  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  Remote history of use > 40 yrs ago    Sexual activity: Not on file   Lifestyle    Physical activity:     Days per week: Swims 3x/week     Minutes per session: 25 min    Stress: Not on file   Relationships   Other Topics Concern    Not on file   Social History Narrative    Not on file     FAMILY MEDICAL HISTORY:   Family History   Problem Relation Age of Onset    Kidney failure Mother     Cystic Kidney Disease Sister    Sister (70) was recently diagnosed with PKD by MRI, with multiple bilat cyst  Brother (77) with possible PKD (with few cysts on one side only)  Sister (75) does not want to be evaluated, but used to run marathons, and still runs " half-marathons.    PHYSICAL EXAM:   BP (!) 146/80   Pulse 53   Temp 97.5  F (36.4  C) (Oral)   Wt 68 kg (150 lb)   SpO2 97%   BMI 23.49 kg/m     GENERAL APPEARANCE: alert and no distress  EYES: nonicteric  NECK: supple, no adenopathy.   RESP: lungs clear to auscultation   CV: regular rhythm, normal rate, no rub  ABDOMEN: soft,     Extremities: no edema  MS: L knee incision well healed.  Knee is warm with some swelling, but there is no redness or tenderness.  Good ROM without pain.   No overt effusion.  SKIN: no rash  NEURO: mentation intact and speech normal  PSYCH: affect normal/bright     LABS:   CMP  Recent Labs   Lab Test 05/02/25  0927 01/02/25  0552 01/01/25  0624 11/01/24  1050 07/01/24  1538 05/09/24  1251 01/18/24  1209 09/30/21  1340 10/29/20  1407 07/30/20  1328 12/19/19  1416 06/13/19  1354 06/13/19  1354 04/05/19  1249 03/28/19  1456     --   --  139  --  140 141   < > 142 138 141  --  140   < > 142   POTASSIUM 3.9  --   --  3.7  --  4.3 4.3   < > 3.8 3.8 4.0  --  4.1   < > 4.0   CHLORIDE 106  --   --  102  --  108* 107   < > 110* 106 109  --  109   < > 109   CO2 28  --   --  28  --  23 26   < > 27 27 27  --  26   < > 27   ANIONGAP 7  --   --  9  --  9 8   < > 5 5 6  --  5  --  5   GLC 97 102* 116* 83  --  99 104*   < > 88 102* 96  --  91   < > 106*   BUN 31.0*  --   --  25.6*  --  26.8* 26.2*   < > 30 27 27  --  29   < > 31*   CR 1.35*  --  1.38* 1.41* 1.5* 1.25* 1.26*   < > 1.22 1.26* 1.21  --  1.28*   < > 1.17   GFRESTIMATED 54*  --  53* 52* 48* 60* 59*   < > 58* 56* 59*  --  56*   < > 62   GFRESTBLACK  --   --   --   --   --   --   --   --  68 65 69  --  65  --  72   JOHN 9.1  --   --  9.0  --  8.9 8.9   < > 8.6 8.2* 8.8  --  8.5   < > 8.7   PHOS 2.5  --   --  3.1  --  2.5 2.8   < > 3.1 3.0 2.8   < >  --   --   --    PROTTOTAL  --   --   --   --   --   --   --   --   --   --   --   --  6.4*  --  6.7*   ALBUMIN 4.4  --   --  4.4  --  4.5 4.5   < > 4.0 3.9 4.2  --  3.9   < > 3.9   BILITOTAL   --   --   --   --   --   --   --   --   --   --   --   --  0.3  --  0.3   ALKPHOS  --   --   --   --   --   --   --   --   --   --   --   --  63  --  65   AST  --   --   --   --   --   --   --   --   --   --   --   --  16  --  18   ALT  --   --   --   --   --   --   --   --   --   --   --   --  19  --  24    < > = values in this interval not displayed.     CBC  Recent Labs   Lab Test 05/02/25  0927 01/02/25  0552 01/01/25  0624 11/01/24  1050 05/09/24  1251 01/18/24  1209   HGB 12.6* 11.8* 12.1* 13.0* 13.7 13.9   WBC 5.9  --   --  6.3 5.6 5.6   RBC 3.99*  --   --  3.89* 4.15* 4.28*   HCT 37.6*  --   --  37.4* 39.6* 39.9*   MCV 94  --   --  96 95 93   MCH 31.6  --   --  33.4* 33.0 32.5   MCHC 33.5  --   --  34.8 34.6 34.8   RDW 12.5  --   --  12.4 12.6 12.8     --   --  212 202 211       Recent Labs   Lab Test 03/31/22  1248 09/30/21  1347 10/29/20  1410 07/30/20  1331 12/19/19  1405 06/13/19  1400 03/28/19  1501   UTPG 0.16 0.16 0.12 0.12 0.11 0.11 0.10         MRA [place] [facility] [place] 4/5/22  History: Cerebral aneurysm, follow-up; Cerebral aneurysm.  ICD-10: Cerebral aneurysm     Comparison:  MRA 4/10/2019      Technique: Noncontrast 3D time-of-flight MRA of the head.     Findings:     Head MRA demonstrates patent major intracranial arteries. No evidence  of flow within treated anterior communicating artery aneurysm. The  anterior communicating artery appears patent. Stable right P1  posterior cerebral artery segment posteromedially oriented focal  outpouching measuring approximately 1 mm from dome to base with a 1 mm  wide neck. This appears to be an infundibulum, with vessel arising  from the apex. In addition, there is a similar additional infundibulum  near the origin of the left superior cerebellar artery. No new focal  outpouching or aneurysm.     There is no high flow vascular malformation. Fetal origin of the left  posterior cerebral artery.                                                                     Impression:   1.  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 No  new aneurysm.  2.  No definite flow seen in treated anterior communicating artery  aneurysm.     I have personally reviewed the examination and initial interpretation  and I agree with the findings.     DREAD WESTBROOK MD       CARDIAC STRESS ECHOCARDIOGRAM (11/04/2021 1:13 PM CDT)  Miscellaneous Results - CARDIAC STRESS ECHOCARDIOGRAM (11/04/2021 1:13 PM CDT)  Specimen (Source) Anatomical Location / Laterality Collection Method / Volume Collection Time Received Time         11/04/2021 1:13 PM CDT       Miscellaneous Results - CARDIAC STRESS ECHOCARDIOGRAM (11/04/2021 1:13 PM CDT)  Narrative   PROSOLV - 11/04/2021 2:43 PM CDT   Summary   1. The patient exercised for 8 minutes and 25 seconds on the  Preston  protocol.   2. Frequent PACs at rest and with stress, PVCs at peak exercise.   3. Good functional capacity for age. No chest pain.   4. Normal left ventricular systolic function with no regional wall motion  abnormalities noted at rest.   LVEF 60%.   5. Post stress, no wall motion abnormalities seen.   LV systolic size  decreases and systolic function increases appropriately.   6. Normal stress echocardiogram.   7. Mildly dilated aortic root of 4.2 cm is incidentally noted on the  resting  images.      Report Signatures  Stress ECG Finalized by Nadine Kang on 11/4/2021 02:42 PM  Echo Finalized by Nadine Kang on 11/4/2021 02:42 PM          ASSESSMENT AND PLAN:   Mr Yeager presents with PKD. Previously, we reviewed together the results of the MRI, the estimated TKV, and the estimated anticipated rate of progression of CKD given the current data. It was explained that he would most likely not benefit from Tolvaptan.    May 2, 2025  Mr Yeager is stable from the point of view of PKD   His renal function remains  Stable and well preserved. He has no  "albuminuria.  No  spms    BP:  BP suboptimal again today.  I made no changes to his meds today     Cerebral aneurysm: the repeat MRI did not show a significant change in the previously observed aneurysm -> next MRA tentatively planned for ~ 2029.    Night sweats:.  I reviewed the results of the work up initiated by Dr Palomo ([facility]), including the report of the CT of Chest / abd/pelvis with contrast (uploaded to Media section on 5/2/25).  It did not reveal adenopathy, or splenomegaly.  No suspicious renal lesions.    ## there is a report of \"Quite subtle tree-in-bud airspace opacities in the lateral aspect of the right upper lobe best seen on MIP imaging are likely infectious/inflammatory.    Ovoid vertically oriented nodule in the left lower lobe, true nodule versus scar. Maximally measuring 7mm.  Six-month follow up CT recommended\"  Given these findings, and the history of drenching night sweats,  I will obtain a Quantiferon Gold test.  Recommend referral to Pulmonary Medicine and ID.     Return to clinic in 3 months    Feroz Maria MD  Division of Nephrology and Hypertension            "

## 2025-05-05 ENCOUNTER — TELEPHONE (OUTPATIENT)
Dept: NEPHROLOGY | Facility: CLINIC | Age: 80
End: 2025-05-05
Payer: COMMERCIAL

## 2025-05-06 ENCOUNTER — TELEPHONE (OUTPATIENT)
Dept: PULMONOLOGY | Facility: CLINIC | Age: 80
End: 2025-05-06
Payer: COMMERCIAL

## 2025-05-06 NOTE — TELEPHONE ENCOUNTER
Left Voicemail (1st Attempt) and Sent Mychart (1st Attempt) for the patient to call back and schedule the following:    Appointment type: New Pulm  Provider: NA  Return date: Next Avail ok'd per clinic  Specialty phone number: [phone]  Additional appointment(s) needed: full pft and CXR  Additonal Notes: Gen pulm per revuew

## 2025-05-06 NOTE — TELEPHONE ENCOUNTER
Faxed CT report and clinic note for 2/28/25 received and forwarded to Dr. Maria and Genetics Nurse Navigator for review and follow up.  Dorita Lama RN, BSN, PHN   Care Coordinator  [phone]

## 2025-05-08 DIAGNOSIS — R91.8 LUNG INFILTRATE ON CT: Primary | ICD-10-CM

## 2025-05-08 NOTE — TELEPHONE ENCOUNTER
Patient Contacted for the patient to call back and schedule the following:    Appointment type: New Pulm  Provider: NA  Return date: Next Avail ok'd per clinic  Specialty phone number: [phone]  Additional appointment(s) needed: full pft and CXR  Additonal Notes: Gen pulm per review

## 2025-06-03 ENCOUNTER — TELEPHONE (OUTPATIENT)
Dept: NEPHROLOGY | Facility: CLINIC | Age: 80
End: 2025-06-03
Payer: COMMERCIAL

## 2025-06-03 NOTE — TELEPHONE ENCOUNTER
Patient confirmed scheduled appointment:     Date: 8/15/25       Time: 9:30 AM  Appointment type: Return Kidney Genetics  Visit mode: In Person  Provider: Dr. Feroz Maria   Testing/imaging: Testing scheduled prior to appt

## 2025-06-14 ENCOUNTER — HEALTH MAINTENANCE LETTER (OUTPATIENT)
Age: 80
End: 2025-06-14

## 2025-07-29 ENCOUNTER — ANCILLARY PROCEDURE (OUTPATIENT)
Dept: GENERAL RADIOLOGY | Facility: CLINIC | Age: 80
End: 2025-07-29
Attending: INTERNAL MEDICINE
Payer: COMMERCIAL

## 2025-07-29 ENCOUNTER — OFFICE VISIT (OUTPATIENT)
Dept: PULMONOLOGY | Facility: CLINIC | Age: 80
End: 2025-07-29
Attending: INTERNAL MEDICINE
Payer: COMMERCIAL

## 2025-07-29 VITALS — HEART RATE: 53 BPM | OXYGEN SATURATION: 97 % | SYSTOLIC BLOOD PRESSURE: 121 MMHG | DIASTOLIC BLOOD PRESSURE: 78 MMHG

## 2025-07-29 DIAGNOSIS — D49.89 NEOPLASM OF MEDIASTINAL LYMPH NODES: ICD-10-CM

## 2025-07-29 DIAGNOSIS — R59.1 LYMPHADENOPATHY: ICD-10-CM

## 2025-07-29 DIAGNOSIS — R91.8 LUNG INFILTRATE ON CT: ICD-10-CM

## 2025-07-29 DIAGNOSIS — R61 NIGHT SWEATS: Primary | ICD-10-CM

## 2025-07-29 LAB
DLCOUNC-%PRED-PRE: 87 %
DLCOUNC-PRE: 20.12 ML/MIN/MMHG
DLCOUNC-PRED: 22.96 ML/MIN/MMHG
ERV-%PRED-PRE: 170 %
ERV-PRE: 1.86 L
ERV-PRED: 1.09 L
EXPTIME-PRE: 10.56 SEC
FEF2575-%PRED-PRE: 86 %
FEF2575-PRE: 1.64 L/SEC
FEF2575-PRED: 1.89 L/SEC
FEFMAX-%PRED-PRE: 106 %
FEFMAX-PRE: 7.43 L/SEC
FEFMAX-PRED: 6.97 L/SEC
FEV1-%PRED-PRE: 112 %
FEV1-PRE: 2.92 L
FEV1FEV6-PRE: 74 %
FEV1FEV6-PRED: 77 %
FEV1FVC-PRE: 69 %
FEV1FVC-PRED: 76 %
FEV1SVC-PRE: 70 L
FEV1SVC-PRED: 68 L
FIFMAX-PRE: 6.31 L/SEC
FRCPLETH-%PRED-PRE: 116 %
FRCPLETH-PRE: 4.21 L
FRCPLETH-PRED: 3.62 L
FVC-%PRED-PRE: 120 %
FVC-PRE: 4.22 L
FVC-PRED: 3.49 L
GAW-PRED: 1.03 L/S/CMH2O
IC-%PRED-PRE: 86 %
IC-PRE: 2.34 L
IC-PRED: 2.72 L
Lab: 90 %
RVPLETH-%PRED-PRE: 93 %
RVPLETH-PRE: 2.35 L
RVPLETH-PRED: 2.51 L
SGAW-PRED: 0.2 1/CMH2O*S
SRAW-PRED: < 4.76 CMH2O*S
TLCPLETH-%PRED-PRE: 100 %
TLCPLETH-PRE: 6.55 L
TLCPLETH-PRED: 6.51 L
VA-%PRED-PRE: 99 %
VA-PRE: 5.78 L
VC-%PRED-PRE: 110 %
VC-PRE: 4.2 L
VC-PRED: 3.81 L

## 2025-07-29 PROCEDURE — 99204 OFFICE O/P NEW MOD 45 MIN: CPT | Performed by: INTERNAL MEDICINE

## 2025-07-29 PROCEDURE — 71046 X-RAY EXAM CHEST 2 VIEWS: CPT | Mod: GC | Performed by: RADIOLOGY

## 2025-07-29 PROCEDURE — G0463 HOSPITAL OUTPT CLINIC VISIT: HCPCS | Performed by: INTERNAL MEDICINE

## 2025-07-29 PROCEDURE — 3078F DIAST BP <80 MM HG: CPT | Performed by: INTERNAL MEDICINE

## 2025-07-29 PROCEDURE — 94375 RESPIRATORY FLOW VOLUME LOOP: CPT | Performed by: INTERNAL MEDICINE

## 2025-07-29 PROCEDURE — 1125F AMNT PAIN NOTED PAIN PRSNT: CPT | Performed by: INTERNAL MEDICINE

## 2025-07-29 PROCEDURE — G2211 COMPLEX E/M VISIT ADD ON: HCPCS | Performed by: INTERNAL MEDICINE

## 2025-07-29 PROCEDURE — 94729 DIFFUSING CAPACITY: CPT | Performed by: INTERNAL MEDICINE

## 2025-07-29 PROCEDURE — 94726 PLETHYSMOGRAPHY LUNG VOLUMES: CPT | Performed by: INTERNAL MEDICINE

## 2025-07-29 PROCEDURE — 94150 VITAL CAPACITY TEST: CPT | Performed by: INTERNAL MEDICINE

## 2025-07-29 PROCEDURE — 3074F SYST BP LT 130 MM HG: CPT | Performed by: INTERNAL MEDICINE

## 2025-07-29 ASSESSMENT — PAIN SCALES - GENERAL: PAINLEVEL_OUTOF10: MODERATE PAIN (4)

## 2025-07-29 NOTE — PROGRESS NOTES
"[facility]  Pulmonary Medicine  Visit Clinic Note  July 29, 2025    Dear patient. Thank you for visiting with me. I want you to feel respected, understood, and empowered. \"Respect\" is valuing you as much as I would a close family member. \"Empowerment\" happens when you are fully informed, and can make the best possible decision for you.  Please ask me questions!  Challenge anything that is not clear.       ASSESSMENT & PLAN     #Mediastinal Lymphadenopathy:   - Night sweats.  -Will do a Ct chest abdomen and pelvis to reassess.  -If noted then will set him up for a biopsy to rule out malignancy especially lymphoma..     RTC in 1 weeks. Virtual visit.      45 minutes excluding the time spent on cigarette cessation was  spent on the date of the encounter doing chart review, history and exam, documentation and further activities as noted above.    These conclusions are made at the best of one's knowledge and belief based on the provided evidence such as patient's history and allergy test results and they can change over time or can be incomplete because of missing information's.    I explained the lab values, imagings and findings to the patient.  Patient expressed understanding I did not recognize any barriers to the understanding of the patient.    The above note was dictated using voice recognition software and may include typographical errors. Please contact the author for any clarifications.    Juan Figueredo MD MPH FCCP  RN Coordinators: Josh Nelson/ Rupa: [phone]  ILD RN Coordinators: [phone]  Clinic Number: [phone]  Pager: [phone]       Today's visit note:     Chief Complaint: Tariq Yeager is a 79 year old year old male who is being seen for New Pulmonary      HPI:   Patient is a 79-year-old male who presents to pulmonary clinic for further management of his night sweats.    He has been follwoed by Dr. EDGE for his Polycystic kidney disease.  He had prior complaints of " night sweats.  He had mesiatinal lymphadenopathy noted on Ct chest and freferred to pulmonary for further work up.     No smoking history. Overall a functional person. No weight loss.          Medications:     Current Outpatient Medications   Medication Sig Dispense Refill    amLODIPine (NORVASC) 10 MG tablet Take 1 tablet (10 mg) by mouth daily. 90 tablet 3    aspirin 81 MG EC tablet Take 1 tablet (81 mg) by mouth 2 times daily. 84 tablet 0    hydrochlorothiazide (HYDRODIURIL) 25 MG tablet Take 1 tablet (25 mg) by mouth daily. 90 tablet 3    lisinopril (ZESTRIL) 40 MG tablet Take 1 tablet (40 mg) by mouth daily. 90 tablet 3    senna-docusate (SENOKOT-S/PERICOLACE) 8.6-50 MG tablet Take 1 tablet by mouth 2 times daily as needed for constipation.      sildenafil (REVATIO) 20 MG tablet   See Instructions, Instructions: TAKE THREE TO FIVE TABLETS BY MOUTH 30 MINUTES PRIOR TO SEXUAL ACTIVITY, # 20 tab(s), 0 Refill(s), Pharmacy: [facility] #[id number], TAKE THREE TO FIVE TABLETS BY MOUTH 30 MINUTES PRIOR TO SEXUAL ACTIVITY, 67, in, 10/25...      hydrOXYzine HCl (ATARAX) 25 MG tablet Take 1 tablet (25 mg) by mouth every 6 hours as needed for other (adjuvant pain). (Patient not taking: Reported on 7/29/2025) 25 tablet 0     No current facility-administered medications for this visit.            Review of Systems:       A complete 10 point review of systems was otherwise negative except as noted in the HPI.        PHYSICAL EXAM:  /78 (BP Location: Right arm, Patient Position: Sitting, Cuff Size: Adult Regular)   Pulse 53   SpO2 97%      General: Well developed, well nourished, No apparent distress  Eyes: Anicteric  Nose: Nasal mucosa with no edema or hyperemia.  No polyps  Ears: Hearing grossly normal  Mouth: Oral mucosa is moist, without any lesions. No oropharyngeal exudate.  Respiratory: Good air movement. No crackles. No rhonchi. No wheezes  Cardiac: RRR, normal S1, S2. No murmurs. No JVD  Abdomen: Soft,  NT/ND  Musculoskeletal: Extremities normal. No clubbing. No cyanosis. No edema.  Skin: No rash on limited exam  Neuro: Normal mentation. Normal speech.  Psych:Normal affect           Data:   All laboratory and imaging data reviewed.      PFT:       PFT Interpretation:  I personally reviewed and interpreted the PFTs.    CXR: I personally reviewed and interpreted the chest x-ray    Chest CT: I personally reviewed and interpreted the CT scan.    Recent Results (from the past week)   Pulmonary function test    Collection Time: 07/29/25  9:11 AM   Result Value Ref Range    FVC-Pred 3.49 L    FEV1SVC-Pred 68 L    FEV1FEV6-Pred 77 %    FEV1FVC-Pred 76 %    FEFMax-Pred 6.97 L/sec    FEF2575-Pred 1.89 L/sec    FVC-Pre 4.22 L    FVC-%Pred-Pre 120 %    FEV1SVC-Pre 70 L    FEV1-Pre 2.92 L    FEV1-%Pred-Pre 112 %    FEV1FEV6-Pre 74 %    FEV1FVC-Pre 69 %    CVA1GEE-%Pred-Pre 90 %    FEFMax-Pre 7.43 L/sec    FEFMax-%Pred-Pre 106 %    FEF2575-Pre 1.64 L/sec    XYW3547-%Pred-Pre 86 %    FIFMax-Pre 6.31 L/sec    ExpTime-Pre 10.56 sec    VC-Pred 3.81 L    IC-Pred 2.72 L    ERV-Pred 1.09 L    VC-Pre 4.20 L    VC-%Pred-Pre 110 %    IC-Pre 2.34 L    IC-%Pred-Pre 86 %    ERV-Pre 1.86 L    ERV-%Pred-Pre 170 %    FRCPleth-Pred 3.62 L    RVPleth-Pred 2.51 L    TLCPleth-Pred 6.51 L    Gaw-Pred 1.03 L/s/cmH2O    sRaw-Pred < 4.76 cmH2O*s    sGaw-Pred 0.20 1/cmH2O*s    FRCPleth-Pre 4.21 L    FRCPleth-%Pred-Pre 116 %    RVPleth-Pre 2.35 L    RVPleth-%Pred-Pre 93 %    TLCPleth-Pre 6.55 L    TLCPleth-%Pred-Pre 100 %    DLCOunc-Pred 22.96 ml/min/mmHg    DLCOunc-Pre 20.12 ml/min/mmHg    DLCOunc-%Pred-Pre 87 %    VA-Pre 5.78 L    VA-%Pred-Pre 99 %

## 2025-07-29 NOTE — LETTER
"7/29/2025      Tariq Yeager  [address]  [place] [address]      Dear Colleague,    Thank you for referring your patient, Tariq Yeager, to the [facility]. Please see a copy of my visit note below.    [facility]  Pulmonary Medicine  Visit Clinic Note  July 29, 2025    Dear patient. Thank you for visiting with me. I want you to feel respected, understood, and empowered. \"Respect\" is valuing you as much as I would a close family member. \"Empowerment\" happens when you are fully informed, and can make the best possible decision for you.  Please ask me questions!  Challenge anything that is not clear.       ASSESSMENT & PLAN     #Mediastinal Lymphadenopathy:   - Night sweats.  -Will do a Ct chest abdomen and pelvis to reassess.  -If noted then will set him up for a biopsy to rule out malignancy especially lymphoma..     RTC in 1 weeks. Virtual visit.      45 minutes excluding the time spent on cigarette cessation was  spent on the date of the encounter doing chart review, history and exam, documentation and further activities as noted above.    These conclusions are made at the best of one's knowledge and belief based on the provided evidence such as patient's history and allergy test results and they can change over time or can be incomplete because of missing information's.    I explained the lab values, imagings and findings to the patient.  Patient expressed understanding I did not recognize any barriers to the understanding of the patient.    The above note was dictated using voice recognition software and may include typographical errors. Please contact the author for any clarifications.    Juan Figueredo MD MPH FCCP  RN Coordinators: Josh Nelson/ Rpua: [phone]  ILD RN Coordinators: [phone]  Clinic Number: [phone]  Pager: [phone]       Today's visit note:     Chief Complaint: Tariq Yeager is a 79 " year old year old male who is being seen for New Pulmonary      HPI:   Patient is a 79-year-old male who presents to pulmonary clinic for further management of his night sweats.    He has been follwoed by Dr. EDGE for his Polycystic kidney disease.  He had prior complaints of night sweats.  He had mesiatinal lymphadenopathy noted on Ct chest and freferred to pulmonary for further work up.     No smoking history. Overall a functional person. No weight loss.          Medications:     Current Outpatient Medications   Medication Sig Dispense Refill     amLODIPine (NORVASC) 10 MG tablet Take 1 tablet (10 mg) by mouth daily. 90 tablet 3     aspirin 81 MG EC tablet Take 1 tablet (81 mg) by mouth 2 times daily. 84 tablet 0     hydrochlorothiazide (HYDRODIURIL) 25 MG tablet Take 1 tablet (25 mg) by mouth daily. 90 tablet 3     lisinopril (ZESTRIL) 40 MG tablet Take 1 tablet (40 mg) by mouth daily. 90 tablet 3     senna-docusate (SENOKOT-S/PERICOLACE) 8.6-50 MG tablet Take 1 tablet by mouth 2 times daily as needed for constipation.       sildenafil (REVATIO) 20 MG tablet   See Instructions, Instructions: TAKE THREE TO FIVE TABLETS BY MOUTH 30 MINUTES PRIOR TO SEXUAL ACTIVITY, # 20 tab(s), 0 Refill(s), Pharmacy: [facility] #[id number], TAKE THREE TO FIVE TABLETS BY MOUTH 30 MINUTES PRIOR TO SEXUAL ACTIVITY, 69, in, 10/25...       hydrOXYzine HCl (ATARAX) 25 MG tablet Take 1 tablet (25 mg) by mouth every 6 hours as needed for other (adjuvant pain). (Patient not taking: Reported on 7/29/2025) 25 tablet 0     No current facility-administered medications for this visit.            Review of Systems:       A complete 10 point review of systems was otherwise negative except as noted in the HPI.        PHYSICAL EXAM:  /78 (BP Location: Right arm, Patient Position: Sitting, Cuff Size: Adult Regular)   Pulse 53   SpO2 97%      General: Well developed, well nourished, No apparent distress  Eyes: Anicteric  Nose: Nasal mucosa with  no edema or hyperemia.  No polyps  Ears: Hearing grossly normal  Mouth: Oral mucosa is moist, without any lesions. No oropharyngeal exudate.  Respiratory: Good air movement. No crackles. No rhonchi. No wheezes  Cardiac: RRR, normal S1, S2. No murmurs. No JVD  Abdomen: Soft, NT/ND  Musculoskeletal: Extremities normal. No clubbing. No cyanosis. No edema.  Skin: No rash on limited exam  Neuro: Normal mentation. Normal speech.  Psych:Normal affect           Data:   All laboratory and imaging data reviewed.      PFT:       PFT Interpretation:  I personally reviewed and interpreted the PFTs.    CXR: I personally reviewed and interpreted the chest x-ray    Chest CT: I personally reviewed and interpreted the CT scan.    Recent Results (from the past week)   Pulmonary function test    Collection Time: 07/29/25  9:11 AM   Result Value Ref Range    FVC-Pred 3.49 L    FEV1SVC-Pred 68 L    FEV1FEV6-Pred 77 %    FEV1FVC-Pred 76 %    FEFMax-Pred 6.97 L/sec    FEF2575-Pred 1.89 L/sec    FVC-Pre 4.22 L    FVC-%Pred-Pre 120 %    FEV1SVC-Pre 70 L    FEV1-Pre 2.92 L    FEV1-%Pred-Pre 112 %    FEV1FEV6-Pre 74 %    FEV1FVC-Pre 69 %    EDX4AAF-%Pred-Pre 90 %    FEFMax-Pre 7.43 L/sec    FEFMax-%Pred-Pre 106 %    FEF2575-Pre 1.64 L/sec    ZVY3427-%Pred-Pre 86 %    FIFMax-Pre 6.31 L/sec    ExpTime-Pre 10.56 sec    VC-Pred 3.81 L    IC-Pred 2.72 L    ERV-Pred 1.09 L    VC-Pre 4.20 L    VC-%Pred-Pre 110 %    IC-Pre 2.34 L    IC-%Pred-Pre 86 %    ERV-Pre 1.86 L    ERV-%Pred-Pre 170 %    FRCPleth-Pred 3.62 L    RVPleth-Pred 2.51 L    TLCPleth-Pred 6.51 L    Gaw-Pred 1.03 L/s/cmH2O    sRaw-Pred < 4.76 cmH2O*s    sGaw-Pred 0.20 1/cmH2O*s    FRCPleth-Pre 4.21 L    FRCPleth-%Pred-Pre 116 %    RVPleth-Pre 2.35 L    RVPleth-%Pred-Pre 93 %    TLCPleth-Pre 6.55 L    TLCPleth-%Pred-Pre 100 %    DLCOunc-Pred 22.96 ml/min/mmHg    DLCOunc-Pre 20.12 ml/min/mmHg    DLCOunc-%Pred-Pre 87 %    VA-Pre 5.78 L    VA-%Pred-Pre 99 %                                            Again, thank you for allowing me to participate in the care of your patient.        Sincerely,        Juan Figueredo MD    Electronically signed

## 2025-07-29 NOTE — NURSING NOTE
Chief Complaint   Patient presents with    New Pulmonary     Medications reviewed and vital signs taken.   Augie Barcenas, EMT

## 2025-08-05 ENCOUNTER — OFFICE VISIT (OUTPATIENT)
Dept: PULMONOLOGY | Facility: CLINIC | Age: 80
End: 2025-08-05
Attending: INTERNAL MEDICINE
Payer: MEDICARE

## 2025-08-05 ENCOUNTER — ANCILLARY PROCEDURE (OUTPATIENT)
Dept: CT IMAGING | Facility: CLINIC | Age: 80
End: 2025-08-05
Attending: INTERNAL MEDICINE
Payer: COMMERCIAL

## 2025-08-05 VITALS — OXYGEN SATURATION: 95 % | DIASTOLIC BLOOD PRESSURE: 70 MMHG | HEART RATE: 87 BPM | SYSTOLIC BLOOD PRESSURE: 112 MMHG

## 2025-08-05 DIAGNOSIS — R61 NIGHT SWEATS: ICD-10-CM

## 2025-08-05 DIAGNOSIS — R59.1 LYMPHADENOPATHY: Primary | ICD-10-CM

## 2025-08-05 DIAGNOSIS — R59.1 LYMPHADENOPATHY: ICD-10-CM

## 2025-08-05 DIAGNOSIS — D49.89 NEOPLASM OF MEDIASTINAL LYMPH NODES: ICD-10-CM

## 2025-08-05 PROCEDURE — 1126F AMNT PAIN NOTED NONE PRSNT: CPT | Performed by: INTERNAL MEDICINE

## 2025-08-05 PROCEDURE — 71250 CT THORAX DX C-: CPT | Performed by: RADIOLOGY

## 2025-08-05 PROCEDURE — 3074F SYST BP LT 130 MM HG: CPT | Performed by: INTERNAL MEDICINE

## 2025-08-05 PROCEDURE — 74176 CT ABD & PELVIS W/O CONTRAST: CPT | Mod: GC | Performed by: STUDENT IN AN ORGANIZED HEALTH CARE EDUCATION/TRAINING PROGRAM

## 2025-08-05 PROCEDURE — 99214 OFFICE O/P EST MOD 30 MIN: CPT | Performed by: INTERNAL MEDICINE

## 2025-08-05 PROCEDURE — 3078F DIAST BP <80 MM HG: CPT | Performed by: INTERNAL MEDICINE

## 2025-08-05 PROCEDURE — G0463 HOSPITAL OUTPT CLINIC VISIT: HCPCS | Performed by: INTERNAL MEDICINE

## 2025-08-05 ASSESSMENT — PAIN SCALES - GENERAL: PAINLEVEL_OUTOF10: NO PAIN (0)

## 2025-08-07 DIAGNOSIS — N18.30 STAGE 3 CHRONIC KIDNEY DISEASE, UNSPECIFIED WHETHER STAGE 3A OR 3B CKD (H): ICD-10-CM

## 2025-08-07 DIAGNOSIS — Q61.2 ADPKD (AUTOSOMAL DOMINANT POLYCYSTIC KIDNEY DISEASE): Primary | ICD-10-CM

## 2025-08-15 PROCEDURE — 82306 VITAMIN D 25 HYDROXY: CPT | Performed by: INTERNAL MEDICINE

## 2025-08-15 PROCEDURE — 86160 COMPLEMENT ANTIGEN: CPT | Performed by: INTERNAL MEDICINE

## 2025-08-15 PROCEDURE — 82043 UR ALBUMIN QUANTITATIVE: CPT | Performed by: INTERNAL MEDICINE

## 2025-08-15 PROCEDURE — 99000 SPECIMEN HANDLING OFFICE-LAB: CPT | Performed by: PATHOLOGY

## 2025-08-15 PROCEDURE — 86481 TB AG RESPONSE T-CELL SUSP: CPT | Performed by: INTERNAL MEDICINE

## (undated) DEVICE — SOL NACL 0.9% IRRIG 500ML BOTTLE 2F7123

## (undated) DEVICE — SU STRATAFIX MONOCRYL 3-0 SPIRAL PS-2 30CM SXMP1B106

## (undated) DEVICE — PREP CHLORAPREP 26ML TINTED ORANGE  260815

## (undated) DEVICE — SUPPORTER ATHLETIC LG LATEX 202636

## (undated) DEVICE — CUSTOM PACK TOTAL KNEE SOP5BTKHEC

## (undated) DEVICE — DRSG AQUACEL AG HYDROFIBER  3.5X10" 422605

## (undated) DEVICE — GOWN IMPERVIOUS BREATHABLE SMART LG 89015

## (undated) DEVICE — VIAL DECANTER STERILE WHITE DYNJDEC06

## (undated) DEVICE — DRAPE LAP W/ARMBOARD 29410

## (undated) DEVICE — SUCTION MANIFOLD NEPTUNE 2 SYS 4 PORT 0702-020-000

## (undated) DEVICE — HOLDER LIMB VELCRO OR 0814-1533

## (undated) DEVICE — DRSG STERI STRIP 1/2X4" R1547

## (undated) DEVICE — GLOVE BIOGEL PI SZ 7.0 40870

## (undated) DEVICE — SU MONOCRYL 4-0 PS-2 27" UND Y426H

## (undated) DEVICE — SOL WATER IRRIG 1000ML BOTTLE 2F7114

## (undated) DEVICE — GLOVE UNDER INDICATOR PI SZ 7.0 LF 41670

## (undated) DEVICE — GLOVE UNDER INDICATOR PI SZ 6.5 LF 41665

## (undated) DEVICE — SU VICRYL 0 CT-1 27" UND J260H

## (undated) DEVICE — SU ETHIBOND 1 CT-1 30" X425H

## (undated) DEVICE — ESU GROUND PAD ADULT W/CORD E7507

## (undated) DEVICE — DRSG STERI STRIP 1X5" R1548

## (undated) DEVICE — SU VICRYL 2-0 CT-2 27" UND J269H

## (undated) DEVICE — SOL NACL 0.9% INJ 1000ML BAG 2B1324X

## (undated) DEVICE — BLADE KNIFE SURG 10 371110

## (undated) DEVICE — BLADE SAW SAGITTAL STRK WIDE 25.4X85X1.2MM 2108-151-000

## (undated) DEVICE — ELECTRODE PATIENT RETURN ADULT L10 FT 2 PLATE CORD 0855C

## (undated) DEVICE — SUTURE VICRYL+ 2-0 27IN CT-1 UND VCP259H

## (undated) DEVICE — HOOD SURG T7 PLUS STRL LF DISP 0416-801-200

## (undated) DEVICE — DRSG GAUZE 4X4" 3033

## (undated) DEVICE — PACK MINOR CUSTOM ASC

## (undated) DEVICE — CUSTOM PACK TOTAL KNEE ACCESSORY SOP5BTAHEA

## (undated) DEVICE — GLOVE PI ULTRATCH M LF SZ 6.5 PF CUFF TEXT STRL LF 42665

## (undated) DEVICE — SU STRATAFIX PDS PLUS 2 CTX SPIRAL L14 IN SXPP2B405

## (undated) DEVICE — GLOVE BIOGEL PI MICRO SZ 7.5 48575

## (undated) DEVICE — DRAIN PENROSE 0.25"X18" LATEX FREE GR201

## (undated) DEVICE — LINEN ORTHO PACK 5446

## (undated) DEVICE — KIT ATTUNE EPAK PIN SYSTEM 2544-00-111

## (undated) DEVICE — SOL NACL 0.9% IRRIG 1000ML BOTTLE 2F7124

## (undated) DEVICE — SYR 10ML FINGER CONTROL W/O NDL 309695

## (undated) DEVICE — CAST PADDING 6IN COTTON WEBRIL STERILE 2554

## (undated) RX ORDER — BUPIVACAINE HYDROCHLORIDE 2.5 MG/ML
INJECTION, SOLUTION EPIDURAL; INFILTRATION; INTRACAUDAL
Status: DISPENSED
Start: 2024-02-26

## (undated) RX ORDER — DEXAMETHASONE SODIUM PHOSPHATE 4 MG/ML
INJECTION, SOLUTION INTRA-ARTICULAR; INTRALESIONAL; INTRAMUSCULAR; INTRAVENOUS; SOFT TISSUE
Status: DISPENSED
Start: 2024-02-26

## (undated) RX ORDER — PROPOFOL 10 MG/ML
INJECTION, EMULSION INTRAVENOUS
Status: DISPENSED
Start: 2024-02-26

## (undated) RX ORDER — CEFAZOLIN SODIUM 2 G/50ML
SOLUTION INTRAVENOUS
Status: DISPENSED
Start: 2024-02-26

## (undated) RX ORDER — PROPOFOL 10 MG/ML
INJECTION, EMULSION INTRAVENOUS
Status: DISPENSED
Start: 2024-12-31

## (undated) RX ORDER — ACETAMINOPHEN 325 MG/1
TABLET ORAL
Status: DISPENSED
Start: 2024-02-26

## (undated) RX ORDER — OXYCODONE HYDROCHLORIDE 5 MG/1
TABLET ORAL
Status: DISPENSED
Start: 2024-02-26

## (undated) RX ORDER — FENTANYL CITRATE 50 UG/ML
INJECTION, SOLUTION INTRAMUSCULAR; INTRAVENOUS
Status: DISPENSED
Start: 2024-02-26

## (undated) RX ORDER — ONDANSETRON 2 MG/ML
INJECTION INTRAMUSCULAR; INTRAVENOUS
Status: DISPENSED
Start: 2024-02-26

## (undated) RX ORDER — GLYCOPYRROLATE 0.2 MG/ML
INJECTION INTRAMUSCULAR; INTRAVENOUS
Status: DISPENSED
Start: 2024-02-26

## (undated) RX ORDER — GLYCOPYRROLATE 0.2 MG/ML
INJECTION, SOLUTION INTRAMUSCULAR; INTRAVENOUS
Status: DISPENSED
Start: 2024-12-31